# Patient Record
Sex: FEMALE | Race: BLACK OR AFRICAN AMERICAN | HISPANIC OR LATINO | Employment: PART TIME | URBAN - METROPOLITAN AREA
[De-identification: names, ages, dates, MRNs, and addresses within clinical notes are randomized per-mention and may not be internally consistent; named-entity substitution may affect disease eponyms.]

---
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
## 2021-02-19 ENCOUNTER — TELEPHONE (OUTPATIENT)
Dept: PSYCHIATRY | Facility: CLINIC | Age: 21
End: 2021-02-19

## 2021-02-19 NOTE — TELEPHONE ENCOUNTER
Behavorial Health Outpatient Intake Questions    Referred by: PCP    Please advised interviewee that they need to answer all questions truthfully to allow for best care and any misrepresentations of information may affect their ability to be seen at this clinic   => Was this discussed? Yes     [facility]     Presenting Problem (in patient's words): Anxiety, depression      Are there any developmental disabilities? ? If yes, can they speak to you on the phone? If they are too limited to speak to you on phone, refer out No    Are you taking any psychiatric medications? No    => If yes, who prescribes? If yes, are they injectable medications? Does the patient have a language barrier or hearing impairment? No    Have you been treated at [facility] by a therapist or a doctor in the past? If yes, who? No    Has the patient been hospitalized for mental health? Yes,    If yes, how long ago was last hospitalization and where was it? [facility] 2021    Do you actively use alcohol or marijuana or illegal substances? If yes, what and how much - refer out to Drug and alcohol treatment if use is excessive or daily use of illegal substances No concerns of substance abuse are reported  Do you have a community treatment team or ? No    Legal History-     Does the patient have any history of arrests, longterm/skilled nursing time, or DUIs? No  If Yes-  1) What types of charges? 2) When were they last incarcerated? 3) Are they currently on parole or probation? Minor Child-    Who has custody of the child? Is there a custody agreement? If there is a custody agreement remind parent that they must bring a copy to the first appt or they will not be seen       Intake Team, please check with provider before scheduling if flags come up such as:  - complex case  - legal history (other than DUI)  - communication barrier concerns are present  - if, in your judgment, this needs further review    ACCEPTED as a patient : Yes => Appointment Date: 01/14/2022 at 8:00 a m with Rei Maximas     Referred Elsewhere? No    Name of Insurance Co: [employer] \[facility]\""   Insurance ID# [id number]   Insurance Phone #  If ins is primary or secondary  If patient is a minor, parents information such as Name, D  O B of guarantor

## 2021-02-22 DIAGNOSIS — L20.82 FLEXURAL ECZEMA: ICD-10-CM

## 2021-02-22 RX ORDER — TRIAMCINOLONE ACETONIDE 1 MG/G
CREAM TOPICAL 2 TIMES DAILY PRN
Qty: 30 G | Refills: 0 | Status: CANCELLED | OUTPATIENT
Start: 2021-02-22 | End: 2021-03-01

## 2021-02-22 NOTE — TELEPHONE ENCOUNTER
Appt schedule 03/03/2021  Patient wants to know if the original dosage can be sent in the meantime ? ?

## 2021-02-22 NOTE — TELEPHONE ENCOUNTER
If she feels she needs a stronger dose due to increase in symptoms then she will need an appt to review with the provider   Please schedule Post-Care Instructions: I reviewed with the patient in detail post-care instructions. Patient is to keep the biopsy site dry overnight, and then apply bacitracin twice daily until healed. Patient may apply hydrogen peroxide soaks to remove any crusting. Anesthesia Type: 1% lidocaine with epinephrine Depth Of Biopsy: dermis Render In Bullet Format When Appropriate: No Size Of Lesion In Cm: 0 Hemostasis: Angelica's Billing Type: Third-Party Bill Notification Instructions: Patient will be notified of biopsy results. However, patient instructed to call the office if not contacted within 2 weeks. Curettage Text: The wound bed was treated with curettage after the biopsy was performed. Biopsy Type: H and E Silver Nitrate Text: The wound bed was treated with silver nitrate after the biopsy was performed. Anesthesia Volume In Cc: 1 Detail Level: Detailed Cryotherapy Text: The wound bed was treated with cryotherapy after the biopsy was performed. Was A Bandage Applied: Yes Consent: Written consent was obtained and risks were reviewed including but not limited to scarring, infection, bleeding, scabbing, incomplete removal, nerve damage and allergy to anesthesia. Wound Care: Vaseline Dressing: bandage Electrodesiccation And Curettage Text: The wound bed was treated with electrodesiccation and curettage after the biopsy was performed. Type Of Destruction Used: Curettage Biopsy Method: Personna blade Electrodesiccation Text: The wound bed was treated with electrodesiccation after the biopsy was performed.

## 2021-02-22 NOTE — TELEPHONE ENCOUNTER
Name of medication, dose, quantity and frequency    Requested Prescriptions     Pending Prescriptions Disp Refills    triamcinolone (KENALOG) 0 1 % cream 30 g 0     Sig: Apply topically 2 (two) times a day as needed (eczema flare) for up to 7 days     Number of refills left:  0    Amount of medication left:  0    Pharmacy verified and updated   yes    Additional information:    Patient is requesting a stronger dosage  Patient states she is breaking out everywhere    Please check into this

## 2021-02-25 DIAGNOSIS — L20.82 FLEXURAL ECZEMA: ICD-10-CM

## 2021-02-25 RX ORDER — TRIAMCINOLONE ACETONIDE 1 MG/G
CREAM TOPICAL 2 TIMES DAILY PRN
Qty: 30 G | Refills: 0 | Status: SHIPPED | OUTPATIENT
Start: 2021-02-25 | End: 2021-04-14

## 2021-03-31 ENCOUNTER — PATIENT OUTREACH (OUTPATIENT)
Dept: INTERNAL MEDICINE CLINIC | Facility: CLINIC | Age: 21
End: 2021-03-31

## 2021-03-31 NOTE — PROGRESS NOTES
No further contact from pt but she is on the waiting list for therapist at [facility]  SW to remain available to assist as indicted  Please re-consult SW as needed

## 2021-04-14 ENCOUNTER — OFFICE VISIT (OUTPATIENT)
Dept: OBGYN CLINIC | Facility: CLINIC | Age: 21
End: 2021-04-14

## 2021-04-14 VITALS
SYSTOLIC BLOOD PRESSURE: 109 MMHG | BODY MASS INDEX: 30.96 KG/M2 | DIASTOLIC BLOOD PRESSURE: 73 MMHG | HEIGHT: 61 IN | WEIGHT: 164 LBS | HEART RATE: 66 BPM

## 2021-04-14 DIAGNOSIS — R35.0 URINE FREQUENCY: Primary | ICD-10-CM

## 2021-04-14 DIAGNOSIS — B37.3 VAGINAL YEAST INFECTION: ICD-10-CM

## 2021-04-14 DIAGNOSIS — N89.8 VAGINAL DISCHARGE: ICD-10-CM

## 2021-04-14 LAB
BV WHIFF TEST VAG QL: ABNORMAL
CLUE CELLS SPEC QL WET PREP: ABNORMAL
PH SMN: 4.5 [PH]
SL AMB  POCT GLUCOSE, UA: NORMAL
SL AMB LEUKOCYTE ESTERASE,UA: NORMAL
SL AMB POCT BILIRUBIN,UA: NORMAL
SL AMB POCT BLOOD,UA: NORMAL
SL AMB POCT CLARITY,UA: NORMAL
SL AMB POCT COLOR,UA: YELLOW
SL AMB POCT KETONES,UA: NORMAL
SL AMB POCT NITRITE,UA: NORMAL
SL AMB POCT PH,UA: NORMAL
SL AMB POCT SPECIFIC GRAVITY,UA: 1.01
SL AMB POCT URINE PROTEIN: NORMAL
SL AMB POCT UROBILINOGEN: 0.2
SL AMB POCT WET MOUNT: ABNORMAL
T VAGINALIS VAG QL WET PREP: ABNORMAL
YEAST VAG QL WET PREP: ABNORMAL

## 2021-04-14 PROCEDURE — 81002 URINALYSIS NONAUTO W/O SCOPE: CPT | Performed by: NURSE PRACTITIONER

## 2021-04-14 PROCEDURE — 1036F TOBACCO NON-USER: CPT | Performed by: NURSE PRACTITIONER

## 2021-04-14 PROCEDURE — 87210 SMEAR WET MOUNT SALINE/INK: CPT | Performed by: NURSE PRACTITIONER

## 2021-04-14 PROCEDURE — 87591 N.GONORRHOEAE DNA AMP PROB: CPT | Performed by: NURSE PRACTITIONER

## 2021-04-14 PROCEDURE — 87086 URINE CULTURE/COLONY COUNT: CPT | Performed by: NURSE PRACTITIONER

## 2021-04-14 PROCEDURE — 87491 CHLMYD TRACH DNA AMP PROBE: CPT | Performed by: NURSE PRACTITIONER

## 2021-04-14 PROCEDURE — 3008F BODY MASS INDEX DOCD: CPT | Performed by: NURSE PRACTITIONER

## 2021-04-14 PROCEDURE — 99213 OFFICE O/P EST LOW 20 MIN: CPT | Performed by: NURSE PRACTITIONER

## 2021-04-14 RX ORDER — FLUCONAZOLE 150 MG/1
150 TABLET ORAL ONCE
Qty: 1 TABLET | Refills: 0 | Status: SHIPPED | OUTPATIENT
Start: 2021-04-14 | End: 2021-04-14

## 2021-04-14 NOTE — PATIENT INSTRUCTIONS
Yeast Infection   WHAT YOU NEED TO KNOW:   What is a yeast infection? A yeast infection, or vulvovaginal candidiasis, is a common vaginal infection  Vulvovaginal candidiasis is caused by a fungus, or yeast-like germ  Fungi are normally found in your vagina  Too many fungi can cause an infection  What increases my risk for a yeast infection? · Pregnancy    · Medicines, such as antibiotics, birth control pills, or steroid medicine    · Medical conditions, such as diabetes    · Contraceptive devices, such as diaphragms, sponges, and intrauterine devices    What are the signs and symptoms of a yeast infection? · Thick, white, cheese-like discharge from your vagina    · Itching, swelling, and redness in your vagina    · Burning when you urinate    How is a yeast infection diagnosed and treated? · Your healthcare provider will ask about your medical history and examine you  A sample of your vaginal discharge may show what germ is causing your infection  · Medicines help treat the fungal infection and decrease inflammation  The medicine may be a pill, cream, ointment, or vaginal tablet or suppository  With treatment, the infection is usually gone within a week  Keep your vagina healthy:   · Do not have sex until your symptoms go away  Have your partner wear a condom until you complete your course of medication  · Always wipe from front to back  after you use the toilet  This prevents spreading bacteria from your rectal area into your vagina  · Clean around your vulva with mild soap and warm water each day  Gently dry the area after washing  Do not use hot tubs  The heat and moisture from hot tubs can increase your risk for another yeast infection  · Do not wear tight-fitting clothes or undergarments  for long periods  Wear cotton underwear during the day  Cotton helps keep your genital area dry and does not hold in warmth or moisture  Do not wear underwear at night      · Change your laundry soap or fabric softener  if you think it is irritating your skin  · Do not douche  or use feminine hygiene sprays or bubble bath  Do not use pads or tampons that are scented, or colored or perfumed toilet paper  · Ask your healthcare provider about birth control options if necessary  Condoms have latex and diaphragms have gel that kills sperm  Both of these may irritate your genital area  When should I contact my healthcare provider? · You have fever and chills  · You develop abdominal or pelvic pain  · Your discharge is bloody and it is not your monthly period  · Your signs and symptoms get worse, even after treatment  · You have questions or concerns about your condition or care  CARE AGREEMENT:   You have the right to help plan your care  Learn about your health condition and how it may be treated  Discuss treatment options with your healthcare providers to decide what care you want to receive  You always have the right to refuse treatment  The above information is an  only  It is not intended as medical advice for individual conditions or treatments  Talk to your doctor, nurse or pharmacist before following any medical regimen to see if it is safe and effective for you  © Copyright [address] [place] [address] Drive Information is for End User's use only and may not be sold, redistributed or otherwise used for commercial purposes  All illustrations and images included in CareNotes® are the copyrighted property of [employer]  or [address]   Fluconazole (By mouth)   Fluconazole ([id number])  Prevents and treats fungal infections  Brand Name(s): Diflucan   There may be other brand names for this medicine  When This Medicine Should Not Be Used: This medicine is not right for everyone  Do not use it if you had an allergic reaction to fluconazole, or if you are pregnant  How to Use This Medicine:   Liquid, Tablet  · Your doctor will tell you how much medicine to use  Do not use more than directed  · Oral liquid: Shake well just before each use  Measure the oral liquid medicine with a marked measuring spoon, oral syringe, or medicine cup  · Take all of the medicine in your prescription to clear up your infection, even if you feel better after the first few doses  · Read and follow the patient instructions that come with this medicine  Talk to your doctor or pharmacist if you have any questions  · Missed dose: Take a dose as soon as you remember  If it is almost time for your next dose, wait until then and take a regular dose  Do not take extra medicine to make up for a missed dose  · Store the medicine in a closed container at room temperature, away from heat, moisture, and direct light  You may store the oral liquid in the refrigerator or at room temperature for up to 14 days  Do not freeze  Drugs and Foods to Avoid:   Ask your doctor or pharmacist before using any other medicine, including over-the-counter medicines, vitamins, and herbal products  · Do not use this medicine together with astemizole, cisapride, erythromycin, pimozide, quinidine, or terfenadine  · Some medicines can affect how fluconazole works  Tell your doctor of all medicines you are taking, including any of the following:   ? Amiodarone, amphotericin B, astemizole, cimetidine, cyclosporine, halofantrine, midazolam, prednisone, rifabutin, rifampin, sirolimus, tacrolimus, theophylline, tofacitinib, triazolam, vitamin A supplements, voriconazole  ? Birth control pills  ? Blood pressure medicine (including amlodipine, felodipine, isradipine, losartan, nifedipine)  ? Blood thinner (including warfarin)  ? Cancer medicine (including cyclophosphamide, olaparib, vinblastine, vincristine)  ? Diuretic (water pill, including hydrochlorothiazide)  ? Medicine to lower cholesterol (including atorvastatin, fluvastatin, simvastatin)  ? Medicine to treat depression (including amitriptyline, nortriptyline)  ?  Medicine to treat HIV/AIDS (including saquinavir, zidovudine)  ? Medicine to treat seizures (including carbamazepine, phenytoin)  ? Narcotic pain medicine (including alfentanil, fentanyl, methadone)  ? NSAID pain or arthritis medicine (including aspirin, celecoxib, diclofenac, ibuprofen, naproxen)  ? Oral diabetes medicine (including glipizide, glyburide, tolbutamide)  · Check with your doctor before starting any new medicines within 7 days of using this medicine  Warnings While Using This Medicine:   · It is not safe to take this medicine during pregnancy  It could harm an unborn baby  Tell your doctor right away if you become pregnant  Use an effective form of birth control during treatment with this medicine and for at least 1 week after the last dose  · Tell your doctor if you are breastfeeding, or if you have kidney disease, liver disease, heart disease, heart failure, heart rhythm problems, cancer, HIV/AIDS, or hereditary problems  · This medicine may cause the following problems:   ? Liver problems  ? Serious skin reactions  ? Changes in heart rhythm, including QT prolongation  ? Adrenal gland problems  · This medicine may make you dizzy or drowsy  Do not drive or do anything else that could be dangerous until you know how this medicine affects you  · Call your doctor if your symptoms do not improve or if they get worse  · Your doctor will do lab tests at regular visits to check on the effects of this medicine  Keep all appointments  · Keep all medicine out of the reach of children  Never share your medicine with anyone    Possible Side Effects While Using This Medicine:   Call your doctor right away if you notice any of these side effects:  · Allergic reaction: Itching or hives, swelling in your face or hands, swelling or tingling in your mouth or throat, chest tightness, trouble breathing  · Blistering, peeling, or red skin rash  · Changes in skin color, dark freckles, cold feeling, weight loss  · Dark urine or pale stools, nausea, vomiting, loss of appetite, stomach pain, yellow skin or eyes  · Fainting, dizziness, lightheadedness  · Fast, pounding, or uneven heartbeat  · Unusual bleeding, bruising, or weakness  If you notice these less serious side effects, talk with your doctor:   · Headache  · Mild nausea, vomiting, diarrhea  If you notice other side effects that you think are caused by this medicine, tell your doctor  Call your doctor for medical advice about side effects  You may report side effects to FDA at 4-235-FDA-4193  © Copyright [address] [place] [address] Drive Information is for End User's use only and may not be sold, redistributed or otherwise used for commercial purposes  The above information is an  only  It is not intended as medical advice for individual conditions or treatments  Talk to your doctor, nurse or pharmacist before following any medical regimen to see if it is safe and effective for you

## 2021-04-14 NOTE — PROGRESS NOTES
Assessment/Plan:      Diagnoses and all orders for this visit:    Urine frequency  -     POCT urine dip  -     POCT wet mount  -     Chlamydia/GC amplified DNA by PCR  -     Urine culture    Vaginal discharge  -     POCT wet mount  -     Chlamydia/GC amplified DNA by PCR    Vaginal yeast infection  -     fluconazole (DIFLUCAN) 150 mg tablet; Take 1 tablet (150 mg total) by mouth once for 1 dose      -reviewed with patient diagnosis of vaginal yeast infection and treatment fluconazole  Written information provided  -reviewed with patient urine dip is negative for infection in office today  Will send for culture and treat based on results  Patient encouraged to increase hydration  -chlamydia and gonorrhea sent  Will treat based on results  -safe sexual practices reviewed and encouraged  Reviewed patient Rubens Samina will protect from pregnancy will not protect from sexually transmitted infections  -signs and symptoms report reviewed      RTO one year for annual exam    Subjective:     Patient ID: Marianna Montes is a 22 y o  female  HPI G0 here with complaints urinary frequency for approximately 1 week  Denies urgency and pain with urination  Associated symptoms include white thick vaginal discharge intermittent for approximately 3-4 weeks  Denies alleviating or aggravating factors  Has not tried any over-the-counter remedies  Is sexually active has Nexplanon for contraception  Denies new partners, fever, chills, pain in bowel concerns    Had Gardasil vaccine series  Pap smears to begin age 25  Last gonorrhea/chlamydia 5/19/20 negative    Review of Systems   Constitutional: Negative for chills, fatigue and fever  Respiratory: Negative  Cardiovascular: Negative  Genitourinary: Positive for decreased urine volume, frequency and vaginal discharge  Negative for difficulty urinating, dysuria, flank pain, genital sores, hematuria, menstrual problem, pelvic pain, urgency, vaginal bleeding and vaginal pain  Objective:  Vitals:    04/14/21 0940   BP: 109/73   Pulse: 66          Physical Exam  Vitals signs reviewed  Exam conducted with a chaperone present  Constitutional:       Appearance: Normal appearance  Cardiovascular:      Rate and Rhythm: Normal rate and regular rhythm  Genitourinary:     General: Normal vulva  Vagina: Vaginal discharge present  Cervix: Normal       Uterus: Normal        Comments: Large amount of thick white vaginal discharge noted on exam adherent to vaginal wall  Neurological:      General: No focal deficit present  Mental Status: She is alert     Psychiatric:         Mood and Affect: Mood normal          Behavior: Behavior normal

## 2021-04-15 LAB — BACTERIA UR CULT: NORMAL

## 2021-04-16 LAB
C TRACH DNA SPEC QL NAA+PROBE: NEGATIVE
N GONORRHOEA DNA SPEC QL NAA+PROBE: NEGATIVE

## 2021-06-15 ENCOUNTER — OFFICE VISIT (OUTPATIENT)
Dept: OBGYN CLINIC | Facility: CLINIC | Age: 21
End: 2021-06-15

## 2021-06-15 VITALS
SYSTOLIC BLOOD PRESSURE: 122 MMHG | WEIGHT: 166 LBS | BODY MASS INDEX: 31.34 KG/M2 | HEIGHT: 61 IN | DIASTOLIC BLOOD PRESSURE: 74 MMHG | HEART RATE: 84 BPM

## 2021-06-15 DIAGNOSIS — B37.3 YEAST INFECTION OF THE VAGINA: Primary | ICD-10-CM

## 2021-06-15 PROCEDURE — 1036F TOBACCO NON-USER: CPT | Performed by: OBSTETRICS & GYNECOLOGY

## 2021-06-15 PROCEDURE — 3008F BODY MASS INDEX DOCD: CPT | Performed by: OBSTETRICS & GYNECOLOGY

## 2021-06-15 PROCEDURE — 99213 OFFICE O/P EST LOW 20 MIN: CPT | Performed by: OBSTETRICS & GYNECOLOGY

## 2021-06-15 RX ORDER — FLUCONAZOLE 150 MG/1
150 TABLET ORAL ONCE
Qty: 1 TABLET | Refills: 0 | Status: SHIPPED | OUTPATIENT
Start: 2021-06-15 | End: 2021-06-15

## 2021-06-16 NOTE — PROGRESS NOTES
2906  VISIT  Name: Joanie Marie  MRN: [id number]  : 2000      ASSESSMENT/PLAN:  Problem List Items Addressed This Visit     None      Visit Diagnoses     Yeast infection of the vagina    -  Primary    Relevant Medications    fluconazole (DIFLUCAN) 150 mg tablet            SUBJECTIVE:  20yo G0 presenting w/ vaginal discharge and itchiness  Has had similar symptoms in the past when she had a yeast infection  Denies urinary symptoms  Sexually active, Nexplanon for contraception -> irregular periods  Denies new partners        OBJECTIVE:  Vitals:    06/15/21 1555   BP: 122/74   Pulse: 84       Physical Exam    Thick white discharge on exam  +hyphae on KOH/wet mount      Cedrick Cifuentes MD  OB/GYN PGY-3  6/15/2021

## 2021-07-24 ENCOUNTER — HOSPITAL ENCOUNTER (EMERGENCY)
Facility: HOSPITAL | Age: 21
Discharge: HOME | End: 2021-07-24
Attending: EMERGENCY MEDICINE
Payer: COMMERCIAL

## 2021-07-24 VITALS
SYSTOLIC BLOOD PRESSURE: 127 MMHG | OXYGEN SATURATION: 98 % | HEART RATE: 62 BPM | RESPIRATION RATE: 18 BRPM | BODY MASS INDEX: 31.18 KG/M2 | TEMPERATURE: 98.1 F | DIASTOLIC BLOOD PRESSURE: 87 MMHG | WEIGHT: 165 LBS

## 2021-07-24 DIAGNOSIS — M54.50 ACUTE LOW BACK PAIN: Primary | ICD-10-CM

## 2021-07-24 DIAGNOSIS — R35.89 POLYURIA: ICD-10-CM

## 2021-07-24 DIAGNOSIS — R20.2 PARESTHESIAS: ICD-10-CM

## 2021-07-24 LAB
BILIRUB UR QL STRIP: NEGATIVE
BILIRUB UR QL STRIP: NEGATIVE
CLARITY UR: CLEAR
CLARITY UR: CLEAR
COLOR UR: YELLOW
COLOR UR: YELLOW
EXT PREG TEST URINE: NORMAL
EXT. CONTROL ED NAV: NORMAL
GLUCOSE UR STRIP-MCNC: NEGATIVE MG/DL
GLUCOSE UR STRIP-MCNC: NEGATIVE MG/DL
HGB UR QL STRIP.AUTO: NEGATIVE
HGB UR QL STRIP.AUTO: NEGATIVE
KETONES UR STRIP-MCNC: NEGATIVE MG/DL
KETONES UR STRIP-MCNC: NEGATIVE MG/DL
LEUKOCYTE ESTERASE UR QL STRIP: NEGATIVE
LEUKOCYTE ESTERASE UR QL STRIP: NEGATIVE
NITRITE UR QL STRIP: NEGATIVE
NITRITE UR QL STRIP: NEGATIVE
PH UR STRIP.AUTO: 7 [PH]
PH UR STRIP.AUTO: 7 [PH] (ref 4.5–8)
PROT UR STRIP-MCNC: NEGATIVE MG/DL
PROT UR STRIP-MCNC: NEGATIVE MG/DL
SP GR UR STRIP.AUTO: 1.02 (ref 1–1.03)
SP GR UR STRIP.AUTO: 1.02 (ref 1–1.03)
UROBILINOGEN UR QL STRIP.AUTO: 0.2 E.U./DL
UROBILINOGEN UR QL STRIP.AUTO: 1 E.U./DL

## 2021-07-24 PROCEDURE — 99284 EMERGENCY DEPT VISIT MOD MDM: CPT | Performed by: EMERGENCY MEDICINE

## 2021-07-24 PROCEDURE — 99283 EMERGENCY DEPT VISIT LOW MDM: CPT

## 2021-07-24 PROCEDURE — 81003 URINALYSIS AUTO W/O SCOPE: CPT

## 2021-07-24 PROCEDURE — 81025 URINE PREGNANCY TEST: CPT | Performed by: EMERGENCY MEDICINE

## 2021-07-24 PROCEDURE — 81003 URINALYSIS AUTO W/O SCOPE: CPT | Performed by: EMERGENCY MEDICINE

## 2021-07-24 RX ORDER — IBUPROFEN 400 MG/1
400 TABLET ORAL ONCE
Status: COMPLETED | OUTPATIENT
Start: 2021-07-24 | End: 2021-07-24

## 2021-07-24 RX ORDER — ACETAMINOPHEN 325 MG/1
650 TABLET ORAL ONCE
Status: COMPLETED | OUTPATIENT
Start: 2021-07-24 | End: 2021-07-24

## 2021-07-24 RX ADMIN — IBUPROFEN 400 MG: 400 TABLET ORAL at 12:49

## 2021-07-24 RX ADMIN — ACETAMINOPHEN 650 MG: 325 TABLET, FILM COATED ORAL at 12:50

## 2021-07-24 NOTE — ED ATTENDING ATTESTATION
7/24/2021  I, Saintclair Bride, MD, saw and evaluated the patient  I have discussed the patient with the resident/non-physician practitioner and agree with the resident's/non-physician practitioner's findings, Plan of Care, and MDM as documented in the resident's/non-physician practitioner's note, except where noted  All available labs and Radiology studies were reviewed  I was present for key portions of any procedure(s) performed by the resident/non-physician practitioner and I was immediately available to provide assistance  At this point I agree with the current assessment done in the Emergency Department  I have conducted an independent evaluation of this patient a history and physical is as follows: This is a 22 y o  old female who presents to the ED for evaluation of back pain  Bilateral lower back pain, radiates down the left to the thigh, started after working multiple shifts at work  No urinary symptoms  No trauma, no red flag symptoms  VS and nursing notes reviewed  General: Appears in NAD, awake, alert, speaking normally in full sentences  Well-nourished, well-developed  Appears stated age  Head: Normocephalic, atraumatic  Eyes: EOMI  Vision grossly normal  No subconjunctival hemorrhages or occular discharge noted  Symmetrical lids  ENT: Atraumatic external nose and ears  No stridor  Normal phonation  No drooling  Normal swallowing  Neck: No JVD  FROM  No goiter  CV: No pallor  Normal rate  Lungs: No tachypnea  No respiratory distress  MSK: Moving all extremities equally, no peripheral edema  Skin: Dry, intact  No cyanosis  Neuro: Awake, alert, GCS15  CN II-XII grossly intact  Grossly normal gait  Psychiatric/Behavioral: Appropriate mood and affect  A/P: This is a 22 y o  female who presents to the ED for evaluation of back pain  NSAIDs, can follow up with comp spine        ED Course         Critical Care Time  Procedures

## 2021-07-24 NOTE — ED PROVIDER NOTES
History  Chief Complaint   Patient presents with    Back Pain     pt presents ambulatory with c/o L side lower back/flank pain with shooting pain down L leg  possible injury  denies hematuria/dysuria      Patient is a 73-year-old female, with history significant for 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and severe menstrual cramps, who presents the ED today due to atraumatic acute lower back pain since last weekend  The onset of her symptoms was after working three 12 hour shifts at [employer] where she was on her feet all day  In the following days, patient developed radiation of this discomfort to midway down the posterior aspect of her left thigh  There was one episode of paresthesias in this distribution  The pain is mild in intensity and pressure-like in character  It is felt in the lower back bilaterally but it is more prominent on the left side  Position change exacerbates her discomfort  Patient has not taken any analgesia; however, she has attempted stretching and this partially remitted her symptoms  The pain has been constant since onset  Patient denies associated fever, trauma, IVDU, steroids, incontinence, urinary retention, saddle anesthesia  Patient denies dysuria but does report some polyuria/increased frequency  - No language barrier    - History obtained from patient  - There are no limitations to the history obtained  - Previous charting was reviewed          Prior to Admission Medications   Prescriptions Last Dose Informant Patient Reported? Taking?    LORazepam (ATIVAN) 0 5 mg tablet   No No   Sig: Take 1 tablet (0 5 mg total) by mouth every 8 (eight) hours as needed for anxiety   diazepam (VALIUM) 5 mg tablet   No No   Sig: Take 1 tablet (5 mg total) by mouth 3 (three) times a day for 2 days   Patient not taking: Reported on 8/14/2020   etonogestrel (NEXPLANON) subdermal implant   Yes No   Sig: Inject 68 mg under the skin   loratadine (CLARITIN) 10 mg tablet   No No   Sig: Take 1 tablet (10 mg Hematological: Negative for adenopathy  Psychiatric/Behavioral: Negative for confusion  Physical Exam  ED Triage Vitals [07/24/21 1134]   Temperature Pulse Respirations Blood Pressure SpO2   98 1 °F (36 7 °C) 62 18 127/87 98 %      Temp Source Heart Rate Source Patient Position - Orthostatic VS BP Location FiO2 (%)   Tympanic Monitor -- -- --      Pain Score       5             Orthostatic Vital Signs  Vitals:    07/24/21 1134   BP: 127/87   Pulse: 62       Physical Exam  Vitals and nursing note reviewed  Constitutional:       General: She is not in acute distress  Appearance: She is obese  She is not ill-appearing or toxic-appearing  HENT:      Head: Normocephalic  Right Ear: External ear normal       Left Ear: External ear normal       Nose: Nose normal  No rhinorrhea  Mouth/Throat:      Mouth: Mucous membranes are moist       Pharynx: Oropharynx is clear  No oropharyngeal exudate or posterior oropharyngeal erythema  Eyes:      General: No scleral icterus  Right eye: No discharge  Left eye: No discharge  Conjunctiva/sclera: Conjunctivae normal       Pupils: Pupils are equal, round, and reactive to light  Cardiovascular:      Rate and Rhythm: Normal rate and regular rhythm  Pulses: Normal pulses  Heart sounds: Normal heart sounds  No murmur heard  No friction rub  No gallop  Comments: 2+ Radial  Pulmonary:      Effort: Pulmonary effort is normal  No respiratory distress  Breath sounds: Normal breath sounds  No stridor  No wheezing, rhonchi or rales  Abdominal:      General: Abdomen is flat  Bowel sounds are normal  There is no distension  Palpations: Abdomen is soft  Tenderness: There is no abdominal tenderness  There is no right CVA tenderness, left CVA tenderness, guarding or rebound  Musculoskeletal:         General: Tenderness present  Cervical back: Neck supple  No tenderness  No muscular tenderness        Right lower leg: No edema  Left lower leg: No edema  Comments: No midline C, T, L-spine tenderness to palpation  Sacral paraspinal tenderness more pronounced on the left but present bilaterally  No SI joint tenderness bilaterally  Negative straight leg raise bilateral     Lymphadenopathy:      Cervical: No cervical adenopathy  Skin:     General: Skin is warm and dry  Capillary Refill: Capillary refill takes less than 2 seconds  Neurological:      Mental Status: She is alert  Comments: Patient is speaking clearly in complete sentences  Patient is answering appropriately and able follow commands  Patient is moving all four extremities spontaneously  No facial droop  Tongue midline  Intact L5 and S1 motor and sensory function  No saddle anesthesia  Patient is able to ambulate without difficulty  5/5 lower extremity strength bilaterally      Psychiatric:         Mood and Affect: Mood normal          Behavior: Behavior normal          ED Medications  Medications   ibuprofen (MOTRIN) tablet 400 mg (400 mg Oral Given 7/24/21 1249)   acetaminophen (TYLENOL) tablet 650 mg (650 mg Oral Given 7/24/21 1250)       Diagnostic Studies  Results Reviewed     Procedure Component Value Units Date/Time    UA w Reflex to Microscopic w Reflex to Culture [[id number]] Collected: 07/24/21 1253    Lab Status: Final result Specimen: Urine, Clean Catch Updated: 07/24/21 1309     Color, UA Yellow     Clarity, UA Clear     Specific Gravity, UA 1 023     pH, UA 7 0     Leukocytes, UA Negative     Nitrite, UA Negative     Protein, UA Negative mg/dl      Glucose, UA Negative mg/dl      Ketones, UA Negative mg/dl      Urobilinogen, UA 1 0 E U /dl      Bilirubin, UA Negative     Blood, UA Negative    POCT pregnancy, urine [[id number]]  (Normal) Resulted: 07/24/21 1250    Lab Status: Final result Updated: 07/24/21 1250     EXT PREG TEST UR (Ref: Negative) neg     Control valid    Urine Macroscopic, POC [[id number]] Collected: 07/24/21 1243    Lab Status: Final result Specimen: Urine Updated: 07/24/21 1245     Color, UA Yellow     Clarity, UA Clear     pH, UA 7 0     Leukocytes, UA Negative     Nitrite, UA Negative     Protein, UA Negative mg/dl      Glucose, UA Negative mg/dl      Ketones, UA Negative mg/dl      Urobilinogen, UA 0 2 E U /dl      Bilirubin, UA Negative     Blood, UA Negative     Specific Gravity, UA 1 025    Narrative:      CLINITEK RESULT                 No orders to display         Procedures  Procedures      ED Course  ED Course as of Jul 24 1324   Sat Jul 24, 2021   1324 Patient reports improvement in her symptoms  She has neither questions nor concerns after receiving discharge instructions and return precautions  MDM  Number of Diagnoses or Management Options  Acute low back pain  Paresthesias  Polyuria  Diagnosis management comments: Patient is a 20-year-old female, with history significant for 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and severe menstrual cramps, who presents the ED today due to atraumatic acute lower back pain since last weekend  The onset of her symptoms was after working three 12 hour shifts at [employer] where she was on her feet all day  In the following days, patient developed radiation of this discomfort to midway down the posterior aspect of her left thigh  Position change exacerbates her discomfort and stretching relieves  Patient denies associated fever, trauma, IVDU, steroids, incontinence, urinary retention, saddle anesthesia  Patient denies dysuria but does report some polyuria/increased frequency  Patient is currently afebrile and hemodynamically stable  Her physical exam is notable for bilateral sacral paraspinal tenderness  Negative straight leg raise bilaterally  No concerning neuro signs in the lower extremities  This presentation is concerning for:  Musculoskeletal back pain  I also considered UTI/pregnancy   Low clinical suspicion for cauda equina, conus medullaris, epidural abscess at this time based upon history and physical exam   Will investigate with UA/urine pregnancy  Will manage with Motrin/Tylenol, referral to comprehensive spine, and further based on workup      Disposition  Final diagnoses:   Acute low back pain   Polyuria   Paresthesias     Time reflects when diagnosis was documented in both MDM as applicable and the Disposition within this note     Time User Action Codes Description Comment    7/24/2021 12:29 PM Birdoksana Ovalleaw A Add [M54 5] Acute low back pain     7/24/2021 12:29 PM Birdena Outlaw A Add [R35 8] Polyuria     7/24/2021 12:30 PM Birdena Outlaw Add [R20 2] Paresthesias       ED Disposition     ED Disposition Condition Date/Time Comment    Discharge Stable Sat Jul 24, 2021  1:21 PM Vision Kayla Kurtz discharge to home/self care              Follow-up Information     Follow up With Specialties Details Why Contact Info Additional [address] [facility] Schedule an appointment as soon as possible for a visit   [address], [address] [facility] [id number]  [address] [place] [address], [address], [address] [facility], [facility], [address] [place] [address], [address] [place] [address] Avenue          Discharge Medication List as of 7/24/2021  1:21 PM      CONTINUE these medications which have NOT CHANGED    Details   diazepam (VALIUM) 5 mg tablet Take 1 tablet (5 mg total) by mouth 3 (three) times a day for 2 days, Starting Thu 6/25/2020, Until Sat 6/27/2020, Print      etonogestrel (NEXPLANON) subdermal implant Inject 68 mg under the skin, Historical Med      loratadine (CLARITIN) 10 mg tablet Take 1 tablet (10 mg total) by mouth daily, Starting Wed 11/4/2020, Normal      LORazepam (ATIVAN) 0 5 mg tablet Take 1 tablet (0 5 mg total) by mouth every 8 (eight) hours as needed for anxiety, Starting Mon 8/3/2020, Normal               PDMP Review     None ED Provider  Attending physically available and evaluated Savannah Ambika BENTON managed the patient along with the ED Attending      Electronically Signed by         Marielle Hayes MD  07/24/21 8014

## 2021-07-24 NOTE — DISCHARGE INSTRUCTIONS
You were evaluated in the emergency department for: low back pain and increased urination  You can access your current and pending results through [facility]  You should follow-up with your primary care provider, as soon as possible, for re-evaluation  If you do not have a primary care provider, I have referred you to [facility]  You will be contacted about scheduling an appointment  Their phone number is also included on this paperwork  You have also been referred to the comprehensive spine program      You may take 650mg of tylenol every four to six hours, not exceeding 3,000mg daily, for the management of your discomfort  You may also take ibuprofen, 400mg every six to eight hours  Your workup revealed no emergent features at this time; however, many disease processes are dynamic:    Please, return to the emergency department if you experience new or worsening symptoms, fever, chest pain, shortness of breath, difficulty breathing, dizziness, abdominal pain, persistent nausea/vomiting, syncope or passing out, blood in your urine or stool, coughing up blood, leg swelling/pain, urinary retention, bowel or bladder incontinence, numbness between your legs

## 2021-07-29 ENCOUNTER — TELEPHONE (OUTPATIENT)
Dept: PHYSICAL THERAPY | Facility: OTHER | Age: 21
End: 2021-07-29

## 2021-07-29 ENCOUNTER — NURSE TRIAGE (OUTPATIENT)
Dept: PHYSICAL THERAPY | Facility: OTHER | Age: 21
End: 2021-07-29

## 2021-07-29 DIAGNOSIS — M54.50 ACUTE LEFT-SIDED LOW BACK PAIN, UNSPECIFIED WHETHER SCIATICA PRESENT: Primary | ICD-10-CM

## 2021-07-29 NOTE — TELEPHONE ENCOUNTER
Call placed to the patient per Comprehensive Spine Program referral     Voice message left for patient to call back  Phone number and hours of business provided  Referral closed per protocol

## 2021-07-29 NOTE — TELEPHONE ENCOUNTER
Additional Information   Negative: Is this related to a work injury?  Negative: Is this related to an MVA?  Negative: Are you currently recieving homecare services?  Negative: Has the patient had unexplained weight loss?  Negative: Does the patient have a fever?  Negative: Is the patient experiencing blood in sputum?  Negative: Has the patient experienced major trauma? (fall from height, high speed collision, direct blow to spine) and is also experiencing nausea, light-headedness, or loss of consciousness?  Negative: Is the patient experiencing urine retention?  Negative: Is the patient experiencing acute drop foot or paralysis?  Negative: Is this a chronic condition? Background - Initial Assessment  Clinical complaint: left sided low back pain (LLE resolved)  Date of onset: Back pain started about 1 week ago- [facility]I-Warranted ED visit  Frequency of pain: intermittent  Quality of pain: dull ache, sharp    Protocols used: [facility] COMPREHENSIVE SPINE PROGRAM PROTOCOL    Patient seen in ED 7/24/21- PLEASE SEE NOTES   Patient also LM for [facility]SP today as well  Nurse returned call and reviewed program   Triaged for above complaints and NO RF s/s present  Referral entered for [facility] site and contact info given to her as well  Nurse offered to have [address] call d/t scoring  Patient declined  Patient was pleasant and appropriate during call  Patient appreciative of CB  Nurse wished her well and referral already closed

## 2021-08-03 ENCOUNTER — EVALUATION (OUTPATIENT)
Dept: PHYSICAL THERAPY | Facility: REHABILITATION | Age: 21
End: 2021-08-03
Payer: COMMERCIAL

## 2021-08-03 DIAGNOSIS — M54.16 LUMBAR RADICULOPATHY: Primary | ICD-10-CM

## 2021-08-03 DIAGNOSIS — M54.50 ACUTE LEFT-SIDED LOW BACK PAIN, UNSPECIFIED WHETHER SCIATICA PRESENT: ICD-10-CM

## 2021-08-03 PROCEDURE — 97162 PT EVAL MOD COMPLEX 30 MIN: CPT | Performed by: PHYSICAL THERAPIST

## 2021-08-03 NOTE — PROGRESS NOTES
PT Evaluation     Today's date: 8/3/2021  Patient name: Christopher Taveras  : 2000  MRN: [id number]  Referring provider: Tyrell Toure PT  Dx:   Encounter Diagnosis     ICD-10-CM    1  Lumbar radiculopathy  M54 16    2  Acute left-sided low back pain, unspecified whether sciatica present  M54 5 Ambulatory referral to PT spine       Start Time: 1745  Stop Time: 1830  Total time in clinic (min): 45 minutes    Assessment  Assessment details: Christopher Taveras is a 22 y o  female presenting with L-sided low back pain with LLE radicular symptoms to distal posterior knee  Primary impairments include BLE weakness, impaired lumbar AROM, and motor control dysfunction  Will benefit from skilled PT interventions for community reintegration, improved ability to perform work duties, and return to hobbies/leisure activities  Impairments: abnormal coordination, abnormal gait, abnormal muscle firing, abnormal muscle tone, abnormal or restricted ROM, activity intolerance, impaired balance, impaired physical strength, lacks appropriate home exercise program, pain with function, poor posture  and poor body mechanics  Functional limitations: bending, lifting, prolonged standing/walking/sitting, supine positioning  Symptom irritability: moderateBarriers to therapy: Work schedule  Understanding of Dx/Px/POC: good   Prognosis: good    Goals    Short Term Goals: In 2 weeks, the patient will:  1  Improve lumbar global AROM to 100% pain-free  2  Improve BLE global hip strength to 5/5 MMT  3  Supervision with HEP for self care    Long Term Goals: In 4 weeks, the patient will:  1  Tolerate full work shift pain-free  2  FOTO to greater than predicted value  3   Independent with HEP for selfcare      Plan  Patient would benefit from: skilled physical therapy  Planned modality interventions: manual electrical stimulation  Planned therapy interventions: abdominal trunk stabilization, activity modification, balance, balance/weight bearing training, behavior modification, body mechanics training, community reintegration, coordination, fine motor coordination training, flexibility, functional ROM exercises, gait training, graded activity, graded exercise, graded motor, home exercise program, work reintegration, therapeutic training, therapeutic exercise, therapeutic activities, stretching, strengthening, self care, postural training, patient education, neuromuscular re-education, motor coordination training, massage, manual therapy, joint mobilization and ADL training  Frequency: 2x week  Duration in weeks: 4  Plan of Care beginning date: 8/3/2021  Plan of Care expiration date: 8/31/2021  Treatment plan discussed with: patient        Subjective    Pain Location: L-sided LBP with LLE radicular to distal posterior knee, recently to only distal glutes  Pain Intensity: worst 6/10, current = "uncomfortable"  JP: 3 12-hour shifts in a row  DOI: 2 weeks ago   Provoked by: prolonged sitting/standing/walking, bending, lifting  Eased Positions: stretching, lean to R side  Living Situation:difficult ADLs following long work shift  Constitutional S/S: denies n/t  Goals: "to feel better"  PLOF: 4 hours standing until pain onset, has not been to the gym in several months      Objective    Flowsheet Rows      Most Recent Value   PT/OT G-Codes   Current Score  59   Projected Score  77       OBJECTIVE:     Postural Findings:     Head Position  Protracted x  Neutral   Retracted   Scapular Position  Protracted  x Neutral   Retracted   Thoracic Spine   Inc Kyphosis x Neutral       Lumbar Spine   Inc Lordosis  x Neutral  Dec Lordosis   Pelvis   Anterior Tilt x Neutral   Posterior Tilt   Iliac Crest   L elevated x Neutral   R elevated   Feet   Pronated x Neutral   Supinated   Lateral Shift   Right   Left x None      Strength and ROM evaluated B from a regional biomechanical perspective and values relevant to this episode recorded in tables below       ROM:   Joint / Motion  Right  Left    Lumbar Flexion  100%     Lumbar Extension  90%     Lumbar Sidebending  90% 90%   Lumbar rotation 90% 90%*         Strength: MMT revealed the following findings  Joint Motion Right Left   Hip Flexion 4+/5 4+/5   Hip Abduction 4+/5 4+/5   Hip Adduction 5/5 5/5   Hip Extension 4+/5 4+/5   Knee Extension 5/5 5/5   Knee Flexion 5/5 5/5   Ankle Plantarflexion 5/5 5/5   Ankle Dorsiflexion 5/5 5/5         Repeated Movements:     Standing Baseline: mild L-sided back/glute pain                                                  DURING MVMT  RESPONSE AFTER  Standing Flexion No better No better   Standing Extension No worse No worse      Lying Baseline: mild L-sided back/glute pain                                                  DURING MVMT                       RESPONSE AFTER  Lying Flexion nt nt   Lying Extension No pain No pain         LE Myotomes:  Nerve root Test Action RIGHT  LEFT   L1-L2 Hip Flexion intact intact   L3 Knee Extension intact intact   L4  Ankle DF intact intact   L5 Great toe Extension intact intact   S1 Ankle PF, ankle eversion, hip extension, knee flexion intact intact      S2 Knee Flexion intact intact      Additional Assessments:  Pain with palpation noted: no TTP  Passive intervertebral motion: WFL        Special Tests:  Lumbar Specific and Neural Tension                                                                            Test / Measure  Left 7/19/2021   Slump test P         Treatment Based Classification: Primary extension responder; Secondary stability    Start Back Assessment   Score = 6   Psych = 3   Risk = 2       Precautions: none    Pertinent Findings:                                    Test / Measure  8/3/2021   FOTO 59   Lumbar extension AROM 90%   BLE hip flexion/abduction/extension MMT 4+/5   Start Back Assessment Score 6 / Psych 3 / Risk 2       Manuals 8/3                                                                Neuro Re-Ed Bridges 2x10            Trunk rotation gtb x10 ea            Bird dogs 2x10 ea            Pallof press             GINI 3'            PPU x10            EIS             Ther Ex             HEP review 5'            Goblet box squats             Deadlift             Nustep/RB             Reverse hypers                                                    Ther Activity             Suitcase carry                          Gait Training                                       Modalities

## 2021-08-04 ENCOUNTER — OFFICE VISIT (OUTPATIENT)
Dept: PHYSICAL THERAPY | Facility: REHABILITATION | Age: 21
End: 2021-08-04
Payer: COMMERCIAL

## 2021-08-04 DIAGNOSIS — M54.16 LUMBAR RADICULOPATHY: Primary | ICD-10-CM

## 2021-08-04 DIAGNOSIS — M54.50 ACUTE LEFT-SIDED LOW BACK PAIN, UNSPECIFIED WHETHER SCIATICA PRESENT: ICD-10-CM

## 2021-08-04 PROCEDURE — 97110 THERAPEUTIC EXERCISES: CPT | Performed by: PHYSICAL THERAPIST

## 2021-08-04 PROCEDURE — 97530 THERAPEUTIC ACTIVITIES: CPT | Performed by: PHYSICAL THERAPIST

## 2021-08-04 PROCEDURE — 97112 NEUROMUSCULAR REEDUCATION: CPT | Performed by: PHYSICAL THERAPIST

## 2021-08-04 NOTE — PROGRESS NOTES
Daily Note     Today's date: 2021  Patient name: Neetu Sidhu  : 2000  MRN: [id number]  Referring provider: Lana De León, PT  Dx:   Encounter Diagnosis     ICD-10-CM    1  Lumbar radiculopathy  M54 16    2  Acute left-sided low back pain, unspecified whether sciatica present  M54 5        Start Time: 0900  Stop Time: 0945  Total time in clinic (min): 45 minutes    Subjective: Pt reports back is feeling "pretty good today"  No back pain at start of treatment session  Objective: See treatment diary below      Assessment: Tolerated treatment well with introduction of new exercises and progression of current therapeutic interventions  Pt with slight discomfort following bridges, but prone on elbows positioning alleviated pain to continue strengthening plan of care  Patient would benefit from continued PT in order to strengthen lumbar paraspinals and improve endurance  1:1 with PABLITO Welch under direct supervision of Dolly Dickinson DPT for entirety of tx  Plan: Progress treatment as tolerated         Precautions: none    Pertinent Findings:                                    Test / Measure  8/3/2021   FOTO 59   Lumbar extension AROM 90%   BLE hip flexion/abduction/extension MMT 4+/5   Start Back Assessment Score 6 / Psych 3 / Risk 2       Manuals 8/3 8/4                                                               Neuro Re-Ed             Bridges 2x10 5# 3x15           Trunk rotation gtb x10 ea btb x15 ea           Bird dogs 2x10 ea 2x15 ea           Pallof press  13# x30 ea           GINI 3' 2'           PPU x10            EIS             Ther Ex             HEP review 5'            Goblet box squats  10# 3x15           Deadlift  15# x10  25# x10  35# x10           Upright bike  10' L2           Reverse hypers  3x10                                                  Ther Activity             Suitcase carry  20# 3x1'                        Gait Training Modalities

## 2021-08-09 ENCOUNTER — OFFICE VISIT (OUTPATIENT)
Dept: PHYSICAL THERAPY | Facility: REHABILITATION | Age: 21
End: 2021-08-09
Payer: COMMERCIAL

## 2021-08-09 DIAGNOSIS — M54.16 LUMBAR RADICULOPATHY: Primary | ICD-10-CM

## 2021-08-09 DIAGNOSIS — M54.50 ACUTE LEFT-SIDED LOW BACK PAIN, UNSPECIFIED WHETHER SCIATICA PRESENT: ICD-10-CM

## 2021-08-09 PROCEDURE — 97112 NEUROMUSCULAR REEDUCATION: CPT | Performed by: PHYSICAL THERAPIST

## 2021-08-09 PROCEDURE — 97530 THERAPEUTIC ACTIVITIES: CPT | Performed by: PHYSICAL THERAPIST

## 2021-08-09 PROCEDURE — 97110 THERAPEUTIC EXERCISES: CPT | Performed by: PHYSICAL THERAPIST

## 2021-08-09 NOTE — PROGRESS NOTES
Daily Note     Today's date: 2021  Patient name: Talya Mcclain  : 2000  MRN: [id number]  Referring provider: Janette Watts PT  Dx:   Encounter Diagnosis     ICD-10-CM    1  Lumbar radiculopathy  M54 16    2  Acute left-sided low back pain, unspecified whether sciatica present  M54 5      Start Time: 1540  Stop Time: 1620  Total time in clinic (min): 40 minutes  Subjective: Pt reports that she was sore after treatment last visit but her back has been doing better overall  Patient arrive 20 minutes late and was accommodated  Objective: See treatment diary below    Assessment: Tolerated treatment well   Patient reported general fatigue with treatment today  No reporting of back irritation during session  Plan: Progress treatment as tolerated  Precautions: none    Pertinent Findings:                                    Test / Measure  8/3/2021   FOTO 59   Lumbar extension AROM 90%   BLE hip flexion/abduction/extension MMT 4+/5   Start Back Assessment Score 6 / Psych 3 / Risk 2       Manuals 8/3 8/4 8/9                                         Neuro Re-Ed           Bridges 2x10 5# 3x15 resume        Trunk rotation gtb x10 ea btb x15 ea resume        Bird dogs 2x10 ea 2x15 ea 2*15 ea  Pallof press  13# x30 ea 15# x30 ea          GINI 3' 2' np        PPU x10          EIS           Ther Ex           HEP review 5'          Goblet box squats  10# 3x15 3*10, 15#        Deadlift  15# x10  25# x10  35# x10 15#10  25#x10 35#x10        Upright bike  10' L2 8' L2        Reverse hypers  3x10 3x10                                         Ther Activity           Suitcase carry  20# 3x1' 20# 3x1'                    Gait Training                                 Modalities

## 2021-08-10 ENCOUNTER — OFFICE VISIT (OUTPATIENT)
Dept: PHYSICAL THERAPY | Facility: REHABILITATION | Age: 21
End: 2021-08-10
Payer: COMMERCIAL

## 2021-08-10 DIAGNOSIS — M54.16 LUMBAR RADICULOPATHY: Primary | ICD-10-CM

## 2021-08-10 DIAGNOSIS — M54.50 ACUTE LEFT-SIDED LOW BACK PAIN, UNSPECIFIED WHETHER SCIATICA PRESENT: ICD-10-CM

## 2021-08-10 PROCEDURE — 97112 NEUROMUSCULAR REEDUCATION: CPT | Performed by: PHYSICAL THERAPIST

## 2021-08-10 PROCEDURE — 97110 THERAPEUTIC EXERCISES: CPT | Performed by: PHYSICAL THERAPIST

## 2021-08-10 NOTE — PROGRESS NOTES
Daily Note     Today's date: 8/10/2021  Patient name: Sofi Feldman  : 2000  MRN: [id number]  Referring provider: Kaycee Morales, PT  Dx:   Encounter Diagnosis     ICD-10-CM    1  Lumbar radiculopathy  M54 16    2  Acute left-sided low back pain, unspecified whether sciatica present  M54 5      Start Time: 1445  Stop Time: 1535  Total time in clinic (min): 50 minutes  Subjective: Pt reports that she felt good following treatment yesterday  Reports feeling much improved with feeling better when working long shifts  Objective: See treatment diary below    Assessment: Tolerated treatment well   No longer reports pain with bending forward  Patient reported fatigue with exercise progression in weights today  Plan: Progress treatment as tolerated  Precautions: none    Pertinent Findings:                                    Test / Measure  8/3/2021   FOTO 59   Lumbar extension AROM 90%   BLE hip flexion/abduction/extension MMT 4+/5   Start Back Assessment Score 6 / Psych 3 / Risk 2       Manuals 8/3 8/4 8/9 8/10                                        Neuro Re-Ed           Bridges 2x10 5# 3x15 resume 3*15, 5#       Trunk rotation gtb x10 ea btb x15 ea resume btb 2x15 ea  Bird dogs 2x10 ea 2x15 ea [id number] ea   2*15 ea  Pallof press  13# x30 ea 15# x30 ea  btb 2x15 ea         GINI 3' 2' np        PPU x10          EIS           Ther Ex           HEP review 5'          Goblet box squats  10# 3x15 3*10, 15# 3*12 15#       Deadlift  15# x10  25# x10  35# x10 15#10  25#x10 35#x10 2x10, 35#  1x10, 44#       Upright bike  10' L2 8' L2 10' L2       Reverse hypers  3x10 3x10 3*10                                        Ther Activity           Suitcase carry  20# 3x1' 20# 3x1'  resume                  Gait Training                                 Modalities

## 2021-10-26 ENCOUNTER — OFFICE VISIT (OUTPATIENT)
Dept: OBGYN CLINIC | Facility: CLINIC | Age: 21
End: 2021-10-26

## 2021-10-26 VITALS
SYSTOLIC BLOOD PRESSURE: 118 MMHG | WEIGHT: 180 LBS | HEART RATE: 73 BPM | DIASTOLIC BLOOD PRESSURE: 70 MMHG | BODY MASS INDEX: 33.99 KG/M2 | HEIGHT: 61 IN

## 2021-10-26 DIAGNOSIS — B96.89 BACTERIAL VAGINOSIS: Primary | ICD-10-CM

## 2021-10-26 DIAGNOSIS — N76.0 BACTERIAL VAGINOSIS: Primary | ICD-10-CM

## 2021-10-26 DIAGNOSIS — N89.8 VAGINAL DISCHARGE: ICD-10-CM

## 2021-10-26 DIAGNOSIS — B37.3 VAGINAL YEAST INFECTION: ICD-10-CM

## 2021-10-26 PROBLEM — B37.31 VAGINAL YEAST INFECTION: Status: ACTIVE | Noted: 2021-10-26

## 2021-10-26 LAB
BV WHIFF TEST VAG QL: NEGATIVE
CLUE CELLS SPEC QL WET PREP: POSITIVE
PH SMN: 5 [PH]
SL AMB  POCT GLUCOSE, UA: NORMAL
SL AMB LEUKOCYTE ESTERASE,UA: NORMAL
SL AMB POCT BILIRUBIN,UA: NORMAL
SL AMB POCT BLOOD,UA: NORMAL
SL AMB POCT CLARITY,UA: CLEAR
SL AMB POCT COLOR,UA: YELLOW
SL AMB POCT KETONES,UA: NORMAL
SL AMB POCT NITRITE,UA: NORMAL
SL AMB POCT PH,UA: 7
SL AMB POCT SPECIFIC GRAVITY,UA: 1
SL AMB POCT URINE PROTEIN: NORMAL
SL AMB POCT UROBILINOGEN: 1
SL AMB POCT WET MOUNT: POSITIVE
T VAGINALIS VAG QL WET PREP: NEGATIVE
YEAST VAG QL WET PREP: POSITIVE

## 2021-10-26 PROCEDURE — 87210 SMEAR WET MOUNT SALINE/INK: CPT | Performed by: NURSE PRACTITIONER

## 2021-10-26 PROCEDURE — 81002 URINALYSIS NONAUTO W/O SCOPE: CPT | Performed by: NURSE PRACTITIONER

## 2021-10-26 PROCEDURE — 99213 OFFICE O/P EST LOW 20 MIN: CPT | Performed by: NURSE PRACTITIONER

## 2021-10-26 RX ORDER — METRONIDAZOLE 500 MG/1
500 TABLET ORAL EVERY 12 HOURS SCHEDULED
Qty: 14 TABLET | Refills: 0 | Status: SHIPPED | OUTPATIENT
Start: 2021-10-26 | End: 2021-11-02

## 2021-10-26 RX ORDER — FLUCONAZOLE 150 MG/1
150 TABLET ORAL ONCE
Qty: 1 TABLET | Refills: 0 | Status: SHIPPED | OUTPATIENT
Start: 2021-10-26 | End: 2021-10-26

## 2021-11-15 ENCOUNTER — TELEPHONE (OUTPATIENT)
Dept: INTERNAL MEDICINE CLINIC | Facility: CLINIC | Age: 21
End: 2021-11-15

## 2022-01-04 ENCOUNTER — IMMUNIZATIONS (OUTPATIENT)
Dept: FAMILY MEDICINE CLINIC | Facility: HOSPITAL | Age: 22
End: 2022-01-04

## 2022-01-04 DIAGNOSIS — Z23 ENCOUNTER FOR IMMUNIZATION: Primary | ICD-10-CM

## 2022-01-04 PROCEDURE — 91306 COVID-19 MODERNA VACC 0.25 ML BOOSTER: CPT

## 2022-01-04 PROCEDURE — 0064A COVID-19 MODERNA VACC 0.25 ML BOOSTER: CPT

## 2022-01-17 ENCOUNTER — TELEMEDICINE (OUTPATIENT)
Dept: BEHAVIORAL/MENTAL HEALTH CLINIC | Facility: CLINIC | Age: 22
End: 2022-01-17
Payer: COMMERCIAL

## 2022-01-17 DIAGNOSIS — F41.1 GENERALIZED ANXIETY DISORDER: Primary | ICD-10-CM

## 2022-01-17 PROCEDURE — 90791 PSYCH DIAGNOSTIC EVALUATION: CPT | Performed by: COUNSELOR
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
## 2022-01-25 ENCOUNTER — TELEMEDICINE (OUTPATIENT)
Dept: BEHAVIORAL/MENTAL HEALTH CLINIC | Facility: CLINIC | Age: 22
End: 2022-01-25
Payer: COMMERCIAL

## 2022-01-25 DIAGNOSIS — F32.A DEPRESSION, UNSPECIFIED DEPRESSION TYPE: Primary | ICD-10-CM

## 2022-01-25 DIAGNOSIS — F41.1 GENERALIZED ANXIETY DISORDER: ICD-10-CM

## 2022-01-25 PROCEDURE — 90834 PSYTX W PT 45 MINUTES: CPT | Performed by: COUNSELOR

## 2022-01-25 NOTE — PSYCH
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  She report working regular to build up money  Clinician processed symptoms of anxiety and discussed treatment plan goals  Clinician and and Vision worked together to formulate goals and complete treatment plan  Vision reports wanting to work on greater management of anxiety symptoms through coping skills and emotional expression  A: Vision was open and engaged in the session and participated meaningfully in the creation of her treatment plan  P: Continue to meet with Vision every other week for individual therapy  Behavioral Health Treatment Plan [facility]: Diagnosis and Treatment Plan explained to Vision, Vision relates understanding diagnosis and is agreeable to Treatment Plan   Yes

## 2022-01-25 NOTE — BH TREATMENT PLAN
Jules Camilo  2000       Date of Initial Treatment Plan: 1/25/22  Date of Current Treatment Plan: 01/25/22    Treatment Plan Number 1    Strengths/Personal Resources for Self Care: Good work ethic, motivation, supportive friends and good communication  Diagnosis:   1  Depression, unspecified depression type     2  Generalized anxiety disorder         Area of Needs: Increase use of coping skills and emotional expression      Long Term Goal 1: Decrease overall frequency, duration and intensity of anxiety so that daily functioning is not impaired    Target Date: 6/24/22  Completion Date: 7/24/22         Short Term Objectives for Goal 1: 1  Develop positive self talk   2  Identify and express feelings in therapy  3  Communicate thoughts and feelings to supportive others in life   4  Learn and implement coping skills       GOAL 1: Modality: Individual 2x per month   Completion Date 7/24/22 and The person(s) responsible for carrying out the plan is  Vision and [facility]: Diagnosis and Treatment Plan explained to Fior Juarez relates understanding diagnosis and is agreeable to Treatment Plan  Treatment plan was completed via telehealth due to [address] distancing  Vision gave verbal consent to the completion of the treatment plan

## 2022-02-01 ENCOUNTER — TELEMEDICINE (OUTPATIENT)
Dept: BEHAVIORAL/MENTAL HEALTH CLINIC | Facility: CLINIC | Age: 22
End: 2022-02-01
Payer: COMMERCIAL

## 2022-02-01 DIAGNOSIS — F32.A DEPRESSION, UNSPECIFIED DEPRESSION TYPE: Primary | ICD-10-CM

## 2022-02-01 PROCEDURE — 90834 PSYTX W PT 45 MINUTES: CPT | Performed by: COUNSELOR

## 2022-02-01 NOTE — PSYCH
Virtual Regular Visit    Verification of patient location:    Patient is located in the following state in which I hold an active license [place]      Assessment/Plan:    Problem List Items Addressed This Visit        Other    Depression - Primary          Goals addressed in session: Goal 1 and Goal 2          Reason for visit is No chief complaint on file  Encounter provider Gena Ly    Provider located at [address] [place] [id number] [phone]      Recent Visits  Date Type Provider Dept   01/25/22 310 [facility]   Showing recent visits within past 7 days and meeting all other requirements  Future Appointments  No visits were found meeting these conditions  Showing future appointments within next 150 days and meeting all other requirements       The patient was identified by name and date of birth  Liane Plascencia was informed that this is a telemedicine visit and that the visit is being conducted throughEpic Embedded and patient was informed this is a secure, HIPAA-complaint platform  She agrees to proceed  My office door was closed  No one else was in the room  She acknowledged consent and understanding of privacy and security of the video platform  The patient has agreed to participate and understands they can discontinue the visit at any time  Patient is aware this is a billable service  Subjective  Liane Plascencia is a 22 y o  female     D: Clinician met with [facility] via telehealth for individual therapy  Vision discussed follow through with handing in all necessary documents to apply for [address] [employer]  She reports some relief from stress but still feeling anxious about upcoming interview and decision  Clinician explored Vision's use of coping skills and management of stress overall   Vision discussed upcoming vacations and time spend recharging  Clinician validated and reinforced use of self care  A: Vision was open and engaged int he session and presented with stable mood and affect  P: Continue to meet with [facility] every other week for individual therapy  HPI     Past Medical History:   Diagnosis Date    Anxiety     Depression 2/1/2022    FTND (full term normal delivery) 2001    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 age 4 or 4 - has EKG done every 2 years    Severe menstrual cramps     Visual impairment     Yeast infection        Past Surgical History:   Procedure Laterality Date    NO PAST SURGERIES         Current Outpatient Medications   Medication Sig Dispense Refill    diazepam (VALIUM) 5 mg tablet Take 1 tablet (5 mg total) by mouth 3 (three) times a day for 2 days (Patient not taking: Reported on 8/14/2020) 6 tablet 0    etonogestrel (NEXPLANON) subdermal implant Inject 68 mg under the skin      loratadine (CLARITIN) 10 mg tablet Take 1 tablet (10 mg total) by mouth daily 30 tablet 0    LORazepam (ATIVAN) 0 5 mg tablet Take 1 tablet (0 5 mg total) by mouth every 8 (eight) hours as needed for anxiety (Patient not taking: Reported on 10/26/2021) 20 tablet 0     No current facility-administered medications for this visit  No Known Allergies    Review of Systems    Video Exam    There were no vitals filed for this visit  Physical Exam     I spent 45 minutes directly with the patient during this visit    VIRTUAL VISIT Keerthi Froylan Onel verbally agrees to participate in [employer]  Pt is aware that Virtual Care Services could be limited without vital signs or the ability to perform a full hands-on physical Ricke Lennox understands she or the provider may request at any time to terminate the video visit and request the patient to seek care or treatment in person

## 2022-02-08 ENCOUNTER — TELEMEDICINE (OUTPATIENT)
Dept: BEHAVIORAL/MENTAL HEALTH CLINIC | Facility: CLINIC | Age: 22
End: 2022-02-08
Payer: COMMERCIAL

## 2022-02-08 DIAGNOSIS — F32.A DEPRESSION, UNSPECIFIED DEPRESSION TYPE: ICD-10-CM

## 2022-02-08 DIAGNOSIS — F41.1 GENERALIZED ANXIETY DISORDER: Primary | ICD-10-CM

## 2022-02-08 PROCEDURE — 90834 PSYTX W PT 45 MINUTES: CPT | Performed by: COUNSELOR

## 2022-02-08 NOTE — PSYCH
Virtual Regular Visit    Verification of patient location:    Patient is located in the following state in which I [place] an active license [place]      Assessment/Plan:    Problem List Items Addressed This Visit        Other    Generalized anxiety disorder - Primary    Depression          Goals addressed in session: Goal 1 and Goal 2          Reason for visit is No chief complaint on file  Encounter provider Patricia Marquez    Provider located at [address] [place] [id number] [phone]      Recent Visits  Date Type Provider Dept   02/01/22 310 [facility]   Showing recent visits within past 7 days and meeting all other requirements  Future Appointments  No visits were found meeting these conditions  Showing future appointments within next 150 days and meeting all other requirements       The patient was identified by name and date of birth  Angeli Wong was informed that this is a telemedicine visit and that the visit is being conducted throughEpic Embedded and patient was informed this is a secure, HIPAA-complaint platform  She agrees to proceed  My office door was closed  No one else was in the room  She acknowledged consent and understanding of privacy and security of the video platform  The patient has agreed to participate and understands they can discontinue the visit at any time  Patient is aware this is a billable service  Subjective  Angeli Wong is a 22 y o  female      D: Clinician met with [facility] via telehealth for individual therapy  Vision discussed symptoms of anxiety and not being present at times in her daily life  Clinician validated and normalized her experience and discussed ways she manages these feelings along with techniques to work on mindfulness   Vision reports that she has used the [employer] in the past but not consistently  Clinician encouraged more consistent use and starting slowly to build a better scheduled of mindfulness practice  Vision reports anxiety related to waiting to hear back on her schooling and her options for housing in the future  A: Vision was open and engaged in the session and reports stable mood  P: Continue to meet with [facility] weekly for individual therapy  HPI     Past Medical History:   Diagnosis Date    Anxiety     Depression 2/1/2022    FTND (full term normal delivery) 2001    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[facility])     around age 4 or 4 - has EKG done every 2 years    Severe menstrual cramps     Visual impairment     Yeast infection        Past Surgical History:   Procedure Laterality Date    NO PAST SURGERIES         Current Outpatient Medications   Medication Sig Dispense Refill    diazepam (VALIUM) 5 mg tablet Take 1 tablet (5 mg total) by mouth 3 (three) times a day for 2 days (Patient not taking: Reported on 8/14/2020) 6 tablet 0    etonogestrel (NEXPLANON) subdermal implant Inject 68 mg under the skin      loratadine (CLARITIN) 10 mg tablet Take 1 tablet (10 mg total) by mouth daily 30 tablet 0    LORazepam (ATIVAN) 0 5 mg tablet Take 1 tablet (0 5 mg total) by mouth every 8 (eight) hours as needed for anxiety (Patient not taking: Reported on 10/26/2021) 20 tablet 0     No current facility-administered medications for this visit  No Known Allergies    Review of Systems    Video Exam    There were no vitals filed for this visit  Physical Exam     I spent 40 minutes directly with the patient during this visit    VIRTUAL VISIT Keerthi Sykes verbally agrees to participate in [employer]   Pt is aware that Virtual Care Services could be limited without vital signs or the ability to perform a full hands-on physical Mayuri Matters understands she or the provider may request at any time to terminate the video visit and request the patient to seek care or treatment in person

## 2022-02-22 ENCOUNTER — TELEMEDICINE (OUTPATIENT)
Dept: BEHAVIORAL/MENTAL HEALTH CLINIC | Facility: CLINIC | Age: 22
End: 2022-02-22
Payer: COMMERCIAL

## 2022-02-22 DIAGNOSIS — F41.1 GENERALIZED ANXIETY DISORDER: Primary | ICD-10-CM

## 2022-02-22 DIAGNOSIS — F32.A DEPRESSION, UNSPECIFIED DEPRESSION TYPE: ICD-10-CM

## 2022-02-22 PROCEDURE — 90834 PSYTX W PT 45 MINUTES: CPT | Performed by: COUNSELOR

## 2022-02-22 NOTE — PSYCH
Virtual Regular Visit    Verification of patient location:    Patient is located in the following state in which I [place] an active license [place]      Assessment/Plan:    Problem List Items Addressed This Visit        Other    Generalized anxiety disorder - Primary    Depression          Goals addressed in session: Goal 1 and Goal 2          Reason for visit is No chief complaint on file  Encounter provider Patricia Marquez    Provider located at [address] [place] [id number]  [phone]      Recent Visits  No visits were found meeting these conditions  Showing recent visits within past 7 days and meeting all other requirements  Future Appointments  No visits were found meeting these conditions  Showing future appointments within next 150 days and meeting all other requirements       The patient was identified by name and date of birth  Angeli Wong was informed that this is a telemedicine visit and that the visit is being conducted throughEpic Embedded and patient was informed this is a secure, HIPAA-complaint platform  She agrees to proceed     My office door was closed  No one else was in the room  She acknowledged consent and understanding of privacy and security of the video platform  The patient has agreed to participate and understands they can discontinue the visit at any time  Patient is aware this is a billable service  Subjective  Angeli Wong is a 22 y o  female     D: Clinician met with [facility] via telehealth for individual therapy  Vision discussed recent trip taken with her paramour and the benefits of taking some time away from work and her interests in traveling in the future  Clinician explored and reinforced benefits  Vision reports when she returned her anxiety has increased and she work up [address] her return to work   Clinician explored her feelings and processed way she was able to manage those symptoms  Vision reports choosing to not take on any additional shifts for a few days, go to bed early and go to the gym to reset  Clinician praised follow through with healthy coping skills  A: Vision was open and engaged in the session and presented with stable mood and affect  P: Continue to meet with [facility] every other week for individual therapy  HPI     Past Medical History:   Diagnosis Date    Anxiety     Depression 2/1/2022    FTND (full term normal delivery) 2001    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[facility])     around age 4 or 4 - has EKG done every 2 years    Severe menstrual cramps     Visual impairment     Yeast infection        Past Surgical History:   Procedure Laterality Date    NO PAST SURGERIES         Current Outpatient Medications   Medication Sig Dispense Refill    diazepam (VALIUM) 5 mg tablet Take 1 tablet (5 mg total) by mouth 3 (three) times a day for 2 days (Patient not taking: Reported on 8/14/2020) 6 tablet 0    etonogestrel (NEXPLANON) subdermal implant Inject 68 mg under the skin      loratadine (CLARITIN) 10 mg tablet Take 1 tablet (10 mg total) by mouth daily 30 tablet 0    LORazepam (ATIVAN) 0 5 mg tablet Take 1 tablet (0 5 mg total) by mouth every 8 (eight) hours as needed for anxiety (Patient not taking: Reported on 10/26/2021) 20 tablet 0     No current facility-administered medications for this visit  No Known Allergies    Review of Systems    Video Exam    There were no vitals filed for this visit  Physical Exam     I spent 45 minutes directly with the patient during this visit    VIRTUAL VISIT Sebastianloan Sykes verbally agrees to participate in [employer]   Pt is aware that Virtual Care Services could be limited without vital signs or the ability to perform a full hands-on physical Tonda Essex understands she or the provider may request at any time to terminate the video visit and request the patient to seek care or treatment in person

## 2022-02-24 ENCOUNTER — OFFICE VISIT (OUTPATIENT)
Dept: URGENT CARE | Age: 22
End: 2022-02-24
Payer: MEDICARE

## 2022-02-24 VITALS
SYSTOLIC BLOOD PRESSURE: 143 MMHG | RESPIRATION RATE: 16 BRPM | HEIGHT: 61 IN | OXYGEN SATURATION: 98 % | WEIGHT: 185 LBS | HEART RATE: 72 BPM | TEMPERATURE: 97.8 F | BODY MASS INDEX: 34.93 KG/M2 | DIASTOLIC BLOOD PRESSURE: 75 MMHG

## 2022-02-24 DIAGNOSIS — H10.31 ACUTE BACTERIAL CONJUNCTIVITIS OF RIGHT EYE: Primary | ICD-10-CM

## 2022-02-24 PROCEDURE — 99204 OFFICE O/P NEW MOD 45 MIN: CPT | Performed by: FAMILY MEDICINE

## 2022-02-24 RX ORDER — OFLOXACIN 3 MG/ML
2 SOLUTION/ DROPS OPHTHALMIC 4 TIMES DAILY
Qty: 5 ML | Refills: 0 | Status: SHIPPED | OUTPATIENT
Start: 2022-02-24 | End: 2022-03-25 | Stop reason: ALTCHOICE

## 2022-02-24 NOTE — PROGRESS NOTES
[facility]        NAME: Radha Christopher is a 22 y o  female  : 2000    MRN: [id number]  DATE: 2022  TIME: 11:24 AM    Assessment and Plan   Acute bacterial conjunctivitis of right eye [H10 31]  1  Acute bacterial conjunctivitis of right eye  ofloxacin (OCUFLOX) 0 3 % ophthalmic solution         Patient Instructions     Ophthalmic eye abx given today to cover for pseudomonas  Explained contagious nature of pink eye  Avoid rubbing eye and wash hands frequently  Follow-up with PCP in the next 3-5 days if no improvement  Go to the ED if symptoms severely worsen  Chief Complaint     Chief Complaint   Patient presents with    Eye Pain     R eye itch/burn w/ yellow-green drainage, +erythema since false lash application Tues (removed)         History of Present Illness     Vision Cindi Joshua is a 22 y o  female presenting to the office today for eye itching  Symptoms have been present for 2 days, and include irritation with fake eyelashes during application  Pain and redness have been increasing  She has tried nothing for her symptoms  Sick contacts include: none  Recent travel: none    Review of Systems     Review of Systems   Constitutional: Negative for chills and fever  HENT: Negative for congestion, facial swelling, postnasal drip, sinus pressure, sinus pain and sore throat  Eyes: Positive for pain, discharge and redness  Respiratory: Negative for cough and shortness of breath  Skin: Negative for rash and wound  Allergic/Immunologic: Negative for environmental allergies  Neurological: Negative for dizziness, facial asymmetry, light-headedness and headaches  Psychiatric/Behavioral: Negative for agitation  The patient is not nervous/anxious          Current Medications       Current Outpatient Medications:     etonogestrel (NEXPLANON) subdermal implant, Inject 68 mg under the skin, Disp: , Rfl:     diazepam (VALIUM) 5 mg tablet, Take 1 tablet (5 mg total) by mouth 3 (three) times a day for 2 days (Patient not taking: Reported on 8/14/2020), Disp: 6 tablet, Rfl: 0    loratadine (CLARITIN) 10 mg tablet, Take 1 tablet (10 mg total) by mouth daily (Patient not taking: Reported on 2/24/2022 ), Disp: 30 tablet, Rfl: 0    LORazepam (ATIVAN) 0 5 mg tablet, Take 1 tablet (0 5 mg total) by mouth every 8 (eight) hours as needed for anxiety (Patient not taking: Reported on 10/26/2021), Disp: 20 tablet, Rfl: 0    ofloxacin (OCUFLOX) 0 3 % ophthalmic solution, Administer 2 drops to the right eye 4 (four) times a day, Disp: 5 mL, Rfl: 0    Current Allergies     Allergies as of 02/24/2022    (No Known Allergies)            The following portions of the patient's history were reviewed and updated as appropriate: allergies, current medications, past family history, past medical history, past social history, past surgical history and problem list      Past Medical History:   Diagnosis Date    Anxiety     Depression 2/1/2022    FTND (full term normal delivery) 2001    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[facility] [address] )     around age 4 or 4 - has EKG done every 2 years    Severe menstrual cramps     Visual impairment     Yeast infection        Past Surgical History:   Procedure Laterality Date    NO PAST SURGERIES         Family History   Problem Relation Age of Onset    No Known Problems Mother     No Known Problems Father     No Known Problems Sister     No Known Problems Brother     No Known Problems Sister     No Known Problems Sister     No Known Problems Brother     No Known Problems Brother     Cancer Family         colon    Breast cancer Paternal Grandmother     Diabetes Paternal Grandmother     Hypertension Maternal Grandmother        Medications have been verified  Objective     /75   Pulse 72   Temp 97 8 °F (36 6 °C)   Resp 16   Ht 5' 1" (1 549 m)   Wt 83 9 kg (185 lb)   LMP 02/03/2022   SpO2 98%   BMI 34 96 kg/m²   Patient's last menstrual period was 02/03/2022  Physical Exam     Physical Exam  Vitals reviewed  Constitutional:       General: She is not in acute distress  Appearance: Normal appearance  She is not ill-appearing  HENT:      Head: Normocephalic and atraumatic  Eyes:      General: Lids are normal  Vision grossly intact  Right eye: Discharge present  No hordeolum  Left eye: No discharge or hordeolum  Extraocular Movements: Extraocular movements intact  Conjunctiva/sclera:      Right eye: Right conjunctiva is injected  Left eye: No exudate  Pulmonary:      Effort: Pulmonary effort is normal  No respiratory distress  Musculoskeletal:      Cervical back: Normal range of motion and neck supple  No tenderness  Skin:     General: Skin is warm  Neurological:      General: No focal deficit present  Mental Status: She is alert     Psychiatric:         Mood and Affect: Mood normal          Behavior: Behavior normal

## 2022-02-24 NOTE — PATIENT INSTRUCTIONS
Conjunctivitis   AMBULATORY CARE:   Conjunctivitis,  or pink eye, is inflammation of your conjunctiva  The conjunctiva is a thin tissue that covers the front of your eye and the back of your eyelids  The conjunctiva helps protect your eye and keep it moist  Conjunctivitis may be caused by bacteria, allergies, or a virus  If your conjunctivitis is caused by bacteria, it may get better on its own in about 7 days  Viral conjunctivitis can last up to 3 weeks  Common symptoms may include any of the following: You will usually have symptoms in both eyes if your conjunctivitis is caused by allergies  You may also have other allergic symptoms, such as a rash or runny nose  Symptoms will usually start in 1 eye if your conjunctivitis is caused by a virus or bacteria  · Redness in the whites of your eye    · Itching in your eye or around your eye    · Feeling like there is something in your eye    · Watery or thick, sticky discharge    · Crusty eyelids when you wake up in the morning    · Burning, stinging, or swelling in your eye    · Pain when you see bright light    Seek care immediately if:   · You have worsening eye pain  · The swelling in your eye gets worse, even after treatment  · Your vision suddenly becomes worse or you cannot see at all  Contact your healthcare provider if:   · You develop a fever and ear pain  · You have tiny bumps or spots of blood on your eye  · You have questions or concerns about your condition or care  Treatment  will depend on the cause of your conjunctivitis  You may need antibiotics or allergy medicine as a pill, eye drop, or eye ointment  Manage your symptoms:   · Apply a cool compress  Wet a washcloth with cold water and place it on your eye  This will help decrease itching and irritation  · Do not wear contact lenses  They can irritate your eye  Throw away the pair you are using and ask when you can wear them again   Use a new pair of lenses when your healthcare provider says it is okay  · Avoid irritants  Stay away from smoke filled areas  Shield your eyes from wind and sun  · Flush your eye  You may need to flush your eye with saline to help decrease your symptoms  Ask for more information on how to flush your eye  Medicines:  Treatment depends on what is causing your conjunctivitis  You may be given any of the following:  · Allergy medicine  helps decrease itchy, red, swollen eyes caused by allergies  It may be given as a pill, eye drops, or nasal spray  · Antibiotics  may be needed if your conjunctivitis is caused by bacteria  This medicine may be given as a pill, eye drops, or eye ointment  · Take your medicine as directed  Contact your healthcare provider if you think your medicine is not helping or if you have side effects  Tell him or her if you are allergic to any medicine  Keep a list of the medicines, vitamins, and herbs you take  Include the amounts, and when and why you take them  Bring the list or the pill bottles to follow-up visits  Carry your medicine list with you in case of an emergency  Prevent the spread of conjunctivitis:   · Wash your hands with soap and water often  Wash your hands before and after you touch your eyes  Also wash your hands before you prepare or eat food and after you use the bathroom or change a diaper  · Avoid allergens  Try to avoid the things that cause your allergies, such as pets, dust, or grass  · Avoid contact with others  Do not share towels or washcloths  Try to stay away from others as much as possible  Ask when you can return to work or school  · Throw away eye makeup  The bacteria that caused your conjunctivitis can stay in eye makeup  Throw away mascara and other eye makeup  © Copyright [employer] 2021 Information is for End User's use only and may not be sold, redistributed or otherwise used for commercial purposes   All illustrations and images included in CareNotes® are the copyrighted property of [employer]  or Jaime Del [employer]   The above information is an  only  It is not intended as medical advice for individual conditions or treatments  Talk to your doctor, nurse or pharmacist before following any medical regimen to see if it is safe and effective for you

## 2022-03-07 ENCOUNTER — TELEMEDICINE (OUTPATIENT)
Dept: BEHAVIORAL/MENTAL HEALTH CLINIC | Facility: CLINIC | Age: 22
End: 2022-03-07
Payer: COMMERCIAL

## 2022-03-07 DIAGNOSIS — F32.A DEPRESSION, UNSPECIFIED DEPRESSION TYPE: ICD-10-CM

## 2022-03-07 DIAGNOSIS — F41.1 GENERALIZED ANXIETY DISORDER: Primary | ICD-10-CM

## 2022-03-07 PROCEDURE — 90834 PSYTX W PT 45 MINUTES: CPT | Performed by: COUNSELOR

## 2022-03-07 NOTE — PSYCH
Virtual Regular Visit    Verification of patient location:    Patient is located in the following state in which I hold an active license [place]      Assessment/Plan:    Problem List Items Addressed This Visit        Other    Generalized anxiety disorder - Primary    Depression          Goals addressed in session: Goal 1 and Goal 2          Reason for visit is No chief complaint on file  Encounter provider Sue Rosa    Provider located at [address] [place] [address] [id number] [phone]      Recent Visits  No visits were found meeting these conditions  Showing recent visits within past 7 days and meeting all other requirements  Future Appointments  No visits were found meeting these conditions  Showing future appointments within next 150 days and meeting all other requirements       The patient was identified by name and date of birth  Temo Zamarripa was informed that this is a telemedicine visit and that the visit is being conducted throughSynerscopeic Embedded and patient was informed this is a secure, HIPAA-complaint platform  She agrees to proceed     My office door was closed  No one else was in the room  She acknowledged consent and understanding of privacy and security of the video platform  The patient has agreed to participate and understands they can discontinue the visit at any time  Patient is aware this is a billable service  Subjective  Temo Zamarripa is a 22 y o  female     D: Clinician met with [facility] via telehealth for individual therapy  Vision discussed upcoming trip to [facility] for her birthday and excitement  Clinician explored feelings and benefits of regular time away from work  Vision reports increased fear when leaving work from second shift and strategies she utilizes to calm herself  Clinician validated use of strategies and explored fears   Clinician discussed ways to make herself feel more safe and protect herself along with trusting gut instincts  A: Vision was open and engaged in the session  She report desire to work on decrease anxiety and fear when leaving work and walking from her car to her apartment  P: Continue to meet with [facility] every other week for individual therapy  HPI     Past Medical History:   Diagnosis Date    Anxiety     Depression 2/1/2022    FTND (full term normal delivery) 2001    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[facility])     around age 4 or 4 - has EKG done every 2 years    Severe menstrual cramps     Visual impairment     Yeast infection        Past Surgical History:   Procedure Laterality Date    NO PAST SURGERIES         Current Outpatient Medications   Medication Sig Dispense Refill    diazepam (VALIUM) 5 mg tablet Take 1 tablet (5 mg total) by mouth 3 (three) times a day for 2 days (Patient not taking: Reported on 8/14/2020) 6 tablet 0    etonogestrel (NEXPLANON) subdermal implant Inject 68 mg under the skin      loratadine (CLARITIN) 10 mg tablet Take 1 tablet (10 mg total) by mouth daily (Patient not taking: Reported on 2/24/2022 ) 30 tablet 0    LORazepam (ATIVAN) 0 5 mg tablet Take 1 tablet (0 5 mg total) by mouth every 8 (eight) hours as needed for anxiety (Patient not taking: Reported on 10/26/2021) 20 tablet 0    ofloxacin (OCUFLOX) 0 3 % ophthalmic solution Administer 2 drops to the right eye 4 (four) times a day 5 mL 0     No current facility-administered medications for this visit  No Known Allergies    Review of Systems    Video Exam    There were no vitals filed for this visit  Physical Exam     I spent 40 minutes directly with the patient during this visit    VIRTUAL VISIT Keerthi Sykes verbally agrees to participate in [employer]   Pt is aware that Virtual Care Services could be limited without vital signs or the ability to perform a full hands-on physical Rachel Marshall understands she or the provider may request at any time to terminate the video visit and request the patient to seek care or treatment in person

## 2022-03-21 ENCOUNTER — TELEMEDICINE (OUTPATIENT)
Dept: BEHAVIORAL/MENTAL HEALTH CLINIC | Facility: CLINIC | Age: 22
End: 2022-03-21
Payer: COMMERCIAL

## 2022-03-21 DIAGNOSIS — F41.1 GENERALIZED ANXIETY DISORDER: Primary | ICD-10-CM

## 2022-03-21 DIAGNOSIS — F32.A DEPRESSION, UNSPECIFIED DEPRESSION TYPE: ICD-10-CM

## 2022-03-21 PROCEDURE — 90834 PSYTX W PT 45 MINUTES: CPT | Performed by: COUNSELOR

## 2022-03-21 NOTE — PSYCH
Virtual Regular Visit    Verification of patient location:    Patient is located in the following state in which I hold an active license [place]      Assessment/Plan:    Problem List Items Addressed This Visit        Other    Generalized anxiety disorder - Primary    Depression          Goals addressed in session: Goal 1 and Goal 2          Reason for visit is No chief complaint on file  Encounter provider Rah Covarrubias    Provider located at [address] [place] [address] Ant Goodman  [facility] [id number]  [phone]      Recent Visits  No visits were found meeting these conditions  Showing recent visits within past 7 days and meeting all other requirements  Future Appointments  No visits were found meeting these conditions  Showing future appointments within next 150 days and meeting all other requirements       The patient was identified by name and date of birth  Jacinto Rodriguez was informed that this is a telemedicine visit and that the visit is being conducted throughEpic Embedded and patient was informed this is a secure, HIPAA-complaint platform  She agrees to proceed  My office door was closed  No one else was in the room  She acknowledged consent and understanding of privacy and security of the video platform  The patient has agreed to participate and understands they can discontinue the visit at any time  Patient is aware this is a billable service  Subjective  Jacinto Rodriguez is a 25 y o  female     D: Clinician met with [facility] via telehealth for individual therapy  Vision reports excitement in finding out that 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  She reports following through with plans to move into campus housing in the next few weeks  Clinician explored use of time management skills and goals setting   She reports taking things one step at a time and working towards setting up self care through regular exercise  Clinician validated and reinforced follow through  Vision discussed stress related to her sister's current mental state and feeling guilty about not being able to help her out more  Clinician reframed thinking and discussed ways she can support sister while still preserving herself  A: [facility] was open and engaged in the session  She was tearful when discussing sisters current situation but was stable when session wrapped  P: Continue to meet with [facility] weekly for individual therapy  HPI     Past Medical History:   Diagnosis Date    Anxiety     Depression 2/1/2022    FTND (full term normal delivery) 2001    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[facility])     around age 4 or 4 - has EKG done every 2 years    Severe menstrual cramps     Visual impairment     Yeast infection        Past Surgical History:   Procedure Laterality Date    NO PAST SURGERIES         Current Outpatient Medications   Medication Sig Dispense Refill    diazepam (VALIUM) 5 mg tablet Take 1 tablet (5 mg total) by mouth 3 (three) times a day for 2 days (Patient not taking: Reported on 8/14/2020) 6 tablet 0    etonogestrel (NEXPLANON) subdermal implant Inject 68 mg under the skin      loratadine (CLARITIN) 10 mg tablet Take 1 tablet (10 mg total) by mouth daily (Patient not taking: Reported on 2/24/2022 ) 30 tablet 0    LORazepam (ATIVAN) 0 5 mg tablet Take 1 tablet (0 5 mg total) by mouth every 8 (eight) hours as needed for anxiety (Patient not taking: Reported on 10/26/2021) 20 tablet 0    ofloxacin (OCUFLOX) 0 3 % ophthalmic solution Administer 2 drops to the right eye 4 (four) times a day 5 mL 0     No current facility-administered medications for this visit  No Known Allergies    Review of Systems    Video Exam    There were no vitals filed for this visit      Physical Exam     I spent 45 minutes directly with the patient during this visit    VIRTUAL VISIT Keerthi Sykes verbally agrees to participate in [facility]  Pt is aware that Virtual Care Services could be limited without vital signs or the ability to perform a full hands-on physical Eronivelisse Astudillo understands she or the provider may request at any time to terminate the video visit and request the patient to seek care or treatment in person

## 2022-03-25 ENCOUNTER — HOSPITAL ENCOUNTER (OUTPATIENT)
Dept: RADIOLOGY | Facility: HOSPITAL | Age: 22
Discharge: HOME | End: 2022-03-25
Payer: MEDICARE

## 2022-03-25 ENCOUNTER — OFFICE VISIT (OUTPATIENT)
Dept: INTERNAL MEDICINE CLINIC | Facility: CLINIC | Age: 22
End: 2022-03-25

## 2022-03-25 VITALS
HEART RATE: 61 BPM | TEMPERATURE: 98.3 F | WEIGHT: 186.6 LBS | SYSTOLIC BLOOD PRESSURE: 106 MMHG | BODY MASS INDEX: 35.26 KG/M2 | OXYGEN SATURATION: 99 % | DIASTOLIC BLOOD PRESSURE: 67 MMHG

## 2022-03-25 DIAGNOSIS — M54.41 CHRONIC RIGHT-SIDED LOW BACK PAIN WITH RIGHT-SIDED SCIATICA: ICD-10-CM

## 2022-03-25 DIAGNOSIS — M54.41 CHRONIC RIGHT-SIDED LOW BACK PAIN WITH RIGHT-SIDED SCIATICA: Primary | ICD-10-CM

## 2022-03-25 DIAGNOSIS — G89.29 CHRONIC RIGHT-SIDED LOW BACK PAIN WITH RIGHT-SIDED SCIATICA: Primary | ICD-10-CM

## 2022-03-25 DIAGNOSIS — G89.29 CHRONIC RIGHT-SIDED LOW BACK PAIN WITH RIGHT-SIDED SCIATICA: ICD-10-CM

## 2022-03-25 PROCEDURE — 72100 X-RAY EXAM L-S SPINE 2/3 VWS: CPT

## 2022-03-25 PROCEDURE — 99213 OFFICE O/P EST LOW 20 MIN: CPT | Performed by: INTERNAL MEDICINE

## 2022-03-25 PROCEDURE — 73502 X-RAY EXAM HIP UNI 2-3 VIEWS: CPT

## 2022-03-25 NOTE — PROGRESS NOTES
[facility]  INTERNAL MEDICINE OFFICE VISIT     PATIENT INFORMATION     [facility] Larry Winn   25 y o  female   MRN: [id number]    ASSESSMENT/PLAN     Diagnoses and all orders for this visit:    Chronic right-sided low back pain with right-sided sciatica  Pain intermittent now for 2 years but increasingly more frequent as of late  Symptoms of lower back pressure with shooting pain down her right leg  Pain better with bending over, squatting, and lifting weights  Pain worse with prolonged walking  Has not been treating with any oral analgesics  Has not gotten imaging for this in the past  Saw PT last year, which she says was helpful  PE notable for spinal tenderness at level of L2-L3, otherwise unremarkable  Negative SLR bilaterally  · Suspect lumbar radiculopathy with sciatica  · Will get XR lumbar spine and R hip/pelvis  · NSAIDs/tylenol PRN  · Heating pad PRN  · Referral to PT  · Instructions on lower back exercises provided  -     Ambulatory Referral to Physical Therapy; Future  -     XR spine lumbar 2 or 3 views injury; Future  -     XR hip/pelv 2-3 vws right if performed; Future      Schedule a follow-up appointment in 4 weeks if pain worsens/does not improve      HEALTH MAINTENANCE     Immunization History   Administered Date(s) Administered    COVID-19 MODERNA VACC 0 25 ML IM BOOSTER 01/04/2022    DTP 2001, 2001, 05/22/2003, 05/14/2004, 01/06/2006    HPV Quadrivalent 04/26/2012, 10/23/2012, 02/20/2013    Hep A, adult 05/05/2008, 11/13/2008    Hep B, adult 2001, 05/22/2003, 08/04/2004    Hib (PRP-OMP) 2001, 2001, 05/25/2011, 05/22/2013    IPV 01/06/2006    Influenza, injectable, quadrivalent, preservative free 0 5 mL 09/17/2020    Influenza, seasonal, injectable 01/22/2013    MMR 05/22/2003, 01/06/2006    Meningococcal B, Recombinant (Vanessa Ericka) 08/12/2019    Meningococcal MCV4P 08/10/2018    Meningococcal, Unknown Serogroups 04/26/2012  OPV 2001, 2001, 05/14/2004, 01/06/2006    Pneumococcal Conjugate PCV 7 2001    Tdap 04/26/2012    Tuberculin Skin Test-PPD Intradermal 05/14/2004    Varicella 07/10/2003, 11/20/2010    influenza, injectable, quadrivalent 11/08/2018     CHIEF COMPLAINT     Chief Complaint   Patient presents with    Follow-up     lower back pain- no improvement       HISTORY OF PRESENT ILLNESS     Vision Jovana Thomas is a 24 yo female with PMH of depression and anxiety who presents today for lower back pain  LBP has been intermittent for 2 years but recently started becoming more frequent to the point where she is having symptoms daily  Pain is pressure type pain across her lower back and shooting pain down her right leg  No symptoms in left leg  No numbness or tingling  No bowel and urinary incontinence  Pain is worse with prolonged periods of walking  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 and says that her pain, once it starts for the day, can persist for the rest of the day until she goes home at night and lays down  She is an avid weightlifter and says that pain is actually better with heavy lifting, squatting, and bending over  She went to PT for this same pain last year, which she says helped and she would like to go again  She has not been treating with any oral analgesics  She has never gotten imaging done for this  She notes recent weight gain and upon chart review, she gained 10 lbs between July and October of last year  She does not know why  She suspects it may be due to her putting on more muscle mass, as she exercises frequently and maintains a very healthy diet  She also has a Nexplanon inserted, which was put in in May 2020  REVIEW OF SYSTEMS     Review of Systems   Constitutional: Negative for activity change, chills, fatigue and fever  Respiratory: Negative for shortness of breath  Cardiovascular: Negative for chest pain, palpitations and leg swelling  Gastrointestinal: Negative for abdominal pain  Genitourinary: Negative for difficulty urinating, flank pain and urgency  Musculoskeletal: Positive for back pain  Negative for gait problem and neck pain  Neurological: Negative for dizziness, weakness, light-headedness, numbness and headaches  Psychiatric/Behavioral: Negative for dysphoric mood  OBJECTIVE     Vitals:    03/25/22 1113   BP: 106/67   BP Location: Left arm   Patient Position: Sitting   Cuff Size: Large   Pulse: 61   Temp: 98 3 °F (36 8 °C)   TempSrc: Temporal   SpO2: 99%   Weight: 84 6 kg (186 lb 9 6 oz)     Physical Exam  Constitutional:       General: She is not in acute distress  Eyes:      Extraocular Movements: Extraocular movements intact  Conjunctiva/sclera: Conjunctivae normal    Cardiovascular:      Rate and Rhythm: Normal rate and regular rhythm  Heart sounds: Normal heart sounds  Pulmonary:      Effort: Pulmonary effort is normal       Breath sounds: Normal breath sounds  Abdominal:      General: There is no distension  Tenderness: There is no abdominal tenderness  Musculoskeletal:      Cervical back: Neck supple  No tenderness or bony tenderness  No spinous process tenderness or muscular tenderness  Thoracic back: No tenderness or bony tenderness  Lumbar back: Bony tenderness present  Negative right straight leg raise test and negative left straight leg raise test       Comments: Central spinal tenderness at level of L2-L3  ROM of back and hip full without pain  5/5 strength in bilateral lower extremities   Skin:     General: Skin is warm and dry  Neurological:      General: No focal deficit present  Mental Status: She is alert  Motor: Motor function is intact  Psychiatric:         Behavior: Behavior normal  Behavior is cooperative         CURRENT MEDICATIONS     Current Outpatient Medications:     etonogestrel (NEXPLANON) subdermal implant, Inject 68 mg under the skin, Disp: , Rfl: PAST MEDICAL & SURGICAL HISTORY     Past Medical History:   Diagnosis Date    Anxiety     Depression 2/1/2022    FTND (full term normal delivery) 2001    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 age 4 or 4 - has EKG done every 2 years    Severe menstrual cramps     Visual impairment     Yeast infection      Past Surgical History:   Procedure Laterality Date    NO PAST SURGERIES       SOCIAL & FAMILY HISTORY     Social History     Socioeconomic History    Marital status: Single     Spouse name: Not on file    Number of children: Not on file    Years of education: Not on file    Highest education level: Not on file   Occupational History    Not on file   Tobacco Use    Smoking status: Never Smoker    Smokeless tobacco: Never Used   Vaping Use    Vaping Use: Former    Substances: Nicotine   Substance and Sexual Activity    Alcohol use: Not Currently     Comment: rarely    Drug use: Not Currently     Types: Marijuana     Comment: Occ    Sexual activity: Yes     Partners: Male     Birth control/protection: Condom Male, None, Implant   Other Topics Concern    Not on file   Social History Narrative    Not on file     Social Determinants of Health     Financial Resource Strain: Low Risk     Difficulty of Paying Living Expenses: Not hard at all   Food Insecurity: No Food Insecurity    Worried About Running Out of Food in the Last Year: Never true    Nancy of Food in the Last Year: Never true   Transportation Needs: No Transportation Needs    Lack of Transportation (Medical): No    Lack of Transportation (Non-Medical):  No   Physical Activity: Not on file   Stress: Not on file   Social Connections: Not on file   Intimate Partner Violence: Not on file   Housing Stability: Low Risk     Unable to Pay for Housing in the Last Year: No    Number of Places Lived in the Last Year: 1    Unstable Housing in the Last Year: No     Social History     Substance and Sexual Activity   Alcohol Use Not Currently    Comment: rarely     Social History     Substance and Sexual Activity   Drug Use Not Currently    Types: Marijuana    Comment: Occ     Social History     Tobacco Use   Smoking Status Never Smoker   Smokeless Tobacco Never Used     Family History   Problem Relation Age of Onset    No Known Problems Mother     No Known Problems Father     No Known Problems Sister     No Known Problems Brother     No Known Problems Sister     No Known Problems Sister     No Known Problems Brother     No Known Problems Brother     Cancer Family         colon    Breast cancer Paternal Grandmother     Diabetes Paternal Grandmother     Hypertension Maternal Grandmother              ==  Adina Jama MD PGY1  [facility] [address] [place] [address] , [address] [address] [facility] [facility]  Office: [phone]  Fax: [phone]

## 2022-03-25 NOTE — PATIENT INSTRUCTIONS
Please go for x-rays to check your back and hip  We will call you with results  I have referred you to physical therapy  They should call you to schedule  You can use tylenol or advil as needed for pain  You can also use a heating pad on your lower back  Please call us if your symptoms worsen or do not improve in 4 weeks  Sciatica   WHAT YOU NEED TO KNOW:   Sciatica is a condition that causes pain along your sciatic nerve  The sciatic nerve runs from your spine through both sides of your buttocks  It then runs down the back of your thigh, into your lower leg and foot  Your sciatic nerve may be compressed, inflamed, irritated, or stretched  DISCHARGE INSTRUCTIONS:   Medicines:   · NSAIDs:  These medicines decrease swelling and pain  NSAIDs are available without a doctor's order  Ask your healthcare provider which medicine is right for you  Ask how much to take and when to take it  Take as directed  NSAIDs can cause stomach bleeding or kidney problems if not taken correctly  · Acetaminophen: This medicine decreases pain  Acetaminophen is available without a doctor's order  Ask how much to take and when to take it  Follow directions  Acetaminophen can cause liver damage if not taken correctly  · Muscle relaxers  help decrease pain and muscle spasms  · Take your medicine as directed  Contact your healthcare provider if you think your medicine is not helping or if you have side effects  Tell him of her if you are allergic to any medicine  Keep a list of the medicines, vitamins, and herbs you take  Include the amounts, and when and why you take them  Bring the list or the pill bottles to follow-up visits  Carry your medicine list with you in case of an emergency  Follow up with your doctor as directed:  Write down your questions so you remember to ask them during your visits  Manage your symptoms:   · Activity:  Decrease your activity   Do not lift heavy objects or twist your back for at least 6 weeks  Slowly return to your usual activity  · Ice:  Ice helps decrease swelling and pain  Ice may also help prevent tissue damage  Use an ice pack, or put crushed ice in a plastic bag  Cover it with a towel and place it on your low back or leg for 15 to 20 minutes every hour or as directed  · Heat:  Heat helps decrease pain and muscle spasms  Apply heat on the area for 20 to 30 minutes every 2 hours for as many days as directed  · Physical therapy:  You may need to see physical therapist to teach you exercises to help improve movement and strength, and to decrease pain  An occupational therapist teaches you skills to help with your daily activities  · Use assistive devices if directed: You may need to wear back support, such as a back brace  You may need crutches, a cane, or a walker to decrease stress on your lower back and leg muscles  Ask your healthcare provider for more information about assistive devices and how to use them correctly  Self-care:   · Avoid pressure on your back and legs:  Do not  lift heavy objects, or stand or sit for long periods of time  · Lift objects safely:  Keep your back straight and bend your knees when you  an object  Do not bend or twist your back when you lift  · Maintain a healthy weight:  Ask your healthcare provider how much you should weigh  Ask him to help you create a weight loss plan if you are overweight  · Exercise:  Ask your healthcare provider about the best stretching, warmup, and exercise plan for you  Contact your healthcare provider if:   · You have pain in your lower back at night or when resting  · You have pain in your lower back with numbness below the knee  · You have weakness in one leg only  · You have questions or concerns about your condition or care  Return to the emergency department if:   · You have trouble holding back your urine or bowel movements  · You have weakness in both legs      · You have numbness in your groin or buttocks  © Copyright [employer] 2022 Information is for End User's use only and may not be sold, redistributed or otherwise used for commercial purposes  All illustrations and images included in CareNotes® are the copyrighted property of A D A M [employer] Inc  or Jaime Anders  The above information is an  only  It is not intended as medical advice for individual conditions or treatments  Talk to your doctor, nurse or pharmacist before following any medical regimen to see if it is safe and effective for you  Acute Low Back Pain   WHAT YOU NEED TO KNOW:   Acute low back pain is sudden discomfort that lasts up to 6 weeks and makes activity difficult  DISCHARGE INSTRUCTIONS:   Return to the emergency department if:   · You have severe pain  · You have sudden stiffness and heaviness on both buttocks down to both legs  · You have numbness or weakness in one leg, or pain in both legs  · You have numbness in your genital area or across your lower back  · You cannot control your urine or bowel movements  Call your doctor if:   · You have a fever  · You have pain at night or when you rest     · Your pain does not get better with treatment  · You have pain that worsens when you cough or sneeze  · You suddenly feel something pop or snap in your back  · You have questions or concerns about your condition or care  Medicines: You may need any of the following:  · NSAIDs , such as ibuprofen, help decrease swelling, pain, and fever  This medicine is available with or without a doctor's order  NSAIDs can cause stomach bleeding or kidney problems in certain people  If you take blood thinner medicine, always ask your healthcare provider if NSAIDs are safe for you  Always read the medicine label and follow directions  · Acetaminophen  decreases pain and fever  It is available without a doctor's order  Ask how much to take and how often to take it  Follow directions  Read the labels of all other medicines you are using to see if they also contain acetaminophen, or ask your doctor or pharmacist  Acetaminophen can cause liver damage if not taken correctly  Do not use more than 4 grams (4,000 milligrams) total of acetaminophen in one day  · Muscle relaxers  decrease pain by relaxing the muscles in your lower spine  · Prescription pain medicine  may be given  Ask your healthcare provider how to take this medicine safely  Some prescription pain medicines contain acetaminophen  Do not take other medicines that contain acetaminophen without talking to your healthcare provider  Too much acetaminophen may cause liver damage  Prescription pain medicine may cause constipation  Ask your healthcare provider how to prevent or treat constipation  Self-care:   · Stay active  as much as you can without causing more pain  Bed rest could make your back pain worse  Start with some light exercises, such as walking  Avoid heavy lifting until your pain is gone  Ask for more information about the activities or exercises that are right for you  · Apply heat  on your back for 20 to 30 minutes every 2 hours for as many days as directed  Heat helps decrease pain and muscle spasms  Alternate heat and ice  · Apply ice  on your back for 15 to 20 minutes every hour or as directed  Use an ice pack, or put crushed ice in a plastic bag  Cover it with a towel before you apply it to your skin  Ice helps prevent tissue damage and decreases swelling and pain  Prevent acute low back pain:   · Use proper body mechanics  ? Bend at the hips and knees when you  objects  Do not bend from the waist  Use your leg muscles as you lift the load  Do not use your back  Keep the object close to your chest as you lift it  Try not to twist or lift anything above your waist          ? Change your position often when you stand for long periods of time   Rest one foot on a small box or footrest, and then switch to the other foot often  ? Try not to sit for long periods of time  When you do, sit in a straight-backed chair with your feet flat on the floor  Never reach, pull, or push while you are sitting  · Do exercises that strengthen your back muscles  Warm up before you exercise  Ask your healthcare provider the best exercises for you  · Maintain a healthy weight  Ask your healthcare provider what a healthy weight is for you  Ask him or her to help you create a weight loss plan if you are overweight  Follow up with your doctor as directed:  Return for a follow-up visit if you still have pain after 1 to 3 weeks of treatment  You may need to visit an orthopedist if your back pain lasts longer than 12 weeks  Write down your questions so you remember to ask them during your visits  © Copyright [employer] 2022 Information is for End User's use only and may not be sold, redistributed or otherwise used for commercial purposes  All illustrations and images included in CareNotes® are the copyrighted property of [employer]  or Jaime Del Real   The above information is an  only  It is not intended as medical advice for individual conditions or treatments  Talk to your doctor, nurse or pharmacist before following any medical regimen to see if it is safe and effective for you

## 2022-03-28 ENCOUNTER — TELEMEDICINE (OUTPATIENT)
Dept: BEHAVIORAL/MENTAL HEALTH CLINIC | Facility: CLINIC | Age: 22
End: 2022-03-28
Payer: COMMERCIAL

## 2022-03-28 ENCOUNTER — TELEPHONE (OUTPATIENT)
Dept: INTERNAL MEDICINE CLINIC | Facility: CLINIC | Age: 22
End: 2022-03-28

## 2022-03-28 DIAGNOSIS — F32.A DEPRESSION, UNSPECIFIED DEPRESSION TYPE: ICD-10-CM

## 2022-03-28 DIAGNOSIS — F41.1 GENERALIZED ANXIETY DISORDER: Primary | ICD-10-CM

## 2022-03-28 PROCEDURE — 90834 PSYTX W PT 45 MINUTES: CPT | Performed by: COUNSELOR

## 2022-03-28 NOTE — TELEPHONE ENCOUNTER
Patient is requesting the two xray results done 3/25/22    Please have PCP read the xrays and call the patient with the results      Patient was seen by Dr Aron Orozco 3/25/22    thanks

## 2022-03-28 NOTE — PSYCH
Virtual Regular Visit    Verification of patient location:    Patient is located in the following state in which I hold an active license [place]      Assessment/Plan:    Problem List Items Addressed This Visit        Other    Generalized anxiety disorder - Primary    Depression          Goals addressed in session: Goal 1 and Goal 2          Reason for visit is No chief complaint on file  Encounter provider Guillermina Cronin    Provider located at [address] [place] [id number] [phone]      Recent Visits  Date Type Provider Dept   03/21/22 [facility]   Showing recent visits within past 7 days and meeting all other requirements  Future Appointments  No visits were found meeting these conditions  Showing future appointments within next 150 days and meeting all other requirements       The patient was identified by name and date of birth  Kristopher Gregorio was informed that this is a telemedicine visit and that the visit is being conducted throughEpic Embedded and patient was informed this is a secure, HIPAA-complaint platform  She agrees to proceed  My office door was closed  No one else was in the room  She acknowledged consent and understanding of privacy and security of the video platform  The patient has agreed to participate and understands they can discontinue the visit at any time  Patient is aware this is a billable service  Subjective  Kristopher Gregorio is a 25 y o  female     D: Clinician met with [facility] via telehealth for individual therapy  [facility] discussed recent conversatoin with her sister and her feelings about her reaching out for help  [facility] reports feeling relief that her sister is willing to get help and is looking to get into therapy to work on anger issues    Clinician explored ways [facility] to help sister while maintaining boundaries  She reports that she has experienced anger and resentment towards her family due to her upbringing and can relate to her sisters experience and feelings  Clinician explored family dynamic and past trauma  A: [facility] was open and engaged in the session  She reports working on self care and following up with doctors appointments along with healthy coping skills  HPI     Past Medical History:   Diagnosis Date    Anxiety     Depression 2/1/2022    FTND (full term normal delivery) 2001    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 - has EKG done every 2 years    Severe menstrual cramps     Visual impairment     Yeast infection        Past Surgical History:   Procedure Laterality Date    NO PAST SURGERIES         Current Outpatient Medications   Medication Sig Dispense Refill    etonogestrel (NEXPLANON) subdermal implant Inject 68 mg under the skin       No current facility-administered medications for this visit  No Known Allergies    Review of Systems    Video Exam    There were no vitals filed for this visit  Physical Exam     I spent 45 minutes directly with the patient during this visit    VIRTUAL VISIT Keerthi Froylna [facility]  Pt is aware that Virtual Care Services could be limited without vital signs or the ability to perform a full hands-on physical Irena Austin understands she or the provider may request at any time to terminate the video visit and request the patient to seek care or treatment in person

## 2022-04-07 ENCOUNTER — OFFICE VISIT (OUTPATIENT)
Dept: URGENT CARE | Age: 22
End: 2022-04-07
Payer: MEDICARE

## 2022-04-07 VITALS — HEART RATE: 76 BPM | RESPIRATION RATE: 18 BRPM | OXYGEN SATURATION: 98 % | TEMPERATURE: 97.3 F

## 2022-04-07 DIAGNOSIS — J06.9 UPPER RESPIRATORY TRACT INFECTION, UNSPECIFIED TYPE: Primary | ICD-10-CM

## 2022-04-07 PROCEDURE — 87636 SARSCOV2 & INF A&B AMP PRB: CPT | Performed by: NURSE PRACTITIONER

## 2022-04-07 PROCEDURE — 99213 OFFICE O/P EST LOW 20 MIN: CPT | Performed by: NURSE PRACTITIONER

## 2022-04-07 RX ORDER — PREDNISONE 20 MG/1
20 TABLET ORAL 2 TIMES DAILY WITH MEALS
Qty: 10 TABLET | Refills: 0 | Status: SHIPPED | OUTPATIENT
Start: 2022-04-07 | End: 2022-04-12

## 2022-04-07 RX ORDER — BROMPHENIRAMINE MALEATE, PSEUDOEPHEDRINE HYDROCHLORIDE, AND DEXTROMETHORPHAN HYDROBROMIDE 2; 30; 10 MG/5ML; MG/5ML; MG/5ML
5 SYRUP ORAL 4 TIMES DAILY PRN
Qty: 180 ML | Refills: 0 | Status: SHIPPED | OUTPATIENT
Start: 2022-04-07

## 2022-04-07 NOTE — PATIENT INSTRUCTIONS
Upper Respiratory Infection   WHAT YOU NEED TO KNOW:   An upper respiratory infection is also called a cold  It can affect your nose, throat, ears, and sinuses  Cold symptoms are usually worst for the first 3 to 5 days  Most people get better in 7 to 14 days  You may continue to cough for 2 to 3 weeks  Colds are caused by viruses and do not get better with antibiotics  DISCHARGE INSTRUCTIONS:   Call your local emergency number (911 in the [address] [facility]) if:   · You have chest pain or trouble breathing  Return to the emergency department if:   · You have a fever over 102ºF (39ºC)  Call your doctor if:   · You have a low fever  · Your sore throat gets worse or you see white or yellow spots in your throat  · Your symptoms get worse after 3 to 5 days or are not better in 14 days  · You have a rash anywhere on your skin  · You have large, tender lumps in your neck  · You have thick, green, or yellow drainage from your nose  · You cough up thick yellow, green, or bloody mucus  · You have a bad earache  · You have questions or concerns about your condition or care  Medicines: You may need any of the following:  · Decongestants  help reduce nasal congestion and help you breathe more easily  If you take decongestant pills, they may make you feel restless or cause problems with your sleep  Do not use decongestant sprays for more than a few days  · Cough suppressants  help reduce coughing  Ask your healthcare provider which type of cough medicine is best for you  · NSAIDs , such as ibuprofen, help decrease swelling, pain, and fever  NSAIDs can cause stomach bleeding or kidney problems in certain people  If you take blood thinner medicine, always ask your healthcare provider if NSAIDs are safe for you  Always read the medicine label and follow directions  · Acetaminophen  decreases pain and fever  It is available without a doctor's order  Ask how much to take and how often to take it  Follow directions  Read the labels of all other medicines you are using to see if they also contain acetaminophen, or ask your doctor or pharmacist  Acetaminophen can cause liver damage if not taken correctly  Do not use more than 4 grams (4,000 milligrams) total of acetaminophen in one day  · Take your medicine as directed  Contact your healthcare provider if you think your medicine is not helping or if you have side effects  Tell him or her if you are allergic to any medicine  Keep a list of the medicines, vitamins, and herbs you take  Include the amounts, and when and why you take them  Bring the list or the pill bottles to follow-up visits  Carry your medicine list with you in case of an emergency  Self-care:   · Rest as much as possible  Slowly start to do more each day  · Drink more liquids as directed  Liquids will help thin and loosen mucus so you can cough it up  Liquids will also help prevent dehydration  Liquids that help prevent dehydration include water, fruit juice, and broth  Do not drink liquids that contain caffeine  Caffeine can increase your risk for dehydration  Ask your healthcare provider how much liquid to drink each day  · Soothe a sore throat  Gargle with warm salt water  Make salt water by dissolving ¼ teaspoon salt in 1 cup warm water  You may also suck on hard candy or throat lozenges  You may use a sore throat spray  · Use a humidifier or vaporizer  Use a cool mist humidifier or a vaporizer to increase air moisture in your home  This may make it easier for you to breathe and help decrease your cough  · Use saline nasal drops as directed  These help relieve congestion  · Apply petroleum-based jelly around the outside of your nostrils  This can decrease irritation from blowing your nose  · Do not smoke  Nicotine and other chemicals in cigarettes and cigars can make your symptoms worse  They can also cause infections such as bronchitis or pneumonia   Ask your healthcare provider for information if you currently smoke and need help to quit  E-cigarettes or smokeless tobacco still contain nicotine  Talk to your healthcare provider before you use these products  Prevent a cold:   · Wash your hands often  Use soap and water every time you wash your hands  Rub your soapy hands together, lacing your fingers  Use the fingers of one hand to scrub under the nails of the other hand  Wash for at least 20 seconds  Rinse with warm, running water for several seconds  Then dry your hands  Use germ-killing gel if soap and water are not available  Do not touch your eyes or mouth without washing your hands first          · Cover a sneeze or cough  Use a tissue that covers your mouth and nose  Put the used tissue in the trash right away  Use the bend of your arm if a tissue is not available  Wash your hands well with soap and water or use a hand   Do not stand close to anyone who is sneezing or coughing  · Try to stay away from others while you are sick  This is especially important during the first 2 to 3 days when the virus is more easily spread  Wait until a fever, cough, or other symptoms are gone before you return to work or other regular activities  · Do not share items while you are sick  This includes food, drinks, eating utensils, and dishes  Follow up with your doctor as directed:  Write down your questions so you remember to ask them during your visits  © Copyright [employer] 2022 Information is for End User's use only and may not be sold, redistributed or otherwise used for commercial purposes  All illustrations and images included in CareNotes® are the copyrighted property of A D A M [employer] Inc  or Jaime Anders  The above information is an  only  It is not intended as medical advice for individual conditions or treatments   Talk to your doctor, nurse or pharmacist before following any medical regimen to see if it is safe and effective for you

## 2022-04-07 NOTE — PROGRESS NOTES
[facility]        NAME: Rui Valladares is a 25 y o  female  : 2000    MRN: [id number]  DATE: 2022  TIME: 12:53 PM    Assessment and Plan   Upper respiratory tract infection, unspecified type [J06 9]  1  Upper respiratory tract infection, unspecified type  brompheniramine-pseudoephedrine-DM 30-2-10 MG/5ML syrup    Covid19 and INFLUENZA A/B PCR    predniSONE 20 mg tablet         Patient Instructions     Take meds as directed  Covid and flu tested  Follow up with PCP in 3-5 days  Proceed to  ER if symptoms worsen  Chief Complaint     Chief Complaint   Patient presents with    Cough    Headache    Nasal Congestion    Earache         History of Present Illness       HPI   Reports cough, HA, nasal congestion and earache  Duration x 2 days  Sweating mostly at night  Did not checdk temp  Also fatigue  Healthcare worker  Review of Systems   Review of Systems   Constitutional: Positive for fatigue  Negative for chills and fever  HENT: Positive for congestion, rhinorrhea and sneezing  Negative for facial swelling, sore throat and trouble swallowing  Respiratory: Positive for cough and chest tightness (sometimes)  Negative for shortness of breath and wheezing  Cardiovascular: Negative for chest pain  Gastrointestinal: Negative for nausea and vomiting  Neurological: Positive for headaches           Current Medications       Current Outpatient Medications:     brompheniramine-pseudoephedrine-DM 30-2-10 MG/5ML syrup, Take 5 mL by mouth 4 (four) times a day as needed for allergies, Disp: 180 mL, Rfl: 0    etonogestrel (NEXPLANON) subdermal implant, Inject 68 mg under the skin, Disp: , Rfl:     predniSONE 20 mg tablet, Take 1 tablet (20 mg total) by mouth 2 (two) times a day with meals for 5 days, Disp: 10 tablet, Rfl: 0    Current Allergies     Allergies as of 2022    (No Known Allergies)            The following portions of the patient's history were reviewed and updated as appropriate: allergies, current medications, past family history, past medical history, past social history, past surgical history and problem list      Past Medical History:   Diagnosis Date    Anxiety     Depression 2/1/2022    FTND (full term normal delivery) 2001    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 - has EKG done every 2 years    Severe menstrual cramps     Visual impairment     Yeast infection        Past Surgical History:   Procedure Laterality Date    NO PAST SURGERIES         Family History   Problem Relation Age of Onset    No Known Problems Mother     No Known Problems Father     No Known Problems Sister     No Known Problems Brother     No Known Problems Sister     No Known Problems Sister     No Known Problems Brother     No Known Problems Brother     Cancer Family         colon    Breast cancer Paternal Grandmother     Diabetes Paternal Grandmother     Hypertension Maternal Grandmother          Medications have been verified  Objective   Pulse 76   Temp (!) 97 3 °F (36 3 °C) (Temporal)   Resp 18   SpO2 98%   No LMP recorded  Patient has had an implant  Physical Exam     Physical Exam  Constitutional:       Appearance: She is not ill-appearing or diaphoretic  HENT:      Head:      Comments: No TTP of the sinuses     Right Ear: Tympanic membrane and ear canal normal       Left Ear: Tympanic membrane and ear canal normal       Nose: Rhinorrhea present  Comments: turbinates 2+     Mouth/Throat:      Mouth: Mucous membranes are moist       Pharynx: No posterior oropharyngeal erythema  Comments: Post nasal drip  Cardiovascular:      Rate and Rhythm: Regular rhythm  Heart sounds: Normal heart sounds  Pulmonary:      Effort: Pulmonary effort is normal       Breath sounds: Wheezing (left upper chest) present  Lymphadenopathy:      Cervical: No cervical adenopathy

## 2022-04-08 LAB
FLUAV RNA RESP QL NAA+PROBE: NEGATIVE
FLUBV RNA RESP QL NAA+PROBE: NEGATIVE
SARS-COV-2 RNA RESP QL NAA+PROBE: NEGATIVE

## 2022-04-11 ENCOUNTER — TELEMEDICINE (OUTPATIENT)
Dept: BEHAVIORAL/MENTAL HEALTH CLINIC | Facility: CLINIC | Age: 22
End: 2022-04-11
Payer: COMMERCIAL

## 2022-04-11 DIAGNOSIS — F41.1 GENERALIZED ANXIETY DISORDER: Primary | ICD-10-CM

## 2022-04-11 DIAGNOSIS — F32.A DEPRESSION, UNSPECIFIED DEPRESSION TYPE: ICD-10-CM

## 2022-04-11 PROCEDURE — 90834 PSYTX W PT 45 MINUTES: CPT | Performed by: COUNSELOR

## 2022-04-11 NOTE — PSYCH
Virtual Regular Visit    Verification of patient location:    Patient is located in the following state in which I hold an active license [place]      Assessment/Plan:    Problem List Items Addressed This Visit        Other    Generalized anxiety disorder - Primary    Depression          Goals addressed in session: Goal 1 and Goal 2          Reason for visit is No chief complaint on file  Encounter provider Joe Hansen    Provider located at [address] [place] [id number] [phone]      Recent Visits  No visits were found meeting these conditions  Showing recent visits within past 7 days and meeting all other requirements  Future Appointments  No visits were found meeting these conditions  Showing future appointments within next 150 days and meeting all other requirements       The patient was identified by name and date of birth  David Granado was informed that this is a telemedicine visit and that the visit is being conducted throughEpic Embedded and patient was informed this is a secure, HIPAA-complaint platform  She agrees to proceed  My office door was closed  No one else was in the room  She acknowledged consent and understanding of privacy and security of the video platform  The patient has agreed to participate and understands they can discontinue the visit at any time  Patient is aware this is a billable service  Subjective  David Granado is a 25 y o  female     D: Clinician met with [facility] via telehealth for individual therapy  [facility] 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  She reports anxiety related to budget and paying her own bills for her apartment and the upcoming decrease in bills while living on campus  She reports concerns related to time management and anxiety with future schooling, work and social life   Clinician explored and encouraged use of coping skills  [facility] discussed FRAN letter for her pet cat and benefits of having her cat with her and overall management of emotions  A: [facility] was open and engaged in the session and presented with stable mood and affect  P: Continue to meet with [facility] weekly for individual therapy  HPI     Past Medical History:   Diagnosis Date    Anxiety     Depression 2/1/2022    FTND (full term normal delivery) 2001    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 - has EKG done every 2 years    Severe menstrual cramps     Visual impairment     Yeast infection        Past Surgical History:   Procedure Laterality Date    NO PAST SURGERIES         Current Outpatient Medications   Medication Sig Dispense Refill    brompheniramine-pseudoephedrine-DM 30-2-10 MG/5ML syrup Take 5 mL by mouth 4 (four) times a day as needed for allergies 180 mL 0    etonogestrel (NEXPLANON) subdermal implant Inject 68 mg under the skin      predniSONE 20 mg tablet Take 1 tablet (20 mg total) by mouth 2 (two) times a day with meals for 5 days 10 tablet 0     No current facility-administered medications for this visit  No Known Allergies    Review of Systems    Video Exam    There were no vitals filed for this visit  Physical Exam     I spent 45 minutes directly with the patient during this visit    VIRTUAL VISIT Keerthi Froylan Onel verbally agrees to participate in [employer]  Pt is aware that Virtual Care Services could be limited without vital signs or the ability to perform a full hands-on physical Beth Rob understands she or the provider may request at any time to terminate the video visit and request the patient to seek care or treatment in person

## 2022-04-12 ENCOUNTER — APPOINTMENT (OUTPATIENT)
Dept: URGENT CARE | Facility: MEDICAL CENTER | Age: 22
End: 2022-04-12

## 2022-04-12 ENCOUNTER — TRANSCRIBE ORDERS (OUTPATIENT)
Dept: URGENT CARE | Facility: MEDICAL CENTER | Age: 22
End: 2022-04-12

## 2022-04-12 DIAGNOSIS — Z02.1 PHYSICAL EXAM, PRE-EMPLOYMENT: Primary | ICD-10-CM

## 2022-04-12 PROCEDURE — 86480 TB TEST CELL IMMUN MEASURE: CPT | Performed by: PHYSICIAN ASSISTANT

## 2022-04-27 ENCOUNTER — TELEMEDICINE (OUTPATIENT)
Dept: BEHAVIORAL/MENTAL HEALTH CLINIC | Facility: CLINIC | Age: 22
End: 2022-04-27
Payer: COMMERCIAL

## 2022-04-27 DIAGNOSIS — F41.1 GENERALIZED ANXIETY DISORDER: Primary | ICD-10-CM

## 2022-04-27 DIAGNOSIS — F32.A DEPRESSION, UNSPECIFIED DEPRESSION TYPE: ICD-10-CM

## 2022-04-27 PROCEDURE — 90834 PSYTX W PT 45 MINUTES: CPT | Performed by: COUNSELOR

## 2022-04-27 NOTE — PSYCH
Virtual Regular Visit    Verification of patient location:    Patient is located in the following state in which I hold an active license [place]      Assessment/Plan:    Problem List Items Addressed This Visit        Other    Generalized anxiety disorder - Primary    Depression          Goals addressed in session: Goal 1 and Goal 2          Reason for visit is No chief complaint on file  Encounter provider Aleksandr Rivera    Provider located at [address] [place] [id number] [phone]      Recent Visits  Date Type Provider Dept   04/25/22 [facility]   Showing recent visits within past 7 days and meeting all other requirements  Future Appointments  No visits were found meeting these conditions  Showing future appointments within next 150 days and meeting all other requirements       The patient was identified by name and date of birth  Nirmala Duque was informed that this is a telemedicine visit and that the visit is being conducted throughEpic Embedded and patient was informed this is a secure, HIPAA-complaint platform  She agrees to proceed     My office door was closed  No one else was in the room  She acknowledged consent and understanding of privacy and security of the video platform  The patient has agreed to participate and understands they can discontinue the visit at any time  Patient is aware this is a billable service  Subjective  Nirmala Duque is a 25 y o  female     D: Clinician met with [facility] via teleheath for individual therapy  Vision reports increase stress related to moving into her new place on campus over the weekend, working to get all paperwork finalized and beginning classes on Monday  She reports some physical symptoms of stress such as upset stomach  Clinician normalized increased anxiety related to acute stress  She reports attempts to get her place packed in the next few days and that she continues to work full time hours  She did state that she chose not to  a shift for that day due to choosing self care instead  She reports plans to get outside and walk, take a nap and get her hair cut  Clinician praised and reinforced plan for self care and the importance of coping skills  A: [facility] was open and engaged in the session and reports good time management and balance in her schedule with self care, work and other responsibilities  P: Continue to meet with [facility] every other week for individual therapy  HPI     Past Medical History:   Diagnosis Date    Anxiety     Depression 2/1/2022    FTND (full term normal delivery) 2001    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 - has EKG done every 2 years    Severe menstrual cramps     Visual impairment     Yeast infection        Past Surgical History:   Procedure Laterality Date    NO PAST SURGERIES         Current Outpatient Medications   Medication Sig Dispense Refill    brompheniramine-pseudoephedrine-DM 30-2-10 MG/5ML syrup Take 5 mL by mouth 4 (four) times a day as needed for allergies 180 mL 0    etonogestrel (NEXPLANON) subdermal implant Inject 68 mg under the skin       No current facility-administered medications for this visit  No Known Allergies    Review of Systems    Video Exam    There were no vitals filed for this visit  Physical Exam     I spent 45 minutes directly with the patient during this visit    VIRTUAL VISIT Keerthi Sykes verbally agrees to participate in [employer]  Pt is aware that Virtual Care Services could be limited without vital signs or the ability to perform a full hands-on physical Escudero Radha understands she or the provider may request at any time to terminate the video visit and request the patient to seek care or treatment in person

## 2022-05-02 ENCOUNTER — DOCUMENTATION (OUTPATIENT)
Dept: BEHAVIORAL/MENTAL HEALTH CLINIC | Facility: CLINIC | Age: 22
End: 2022-05-02

## 2022-05-04 ENCOUNTER — TELEPHONE (OUTPATIENT)
Dept: INTERNAL MEDICINE CLINIC | Facility: CLINIC | Age: 22
End: 2022-05-04

## 2022-05-04 NOTE — TELEPHONE ENCOUNTER
Patient called to state that she does not want us to contact or speak to [facility] for [employer]    Patient states she left messages 5/3/22

## 2022-05-05 ENCOUNTER — EVALUATION (OUTPATIENT)
Dept: PHYSICAL THERAPY | Facility: REHABILITATION | Age: 22
End: 2022-05-05
Payer: MEDICARE

## 2022-05-05 DIAGNOSIS — M54.41 CHRONIC RIGHT-SIDED LOW BACK PAIN WITH RIGHT-SIDED SCIATICA: ICD-10-CM

## 2022-05-05 DIAGNOSIS — G89.29 CHRONIC RIGHT-SIDED LOW BACK PAIN WITH RIGHT-SIDED SCIATICA: ICD-10-CM

## 2022-05-05 PROCEDURE — 97162 PT EVAL MOD COMPLEX 30 MIN: CPT | Performed by: PHYSICAL THERAPIST

## 2022-05-05 NOTE — PROGRESS NOTES
PT Evaluation     Today's date: 2022  Patient name: Stephanie Murillo  : 2000  MRN: [id number]  Referring provider: Joseph Rivera MD  Dx:   Encounter Diagnosis     ICD-10-CM    1  Chronic right-sided low back pain with right-sided sciatica  M54 41 Ambulatory Referral to Physical Therapy    G89 29                   Assessment  Assessment details: Stephanie Murillo is a pleasant 25 y o  female who presents with chronic low back pain  The patient's greatest concerns are preventing her back from getting worse, getting back into the gym, and figuring out what is wrong  No further referral appears necessary at this time based upon examination results  Primary movement impairment diagnosis of trunk neuromuscular control deficit, LE weakness  Physical exam is consistent with stabilization treatment group for low back pain  Patient demonstrates significant hinging at lower lumbar spine with both flexion and extension, with relative hypomobility of her upper lumbar and thoracic spine  This is suggestive of neuromuscular control deficits that are leading to overload of the lower lumbar spine and contributing to symptoms  Neuro screen is unremarkable  Pt  will benefit from skilled PT services that includes manual therapy techniques to enhance tissue extensibility, neuromuscular re-education to facilitate motor control, therapeutic exercise to increase functional mobility, and modalities prn to reduce pain and inflammation        Primary Impairments:  1) Trunk neuromuscular control deficit   2) LE weakness      Impairments: abnormal muscle firing, abnormal or restricted ROM, abnormal movement, activity intolerance, impaired physical strength, lacks appropriate home exercise program, pain with function, poor posture  and poor body mechanics    Symptom irritability: moderateUnderstanding of Dx/Px/POC: good   Prognosis: good  Prognosis details: Positive prognostic indicators include positive attitude toward recovery  Goals  STG  Patient will be independent with home exercise program in 1-2 visits  Patient will return to independent gym routine with modification in 2 weeks  Patient will report 50% improvement in symptoms in 4 weeks  LTG  Patient will be independent in comprehensive HEP upon discharge  Patient will achieve goal FOTO score upon discharge  Patient will return to independent gym routine without modification upon discharge  Patient will demonstrate >4/5 hip extension MMT upon discharge  Plan  Patient would benefit from: skilled physical therapy  Planned therapy interventions: activity modification, joint mobilization, manual therapy, motor coordination training, neuromuscular re-education, patient education, self care, therapeutic activities, therapeutic exercise, graded activity, home exercise program, behavior modification, graded exercise, functional ROM exercises and strengthening  Frequency: 2x week  Duration in weeks: 6  Treatment plan discussed with: patient        Subjective Evaluation    History of Present Illness  Mechanism of injury: Patient reports a chronic hx of low back pain with radicular symptoms  She has not been experiencing much pain in the past 2-3 weeks as she has been very busy and relatively inactive  She has a hx of R sided radicular symptoms that are aggravated by heavy activity and exercise  Recently she had a flare up of symptoms on her LLE  Her initial flare up was after approx 2 weeks of hiking and activities while on vacation at the beach and in [place]  She woke up in the middle of the night and was unable to walk due to the severity of the pain  Since that point she reports "lingering" episodes of pain  She is a student in the school and is still able to do daily activities, work full shifts, and go to the gym but is occasionally limited by the pain     Pain  Current pain ratin  At best pain ratin  At worst pain rating: 10  Quality: radiating and sharp  Aggravating factors: walking (being up on her feet)      Diagnostic Tests  X-ray: normal  Patient Goals  Patient goals for therapy: increased strength, independence with ADLs/IADLs, increased motion and decreased pain          Objective     General Comments:      Lumbar Comments  Standing Posture: WFL    Red Flags: unremarkable     Lower Quarter Screen: unremarkable     Manual Muscle Testing:   Hip Extension:  R  3+/5  L  3+/5  B/L lumbar spine rotation     Palpation Assessment: severe TTP L4/5 SP    Range of Motion:   Lumbar Spine Flexion: WFL  Lumbar Spine Extension: WFL; limited thoracic spine extension/lower lumbar hinging  Lumbar Spine Sidebending: WFL B/L    Special Tests   Hip Scour: neg  FRANCOIS: neg  Prone Instability Test: neg  Passive SLR: neg    Repeated Movements:     RFIS:  Increase; worse    SANTIAGO: increase; worse                 Precautions: none      Manuals 5/5                                                                Neuro Re-Ed             Bridge w/ march 10x ea HEP            Bird dog 10x ea HEP            Side plank             Unilateral row             Palloff press                          Education HEP and POC            Ther Ex             TM walk             Open books 20x ea HEP                                                                                          Ther Activity             Squat             Deadlift             Suitcase carry             Lunge             Out front carry                          Gait Training                                       Modalities

## 2022-05-05 NOTE — TELEPHONE ENCOUNTER
On 5/3/22 patient came into the office stating she is going into the [facility]  She said she has a support cat that will be living in the dorm with her and she was told she needed a letter from her therapist/psychistrist saying why she needs it  She got the letter and provided it to the supervisor Adams Esteban and was then told she needed to have it signed off on by her PCP but she doesn't really know why  I advised her we cannot sign off on that letter but I can speak with her PCP to see if another one can be written in addition to the on she has  She asked that I call Adams Esteban and see exactly what it is that she needs as she was told to get a letter and she did so she doesn't understand why she has to get another one  I called and left a message for Phyllis at the number patient provided ([phone]) and had to leave a message  Phyllis called back later but I was not available to speak to her  Patient then called yesterday 5/4/22 and left the below message that she does not want us to speak to Adams Esteban  I called the patient to confirm and she said that she went above Phyllis and had the issue resolved so she no longer needs the letter from us   I scanned in a copy of the letter she received from her therapist/psychiatrist

## 2022-05-09 ENCOUNTER — OFFICE VISIT (OUTPATIENT)
Dept: PHYSICAL THERAPY | Facility: REHABILITATION | Age: 22
End: 2022-05-09
Payer: MEDICARE

## 2022-05-09 DIAGNOSIS — G89.29 CHRONIC RIGHT-SIDED LOW BACK PAIN WITH RIGHT-SIDED SCIATICA: Primary | ICD-10-CM

## 2022-05-09 DIAGNOSIS — M54.41 CHRONIC RIGHT-SIDED LOW BACK PAIN WITH RIGHT-SIDED SCIATICA: Primary | ICD-10-CM

## 2022-05-09 PROCEDURE — 97112 NEUROMUSCULAR REEDUCATION: CPT

## 2022-05-09 PROCEDURE — 97530 THERAPEUTIC ACTIVITIES: CPT

## 2022-05-09 PROCEDURE — 97110 THERAPEUTIC EXERCISES: CPT

## 2022-05-09 NOTE — PROGRESS NOTES
Daily Note     Today's date: 2022  Patient name: Carey De Souza  : 2000  MRN: [id number]  Referring provider: Robb Anne MD  Dx:   Encounter Diagnosis     ICD-10-CM    1  Chronic right-sided low back pain with right-sided sciatica  M54 41     G89 29                   Subjective: Pt reports no changes overall, states that pain presents throughout the day, no specific motions bring it on      Objective: See treatment diary below      Assessment: Tolerated treatment well  Patient would benefit from continued PT   Pt  able to complete all exercises with no increase in pain during or after session  Pt did well with first treat, demonstrated good form with exercises and required minimal cuing  Pt  1:1 with PTA for entirety  Plan: Continue per plan of care        Precautions: none      Manuals                                                                Neuro Re-Ed             Bridge / march 10x ea HEP 2x10 ea            Bird dog 10x ea HEP            Side plank             Unilateral row  2x10 ea btb           Palloff press  15x ea b/l btb                        Education HEP and POC            Ther Ex             TM walk  10'           Open books 20x ea HEP 20x ea                                                                                         Ther Activity             Squat  2x10           Deadlift  2x10 15#           Suitcase carry  3x30" 15# b/l           Lunge  2x10 ea b/l           Out front carry                          Gait Training                                       Modalities

## 2022-05-12 ENCOUNTER — TELEMEDICINE (OUTPATIENT)
Dept: BEHAVIORAL/MENTAL HEALTH CLINIC | Facility: CLINIC | Age: 22
End: 2022-05-12
Payer: COMMERCIAL

## 2022-05-12 ENCOUNTER — OFFICE VISIT (OUTPATIENT)
Dept: PHYSICAL THERAPY | Facility: REHABILITATION | Age: 22
End: 2022-05-12
Payer: MEDICARE

## 2022-05-12 DIAGNOSIS — G89.29 CHRONIC RIGHT-SIDED LOW BACK PAIN WITH RIGHT-SIDED SCIATICA: Primary | ICD-10-CM

## 2022-05-12 DIAGNOSIS — F41.1 GENERALIZED ANXIETY DISORDER: Primary | ICD-10-CM

## 2022-05-12 DIAGNOSIS — F32.A DEPRESSION, UNSPECIFIED DEPRESSION TYPE: ICD-10-CM

## 2022-05-12 DIAGNOSIS — M54.41 CHRONIC RIGHT-SIDED LOW BACK PAIN WITH RIGHT-SIDED SCIATICA: Primary | ICD-10-CM

## 2022-05-12 PROCEDURE — 97530 THERAPEUTIC ACTIVITIES: CPT | Performed by: PHYSICAL THERAPIST

## 2022-05-12 PROCEDURE — 90834 PSYTX W PT 45 MINUTES: CPT | Performed by: COUNSELOR

## 2022-05-12 PROCEDURE — 97110 THERAPEUTIC EXERCISES: CPT | Performed by: PHYSICAL THERAPIST

## 2022-05-12 PROCEDURE — 97112 NEUROMUSCULAR REEDUCATION: CPT | Performed by: PHYSICAL THERAPIST

## 2022-05-12 NOTE — PROGRESS NOTES
Daily Note     Today's date: 2022  Patient name: Mindy Ohara  : 2000  MRN: [id number]  Referring provider: Rodrigo Cai MD  Dx:   Encounter Diagnosis     ICD-10-CM    1  Chronic right-sided low back pain with right-sided sciatica  M54 41     G89 29                   Subjective: Patient reports muscular soreness and fatigue after last tx  She was able to go to the gym at the end of last week and says that it went well  She feels like her back has been doing "pretty good" the past few days  Objective: See treatment diary below      Assessment: Patient tolerates progression in tx well  She reports muscular fatigue without reproduction of back pain throughout tx  Continue to progress as tolerated  Plan: Continue per plan of care        Precautions: none      Manuals                                                               Neuro Re-Ed             Bridge / march 10x ea HEP 2x10 ea  10x ea          Bird dog 10x ea HEP            Side plank   2x5 ea; 5s          Unilateral row  2x10 ea btb 2x15 ea; 14#           Palloff press  15x ea b/l btb 2x15 ea; 12#                       Education HEP and POC            Ther Ex             TM walk  10' 10 min          Open books 20x ea HEP 20x ea                                                                                         Ther Activity             Squat  2x10           Deadlift  2x10 15# 4x8; 44#          Suitcase carry  3x30" 15# b/l  25# nv         Lunge  2x10 ea b/l 4x5 ea; 15#          Out front carry                          Gait Training                                       Modalities

## 2022-05-12 NOTE — PSYCH
Virtual Regular Visit    Verification of patient location:    Patient is located in the following state in which I hold an active license [place]      Assessment/Plan:    Problem List Items Addressed This Visit        Other    Generalized anxiety disorder - Primary    Depression          Goals addressed in session: Goal 1 and Goal 2          Reason for visit is No chief complaint on file  Encounter provider Shilpi Rodriguez    Provider located at [address] [place], [place] [address]  [facility] [id number]  [phone]      Recent Visits  No visits were found meeting these conditions  Showing recent visits within past 7 days and meeting all other requirements  Future Appointments  No visits were found meeting these conditions  Showing future appointments within next 150 days and meeting all other requirements       The patient was identified by name and date of birth  Yoly Escudero was informed that this is a telemedicine visit and that the visit is being conducted throughEpic Embedded and patient was informed this is a secure, HIPAA-complaint platform  She agrees to proceed     My office door was closed  No one else was in the room  She acknowledged consent and understanding of privacy and security of the video platform  The patient has agreed to participate and understands they can discontinue the visit at any time  Patient is aware this is a billable service  Subjective  Yoly Escudero is a 25 y o  female     D:Clinician met with [facility] via telehealth for individual therapy  [facility] processed 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  She reports that she had a stressful two weeks as she moved in and began school  She reports she has begun to meet other students and is feeling like she is beginning to get adjusted  Clinician explored use of coping skills and self care and time management skills    She reports engaging in tutoring services to catch up with class work which clinician praised and reinforced  She discussed working to get into schedule and finding time to work out and work  A: [facility] was open and engaged in the session  She presented with stable mood and affect  P: Continue to meet with [facility] every other week for individual therapy  HPI     Past Medical History:   Diagnosis Date    Anxiety     Depression 2/1/2022    FTND (full term normal delivery) 2001    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 4 or 4 - has EKG done every 2 years    Severe menstrual cramps     Visual impairment     Yeast infection        Past Surgical History:   Procedure Laterality Date    NO PAST SURGERIES         Current Outpatient Medications   Medication Sig Dispense Refill    brompheniramine-pseudoephedrine-DM 30-2-10 MG/5ML syrup Take 5 mL by mouth 4 (four) times a day as needed for allergies 180 mL 0    etonogestrel (NEXPLANON) subdermal implant Inject 68 mg under the skin       No current facility-administered medications for this visit  No Known Allergies    Review of Systems    Video Exam    There were no vitals filed for this visit  Physical Exam     I spent 45 minutes directly with the patient during this visit    VIRTUAL VISIT Keerthi Sykes verbally agrees to participate in [employer]  Pt is aware that Virtual Care Services could be limited without vital signs or the ability to perform a full hands-on physical Lalitha Brumfield understands she or the provider may request at any time to terminate the video visit and request the patient to seek care or treatment in person

## 2022-05-18 ENCOUNTER — OFFICE VISIT (OUTPATIENT)
Dept: PHYSICAL THERAPY | Facility: REHABILITATION | Age: 22
End: 2022-05-18
Payer: MEDICARE

## 2022-05-18 DIAGNOSIS — G89.29 CHRONIC RIGHT-SIDED LOW BACK PAIN WITH RIGHT-SIDED SCIATICA: Primary | ICD-10-CM

## 2022-05-18 DIAGNOSIS — M54.41 CHRONIC RIGHT-SIDED LOW BACK PAIN WITH RIGHT-SIDED SCIATICA: Primary | ICD-10-CM

## 2022-05-18 PROCEDURE — 97110 THERAPEUTIC EXERCISES: CPT | Performed by: PHYSICAL THERAPIST

## 2022-05-18 PROCEDURE — 97112 NEUROMUSCULAR REEDUCATION: CPT | Performed by: PHYSICAL THERAPIST

## 2022-05-18 NOTE — PROGRESS NOTES
Daily Note     Today's date: 2022  Patient name: Gaston Herring  : 2000  MRN: [id number]  Referring provider: Louie Tamayo MD  Dx:   Encounter Diagnosis     ICD-10-CM    1  Chronic right-sided low back pain with right-sided sciatica  M54 41     G89 29                   Subjective: Patient reports a flare up of her symptoms after sitting for hours while studying  She tried to go to the gym this morning but was unable to workout because of sharp localized low back pain  Objective: See treatment diary below      Assessment: Patient's presentation is similar to that on initial evaluation, but her symptom irritability level is much higher today  She does not have any consistent change with lumbar repeated movements  Her symptoms were slightly improved with lumbopelvic stability interventions  She demonstrates very poor glute activation in both prone and while attempting to perform a bridge  Patient notes an overall improvement in symptoms at the end of tx  Discussed with patient recommendation to perform light lumbopelvic stability exercises to improve muscle function and address glute inhibition  Attempt to reintroduce interventions at prior level if appropriate next visit  Plan: Continue per plan of care        Precautions: none      Manuals                                          Neuro Re-Ed         Bridge / march 10x ea HEP 2x10 ea  10x ea      Bird dog 10x ea HEP        Side plank   2x5 ea; 5s      Unilateral row  2x10 ea btb 2x15 ea; 14#  2x10 ea; 8#     Palloff press  15x ea b/l btb 2x15 ea; 12# 2x10 ea; 8#     Sidelying clamshell    15x ea     Standing multifidus activation    2x20 ea     Standing hip ext    2x10 ea                                Education HEP and POC        Ther Ex         TM walk  10' 10 min 5 min     Open books 20x ea HEP 20x ea       LTR    15x ea                                                  Ther Activity         Squat  2x10 Deadlift  2x10 15# 4x8; 44# held     Suitcase carry  3x30" 15# b/l       Lunge  2x10 ea b/l 4x5 ea; 15# held     Out front carry                  Gait Training                           Modalities

## 2022-05-20 ENCOUNTER — TELEMEDICINE (OUTPATIENT)
Dept: BEHAVIORAL/MENTAL HEALTH CLINIC | Facility: CLINIC | Age: 22
End: 2022-05-20
Payer: COMMERCIAL

## 2022-05-20 DIAGNOSIS — F32.A DEPRESSION, UNSPECIFIED DEPRESSION TYPE: ICD-10-CM

## 2022-05-20 DIAGNOSIS — F41.1 GENERALIZED ANXIETY DISORDER: Primary | ICD-10-CM

## 2022-05-20 PROCEDURE — 90834 PSYTX W PT 45 MINUTES: CPT | Performed by: COUNSELOR

## 2022-05-20 NOTE — PSYCH
Virtual Regular Visit    Verification of patient location:    Patient is located in the following state in which I hold an active license [place]      Assessment/Plan:    Problem List Items Addressed This Visit        Other    Generalized anxiety disorder - Primary    Depression          Goals addressed in session: Goal 1 and Goal 2          Reason for visit is No chief complaint on file  Encounter provider Selma Lisa    Provider located at [address] [place] [id number]  [phone]      Recent Visits  No visits were found meeting these conditions  Showing recent visits within past 7 days and meeting all other requirements  Future Appointments  No visits were found meeting these conditions  Showing future appointments within next 150 days and meeting all other requirements       The patient was identified by name and date of birth  Pinky Almaraz was informed that this is a telemedicine visit and that the visit is being conducted through[employer] Embedded and patient was informed this is a secure, HIPAA-complaint platform  She agrees to proceed     My office door was closed  No one else was in the room  She acknowledged consent and understanding of privacy and security of the video platform  The patient has agreed to participate and understands they can discontinue the visit at any time  Patient is aware this is a billable service  Subjective  Pinky Almaraz is a 25 y o  female     D: Clinician met with [facility] via telehealth for individual therapy  Vision discussed her schooling and frustration with one particular teacher that she is struggling to learn the presented information  Clinician discussed possible study strategies and reaching out for help from other students  Vision processed her strategies for time management skills and balancing her schedule with school work and personal life  Clinician praised and reinforced working towards balance and incorporating self care  Vision discussed injured her back recently and is struggling with pain management  A: Vision was open and engaged in the session  She reports stable mood and affect overall and working on managing her mood and 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  P: Continue to meet with [facility] weekly for individual therapy  HPI     Past Medical History:   Diagnosis Date    Anxiety     Depression 2/1/2022    FTND (full term normal delivery) 2001    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 4 or 4 - has EKG done every 2 years    Severe menstrual cramps     Visual impairment     Yeast infection        Past Surgical History:   Procedure Laterality Date    NO PAST SURGERIES         Current Outpatient Medications   Medication Sig Dispense Refill    brompheniramine-pseudoephedrine-DM 30-2-10 MG/5ML syrup Take 5 mL by mouth 4 (four) times a day as needed for allergies 180 mL 0    etonogestrel (NEXPLANON) subdermal implant Inject 68 mg under the skin       No current facility-administered medications for this visit  No Known Allergies    Review of Systems    Video Exam    There were no vitals filed for this visit  Physical Exam     I spent 40 minutes directly with the patient during this visit    VIRTUAL VISIT Keerthi Sykes verbally agrees to participate in [employer]  Pt is aware that Virtual Care Services could be limited without vital signs or the ability to perform a full hands-on physical Dewayne Sierra understands she or the provider may request at any time to terminate the video visit and request the patient to seek care or treatment in person

## 2022-05-26 ENCOUNTER — TELEMEDICINE (OUTPATIENT)
Dept: BEHAVIORAL/MENTAL HEALTH CLINIC | Facility: CLINIC | Age: 22
End: 2022-05-26
Payer: COMMERCIAL

## 2022-05-26 ENCOUNTER — OFFICE VISIT (OUTPATIENT)
Dept: PHYSICAL THERAPY | Facility: REHABILITATION | Age: 22
End: 2022-05-26
Payer: MEDICARE

## 2022-05-26 DIAGNOSIS — M54.41 CHRONIC RIGHT-SIDED LOW BACK PAIN WITH RIGHT-SIDED SCIATICA: Primary | ICD-10-CM

## 2022-05-26 DIAGNOSIS — F32.A DEPRESSION, UNSPECIFIED DEPRESSION TYPE: ICD-10-CM

## 2022-05-26 DIAGNOSIS — F41.1 GENERALIZED ANXIETY DISORDER: Primary | ICD-10-CM

## 2022-05-26 DIAGNOSIS — G89.29 CHRONIC RIGHT-SIDED LOW BACK PAIN WITH RIGHT-SIDED SCIATICA: Primary | ICD-10-CM

## 2022-05-26 PROCEDURE — 90834 PSYTX W PT 45 MINUTES: CPT | Performed by: COUNSELOR

## 2022-05-26 PROCEDURE — 97112 NEUROMUSCULAR REEDUCATION: CPT | Performed by: PHYSICAL THERAPIST

## 2022-05-26 PROCEDURE — 97530 THERAPEUTIC ACTIVITIES: CPT | Performed by: PHYSICAL THERAPIST

## 2022-05-26 NOTE — PROGRESS NOTES
Daily Note     Today's date: 2022  Patient name: Alexa Brito  : 2000  MRN: [id number]  Referring provider: Sayra Lopez MD  Dx:   Encounter Diagnosis     ICD-10-CM    1  Chronic right-sided low back pain with right-sided sciatica  M54 41     G89 29                   Subjective: Patient reports an improvement in symptoms since last visit  She is continuing to have heightened pain above her typical baseline  She reports no radicular symptoms, but does have constant low back pain  Objective: See treatment diary below      Assessment: Patient reports resolution of back pain after performing prone superman and hollow hold exercises  Patient is able to tolerate lumbopelvic stability interventions without increase in symptoms  Discussion about implementing core "activation" exercises before a work shift or sitting for a long time to prevent onset f symptoms  Continue to progress as tolerated  Plan: Continue per plan of care        Precautions: none      Manuals                                         Neuro Re-Ed         Bridge  10x ea HEP 2x10 ea  10x ea      Bird dog 10x ea HEP        Side plank   2x5 ea; 5s      Unilateral row  2x10 ea btb 2x15 ea; 14#  2x10 ea; 8# 2x15 ea; 14#    Palloff press  15x ea b/l btb 2x15 ea; 12# 2x10 ea; 8# 2x15 ea; 12#    Sidelying clamshell    15x ea     Standing multifidus activation    2x20 ea     Standing hip ext    2x10 ea     Prone alt superman     10x 3s ea; HEP    Hollow hold rocks     4x5; HEP                      Education HEP and POC        Ther Ex         TM walk  10' 10 min 5 min 5 min    Open books 20x ea HEP 20x ea       LTR    15x ea                                                  Ther Activity         Squat  2x10       Deadlift  2x10 15# 4x8; 44# held     Suitcase carry  3x30" 15# b/l   3 laps ea; 25#    Lunge  2x10 ea b/l 4x5 ea; 15# held     Out front carry     3 laps; 15#             Gait Training Modalities

## 2022-05-26 NOTE — PSYCH
Virtual Regular Visit    Verification of patient location:    Patient is located in the following state in which I hold an active license [place]      Assessment/Plan:    Problem List Items Addressed This Visit        Other    Generalized anxiety disorder - Primary    Depression          Goals addressed in session: Goal 1 and Goal 2          Reason for visit is No chief complaint on file  Encounter provider Ike Villarreal    Provider located at [address] [place] [id number] [phone]      Recent Visits  Date Type Provider Dept   05/20/22 [facility]   Showing recent visits within past 7 days and meeting all other requirements  Future Appointments  No visits were found meeting these conditions  Showing future appointments within next 150 days and meeting all other requirements       The patient was identified by name and date of birth  Darwin Kearns was informed that this is a telemedicine visit and that the visit is being conducted through[employer]pic Embedded and patient was informed this is a secure, HIPAA-complaint platform  She agrees to proceed     My office door was closed  No one else was in the room  She acknowledged consent and understanding of privacy and security of the video platform  The patient has agreed to participate and understands they can discontinue the visit at any time  Patient is aware this is a billable service  Subjective  Darwin Kearns is a 25 y o  female     D: Clinician met with [facility] via telehealth for individual therapy  Vision discussed getting more adjusted to schooling and schedule  Clinician explored strategies that have helped to make her feel more comfortable and increasing social support within her classes and programs  Clinician validated and praised coping skills and good time management   Vision reports anxiety related to upcoming weekend of work and requirement of maintaining certain number of hours per week of employment to maintain program requirements  Clinician explored other possible options and working to figure out what works best for her  A: Vision was open and engaged in the session and reports decrease in anxiety about schooling  She presented with stable mood and affect  P: Continue to meet with [facility] every other week for individual therapy  HPI     Past Medical History:   Diagnosis Date    Anxiety     Depression 2/1/2022    FTND (full term normal delivery) 2001    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[facility])     around age 4 or 4 - has EKG done every 2 years    Severe menstrual cramps     Visual impairment     Yeast infection        Past Surgical History:   Procedure Laterality Date    NO PAST SURGERIES         Current Outpatient Medications   Medication Sig Dispense Refill    brompheniramine-pseudoephedrine-DM 30-2-10 MG/5ML syrup Take 5 mL by mouth 4 (four) times a day as needed for allergies 180 mL 0    etonogestrel (NEXPLANON) subdermal implant Inject 68 mg under the skin       No current facility-administered medications for this visit  No Known Allergies    Review of Systems    Video Exam    There were no vitals filed for this visit  Physical Exam     I spent 45 minutes directly with the patient during this visit    VIRTUAL VISIT Sebastianloan Sykes verbally agrees to participate in [employer]  Pt is aware that Virtual Care Services could be limited without vital signs or the ability to perform a full hands-on physical Natalia Tobyis understands she or the provider may request at any time to terminate the video visit and request the patient to seek care or treatment in person

## 2022-05-27 ENCOUNTER — TELEPHONE (OUTPATIENT)
Dept: INTERNAL MEDICINE CLINIC | Facility: CLINIC | Age: 22
End: 2022-05-27

## 2022-06-02 ENCOUNTER — OFFICE VISIT (OUTPATIENT)
Dept: PHYSICAL THERAPY | Facility: REHABILITATION | Age: 22
End: 2022-06-02
Payer: MEDICARE

## 2022-06-02 DIAGNOSIS — G89.29 CHRONIC RIGHT-SIDED LOW BACK PAIN WITH RIGHT-SIDED SCIATICA: Primary | ICD-10-CM

## 2022-06-02 DIAGNOSIS — M54.41 CHRONIC RIGHT-SIDED LOW BACK PAIN WITH RIGHT-SIDED SCIATICA: Primary | ICD-10-CM

## 2022-06-02 PROCEDURE — 97110 THERAPEUTIC EXERCISES: CPT | Performed by: PHYSICAL THERAPIST

## 2022-06-02 PROCEDURE — 97112 NEUROMUSCULAR REEDUCATION: CPT | Performed by: PHYSICAL THERAPIST

## 2022-06-02 NOTE — PROGRESS NOTES
Daily Note     Today's date: 2022  Patient name: Baudilio Serrato  : 2000  MRN: [id number]  Referring provider: Lucyann Cogan, MD  Dx:   Encounter Diagnosis     ICD-10-CM    1  Chronic right-sided low back pain with right-sided sciatica  M54 41     G89 29                   Subjective: Patient reports good tolerance to last tx  She has had minimal back pain since last visit  She reports good tolerance to PT overall  Objective: See treatment diary below      Assessment: Patient responded well to treatment and was able to increase the load for unilateral rows  Pt showed some weakness with paloff press and serratus pulll throughs but was able to work through them with some cueing  Cueing was also needed for unilateral rows and the deadlift and press in order to prevent upper trap compensation  Patient reports no increase in symptoms at the end of tx  Patient will follow up in 1-2 weeks to determine ability to self manage  Plan: Continue per plan of care        Precautions: none      Manuals                                        Neuro Re-Ed         Lissett Anders  10x ea HEP 2x10 ea  10x ea      Bird dog 10x ea HEP        Side plank   2x5 ea; 5s      Unilateral row  2x10 ea btb 2x15 ea; 14#  2x10 ea; 8# 2x15 ea; 14# 3x10 ea; 16#   Palloff press  15x ea b/l btb 2x15 ea; 12# 2x10 ea; 8# 2x15 ea; 12# 2x12 ea; 12#   Unilateral press       3x10 ea; 16#         3x5 ea; 20#   Serratus pull through      3x5 ea   Sidelying clamshell    15x ea     Standing multifidus activation    2x20 ea     Standing hip ext    2x10 ea     Prone alt superman     10x 3s ea; HEP    Hollow hold rocks     4x5; HEP                      Education HEP and POC        Ther Ex         TM walk  10' 10 min 5 min 5 min 5 min   Open books 20x ea HEP 20x ea       LTR    15x ea     Unilateral KB clean and press      3x5 ea; 20#                                       Ther Activity         Squat  2x10 Deadlift  2x10 15# 4x8; 44# held     Suitcase carry  3x30" 15# b/l   3 laps ea; 25#    Lunge  2x10 ea b/l 4x5 ea; 15# held     Out front carry     3 laps; 15#             Gait Training                           Modalities

## 2022-06-09 ENCOUNTER — OFFICE VISIT (OUTPATIENT)
Dept: PHYSICAL THERAPY | Facility: REHABILITATION | Age: 22
End: 2022-06-09
Payer: MEDICARE

## 2022-06-09 DIAGNOSIS — M54.41 CHRONIC RIGHT-SIDED LOW BACK PAIN WITH RIGHT-SIDED SCIATICA: Primary | ICD-10-CM

## 2022-06-09 DIAGNOSIS — G89.29 CHRONIC RIGHT-SIDED LOW BACK PAIN WITH RIGHT-SIDED SCIATICA: Primary | ICD-10-CM

## 2022-06-09 PROCEDURE — 97112 NEUROMUSCULAR REEDUCATION: CPT | Performed by: PHYSICAL THERAPIST

## 2022-06-09 PROCEDURE — 97530 THERAPEUTIC ACTIVITIES: CPT | Performed by: PHYSICAL THERAPIST

## 2022-06-09 PROCEDURE — 97110 THERAPEUTIC EXERCISES: CPT | Performed by: PHYSICAL THERAPIST

## 2022-06-09 NOTE — PROGRESS NOTES
PT Discharge    Today's date: 2022  Patient name: Juanita Silva  : 2000  MRN: [id number]  Referring provider: Vicky Kinney MD  Dx:   Encounter Diagnosis     ICD-10-CM    1  Chronic right-sided low back pain with right-sided sciatica  M54 41     G89 29                   Subjective: Patient reports good tolerance to last tx and that she is "feeling good"  She has had minimal back pain since last visit  She reports good tolerance to PT overall  She feels that she is able to continue to progress with return to gym routine independently  Objective: See treatment diary below     Lumbar Range of Motion: WFL    Assessment: Patient responded well to treatment and was able to increase the load for rows, press, paloff press, clean and press and serratus pull throughs  Patient is fatigued but properly challenged  Juanita Silva has been compliant with attending PT and home exercise program since initial eval   Vision  has made improvements in objective data since initial evalulation and has achieved all goals  Patient reports having returned to their prior level or function  Patient provided with updated Home Exercise Program, all questions answered, verbalized understanding and agreement to plan of care  Thus it was mutually decided to discontinue this episode of care and transition to Home Exercise Program        Goals  STG  Patient will be independent with home exercise program in 1-2 visits  Complete  Patient will return to independent gym routine with modification in 2 weeks  Complete  Patient will report 50% improvement in symptoms in 4 weeks  Complete    LTG  Patient will be independent in comprehensive HEP upon discharge  Complete  Patient will achieve goal FOTO score upon discharge  Complete  Patient will return to independent gym routine without modification upon discharge  Complete  Patient will demonstrate >4/5 hip extension MMT upon discharge  Not tested    Plan: D/C to HEP  Precautions: none      Manuals 5/5 5/9 5/12 5/18 5/26 6/2 6/9                                           Neuro Re-Ed          Faustino King w/ march 10x ea HEP 2x10 ea  10x ea       Bird dog 10x ea HEP         Side plank   2x5 ea; 5s       Unilateral row  2x10 ea btb 2x15 ea; 14#  2x10 ea; 8# 2x15 ea; 14# 3x10 ea; 16# 3x12 ea; 16#   Palloff press  15x ea b/l btb 2x15 ea; 12# 2x10 ea; 8# 2x15 ea; 12# 2x12 ea; 12# 3x10 ea; 12#   Unilateral press       3x10 ea; 16# 3x12 ea; 16#             Serratus pull through      3x5 ea;20# 3x8 ea 20#   Sidelying clamshell    15x ea      Standing multifidus activation    2x20 ea      Standing hip ext    2x10 ea      Prone alt superman     10x 3s ea; HEP     Hollow hold rocks     4x5; HEP                         Education HEP and POC         Ther Ex          TM walk  10' 10 min 5 min 5 min 5 min 5 min   Open books 20x ea HEP 20x ea        LTR    15x ea      Unilateral KB clean and press      3x5 ea; 20# 3x8 ea; 20#                                           Ther Activity          Squat  2x10        Deadlift  2x10 15# 4x8; 44# held      Suitcase carry  3x30" 15# b/l   3 laps ea; 25#     Lunge  2x10 ea b/l 4x5 ea; 15# held      Out front carry     3 laps; 15#               Gait Training                              Modalities

## 2022-06-10 ENCOUNTER — HOSPITAL ENCOUNTER (EMERGENCY)
Facility: HOSPITAL | Age: 22
Discharge: HOME | End: 2022-06-10
Attending: EMERGENCY MEDICINE
Payer: MEDICARE

## 2022-06-10 VITALS
BODY MASS INDEX: 35.87 KG/M2 | WEIGHT: 190 LBS | SYSTOLIC BLOOD PRESSURE: 153 MMHG | DIASTOLIC BLOOD PRESSURE: 71 MMHG | HEIGHT: 61 IN | RESPIRATION RATE: 18 BRPM | HEART RATE: 94 BPM | OXYGEN SATURATION: 98 % | TEMPERATURE: 98.3 F

## 2022-06-10 DIAGNOSIS — R42 LIGHTHEADEDNESS: Primary | ICD-10-CM

## 2022-06-10 DIAGNOSIS — R00.2 PALPITATIONS: ICD-10-CM

## 2022-06-10 DIAGNOSIS — R53.83 FATIGUE: ICD-10-CM

## 2022-06-10 DIAGNOSIS — R51.9 HEADACHE: ICD-10-CM

## 2022-06-10 LAB
ALBUMIN SERPL BCP-MCNC: 4 G/DL (ref 3.5–5)
ALP SERPL-CCNC: 128 U/L (ref 46–116)
ALT SERPL W P-5'-P-CCNC: 63 U/L (ref 12–78)
ANION GAP SERPL CALCULATED.3IONS-SCNC: 8 MMOL/L (ref 4–13)
AST SERPL W P-5'-P-CCNC: 24 U/L (ref 5–45)
BASOPHILS # BLD AUTO: 0.04 THOUSANDS/ΜL (ref 0–0.1)
BASOPHILS NFR BLD AUTO: 0 % (ref 0–1)
BILIRUB SERPL-MCNC: 0.38 MG/DL (ref 0.2–1)
BUN SERPL-MCNC: 19 MG/DL (ref 5–25)
CALCIUM SERPL-MCNC: 9.6 MG/DL (ref 8.3–10.1)
CHLORIDE SERPL-SCNC: 111 MMOL/L (ref 100–108)
CO2 SERPL-SCNC: 21 MMOL/L (ref 21–32)
CREAT SERPL-MCNC: 0.75 MG/DL (ref 0.6–1.3)
EOSINOPHIL # BLD AUTO: 0.06 THOUSAND/ΜL (ref 0–0.61)
EOSINOPHIL NFR BLD AUTO: 0 % (ref 0–6)
ERYTHROCYTE [DISTWIDTH] IN BLOOD BY AUTOMATED COUNT: 13.2 % (ref 11.6–15.1)
GFR SERPL CREATININE-BSD FRML MDRD: 114 ML/MIN/1.73SQ M
GLUCOSE SERPL-MCNC: 96 MG/DL (ref 65–140)
HCG SERPL QL: NEGATIVE
HCT VFR BLD AUTO: 42.8 % (ref 34.8–46.1)
HGB BLD-MCNC: 13.3 G/DL (ref 11.5–15.4)
IMM GRANULOCYTES # BLD AUTO: 0.08 THOUSAND/UL (ref 0–0.2)
IMM GRANULOCYTES NFR BLD AUTO: 0 % (ref 0–2)
LYMPHOCYTES # BLD AUTO: 1.95 THOUSANDS/ΜL (ref 0.6–4.47)
LYMPHOCYTES NFR BLD AUTO: 10 % (ref 14–44)
MCH RBC QN AUTO: 27.4 PG (ref 26.8–34.3)
MCHC RBC AUTO-ENTMCNC: 31.1 G/DL (ref 31.4–37.4)
MCV RBC AUTO: 88 FL (ref 82–98)
MONOCYTES # BLD AUTO: 1.27 THOUSAND/ΜL (ref 0.17–1.22)
MONOCYTES NFR BLD AUTO: 7 % (ref 4–12)
NEUTROPHILS # BLD AUTO: 16.26 THOUSANDS/ΜL (ref 1.85–7.62)
NEUTS SEG NFR BLD AUTO: 83 % (ref 43–75)
NRBC BLD AUTO-RTO: 0 /100 WBCS
PLATELET # BLD AUTO: 266 THOUSANDS/UL (ref 149–390)
PMV BLD AUTO: 10.9 FL (ref 8.9–12.7)
POTASSIUM SERPL-SCNC: 3.8 MMOL/L (ref 3.5–5.3)
PROT SERPL-MCNC: 8.3 G/DL (ref 6.4–8.2)
RBC # BLD AUTO: 4.86 MILLION/UL (ref 3.81–5.12)
SODIUM SERPL-SCNC: 140 MMOL/L (ref 136–145)
TSH SERPL DL<=0.05 MIU/L-ACNC: 1.41 UIU/ML (ref 0.45–4.5)
WBC # BLD AUTO: 19.66 THOUSAND/UL (ref 4.31–10.16)

## 2022-06-10 PROCEDURE — 84703 CHORIONIC GONADOTROPIN ASSAY: CPT | Performed by: EMERGENCY MEDICINE

## 2022-06-10 PROCEDURE — 36415 COLL VENOUS BLD VENIPUNCTURE: CPT | Performed by: EMERGENCY MEDICINE

## 2022-06-10 PROCEDURE — 96361 HYDRATE IV INFUSION ADD-ON: CPT

## 2022-06-10 PROCEDURE — 99284 EMERGENCY DEPT VISIT MOD MDM: CPT

## 2022-06-10 PROCEDURE — 80053 COMPREHEN METABOLIC PANEL: CPT | Performed by: EMERGENCY MEDICINE

## 2022-06-10 PROCEDURE — 96374 THER/PROPH/DIAG INJ IV PUSH: CPT

## 2022-06-10 PROCEDURE — 99284 EMERGENCY DEPT VISIT MOD MDM: CPT | Performed by: EMERGENCY MEDICINE

## 2022-06-10 PROCEDURE — 93005 ELECTROCARDIOGRAM TRACING: CPT

## 2022-06-10 PROCEDURE — 85025 COMPLETE CBC W/AUTO DIFF WBC: CPT | Performed by: EMERGENCY MEDICINE

## 2022-06-10 PROCEDURE — 84443 ASSAY THYROID STIM HORMONE: CPT | Performed by: EMERGENCY MEDICINE

## 2022-06-10 RX ORDER — ACETAMINOPHEN 325 MG/1
975 TABLET ORAL ONCE
Status: COMPLETED | OUTPATIENT
Start: 2022-06-10 | End: 2022-06-10

## 2022-06-10 RX ORDER — KETOROLAC TROMETHAMINE 30 MG/ML
15 INJECTION, SOLUTION INTRAMUSCULAR; INTRAVENOUS ONCE
Status: COMPLETED | OUTPATIENT
Start: 2022-06-10 | End: 2022-06-10

## 2022-06-10 RX ADMIN — ACETAMINOPHEN 975 MG: 325 TABLET, FILM COATED ORAL at 22:00

## 2022-06-10 RX ADMIN — KETOROLAC TROMETHAMINE 15 MG: 30 INJECTION, SOLUTION INTRAMUSCULAR; INTRAVENOUS at 22:00

## 2022-06-10 RX ADMIN — SODIUM CHLORIDE 1000 ML: 0.9 INJECTION, SOLUTION INTRAVENOUS at 21:46

## 2022-06-10 NOTE — Clinical Note
Vision Hill Meter was seen and treated in our emergency department on 6/10/2022  Diagnosis: palpitations and lightheadedness    Vision  may return to work on return date  She may return on this date: 06/12/2022         If you have any questions or concerns, please don't hesitate to call        Tenisha Pinzon MD    ______________________________           _______________          _______________  Hospital Representative                              Date                                Time

## 2022-06-11 NOTE — ED ATTENDING ATTESTATION
6/10/2022  I, Elvis Wong MD, saw and evaluated the patient  I have discussed the patient with the resident/non-physician practitioner and agree with the resident's/non-physician practitioner's findings, Plan of Care, and MDM as documented in the resident's/non-physician practitioner's note, except where noted  All available labs and Radiology studies were reviewed  I was present for key portions of any procedure(s) performed by the resident/non-physician practitioner and I was immediately available to provide assistance  At this point I agree with the current assessment done in the Emergency Department  I have conducted an independent evaluation of this patient a history and physical is as follows:    ED Course     78-year-old female, [employer] employ a, presenting from a medical floor for evaluation  Patient states that she has been having some general malaise and feeling kind of weak all day  Patient states that she was at work when she experienced onset of palpitations, increased weakness, lightheadedness, shortness of breath  She states that while she was on the medical floor she was placed on a monitor and her heart rate was in the 200s  Patient sat down in a chair and had improvement in her heart rate gradually  Patient denies any chest pain, cough, sore throat, fever, abdominal pain, nausea, vomiting, diarrhea  Patient is resting comfortably on the stretcher  Head is normocephalic and atraumatic  Mucous membranes are moist   Neck is supple  No JVD  Heart is regular rate rhythm with no murmurs rubs or gallops  Lungs are clear to auscultation bilaterally  Abdomen is nondistended, soft and nontender  Extremities are unremarkable in appearance  Pulses intact x4 extremities  GCS 15  Motor is 5/5 bilateral upper and lower extremities      Labs Reviewed   CBC AND DIFFERENTIAL - Abnormal       Result Value Ref Range Status    WBC 19 66 (*) 4 31 - 10 16 Thousand/uL Final    RBC 4  86  3 81 - 5 12 Million/uL Final    Hemoglobin 13 3  11 5 - 15 4 g/dL Final    Hematocrit 42 8  34 8 - 46 1 % Final    MCV 88  82 - 98 fL Final    MCH 27 4  26 8 - 34 3 pg Final    MCHC 31 1 (*) 31 4 - 37 4 g/dL Final    RDW 13 2  11 6 - 15 1 % Final    MPV 10 9  8 9 - 12 7 fL Final    Platelets 739  344 - 390 Thousands/uL Final    nRBC 0  /100 WBCs Final    Neutrophils Relative 83 (*) 43 - 75 % Final    Immat GRANS % 0  0 - 2 % Final    Lymphocytes Relative 10 (*) 14 - 44 % Final    Monocytes Relative 7  4 - 12 % Final    Eosinophils Relative 0  0 - 6 % Final    Basophils Relative 0  0 - 1 % Final    Neutrophils Absolute 16 26 (*) 1 85 - 7 62 Thousands/µL Final    Immature Grans Absolute 0 08  0 00 - 0 20 Thousand/uL Final    Lymphocytes Absolute 1 95  0 60 - 4 47 Thousands/µL Final    Monocytes Absolute 1 27 (*) 0 17 - 1 22 Thousand/µL Final    Eosinophils Absolute 0 06  0 00 - 0 61 Thousand/µL Final    Basophils Absolute 0 04  0 00 - 0 10 Thousands/µL Final   COMPREHENSIVE METABOLIC PANEL - Abnormal    Sodium 140  136 - 145 mmol/L Final    Potassium 3 8  3 5 - 5 3 mmol/L Final    Chloride 111 (*) 100 - 108 mmol/L Final    CO2 21  21 - 32 mmol/L Final    ANION GAP 8  4 - 13 mmol/L Final    BUN 19  5 - 25 mg/dL Final    Creatinine 0 75  0 60 - 1 30 mg/dL Final    Comment: Standardized to IDMS reference method    Glucose 96  65 - 140 mg/dL Final    Comment: If the patient is fasting, the ADA then defines impaired fasting glucose as > 100 mg/dL and diabetes as > or equal to 123 mg/dL  Specimen collection should occur prior to Sulfasalazine administration due to the potential for falsely depressed results  Specimen collection should occur prior to Sulfapyridine administration due to the potential for falsely elevated results      Calcium 9 6  8 3 - 10 1 mg/dL Final    AST 24  5 - 45 U/L Final    Comment: Specimen collection should occur prior to Sulfasalazine administration due to the potential for falsely depressed results  ALT 63  12 - 78 U/L Final    Comment: Specimen collection should occur prior to Sulfasalazine and/or Sulfapyridine administration due to the potential for falsely depressed results  Alkaline Phosphatase 128 (*) 46 - 116 U/L Final    Total Protein 8 3 (*) 6 4 - 8 2 g/dL Final    Albumin 4 0  3 5 - 5 0 g/dL Final    Total Bilirubin 0 38  0 20 - 1 00 mg/dL Final    Comment: Use of this assay is not recommended for patients undergoing treatment with eltrombopag due to the potential for falsely elevated results  eGFR 114  ml/min/1 73sq m Final    Narrative:     [place] guidelines for Chronic Kidney Disease (CKD):     Stage 1 with normal or high GFR (GFR > 90 mL/min/1 73 square meters)    Stage 2 Mild CKD (GFR = 60-89 mL/min/1 73 square meters)    Stage 3A Moderate CKD (GFR = 45-59 mL/min/1 73 square meters)    Stage 3B Moderate CKD (GFR = 30-44 mL/min/1 73 square meters)    Stage 4 Severe CKD (GFR = 15-29 mL/min/1 73 square meters)    Stage 5 End Stage CKD (GFR <15 mL/min/1 73 square meters)  Note: GFR calculation is accurate only with a steady state creatinine   TSH, 3RD GENERATION WITH FREE T4 REFLEX - Normal    TSH 3RD GENERATON 1 410  0 450 - 4 500 uIU/mL Final    Comment: The recommended reference ranges for TSH during pregnancy are as follows:   First trimester 0 1 to 2 5 uIU/mL   Second trimester  0 2 to 3 0 uIU/mL   Third trimester 0 3 to 3 0 uIU/m    Note: Normal ranges may not apply to patients who are transgender, non-binary, or whose legal sex, sex at birth, and gender identity differ  Adult TSH (3rd generation) reference range follows the recommended guidelines of the American Thyroid Association, January, 2020  Narrative:     Patients undergoing fluorescein dye angiography may retain small amounts of fluorescein in the body for 48-72 hours post procedure  Samples containing fluorescein can produce falsely depressed TSH values   If the patient had this procedure,a specimen should be resubmitted post fluorescein clearance       PREGNANCY TEST (HCG QUALITATIVE) - Normal    Preg, Serum Negative  Negative Final           Critical Care Time  Procedures

## 2022-06-11 NOTE — DISCHARGE INSTRUCTIONS
Please follow-up primary care provider  Recommend Tylenol 650 mg ibuprofen 600 mg every 6 hours as needed for pain  Please stay well hydrated  Please return for severe chest pain, significant shortness of breath, fainting, or any other concerning signs or symptoms  Please refer to following documents for additional instructions and return precautions

## 2022-06-11 NOTE — ED PROVIDER NOTES
History  Chief Complaint   Patient presents with    Dizziness     Pt reports feeling fatigue, headache, and dizziness starting today  pt reports she felt palpitations, lightheadedness, and had HR in 120's while at work      27-year-old female no reported past history presenting with multiple complaints  Patient reports worsening fatigue over the past 1-2 days  While at work this evening, she started feeling worse  At this point she had developed a mild frontal headache and had been having some palpitations  She then had brief episode of shortness of breath and felt very lightheaded  Denies any syncopal event  She was assisted by another tach and nurse and laid down  She states that on the cardiac monitor, her heart rate was in the 200s for about a minute and then gradually came down to low 100s  Upon sitting up it would jump up into the 130s before coming back down with rest   Reports history of palpitations but no known history of abnormal rhythm including SVT  Denies any chest pain  Currently reporting some fatigue and mild frontal headache  Palpitations, shortness of breath, lightheadedness resolved  States she is currently on her menstrual cycle which is light and denies any significant bleeding  Also states she had a COVID test which was negative this evening  Denies any other complaints  Prior to Admission Medications   Prescriptions Last Dose Informant Patient Reported?  Taking?   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[facility])     around age 4 or 4 - has EKG done every 2 years    Severe menstrual cramps     Visual impairment     Yeast infection        Past Surgical History:   Procedure Laterality Date    NO PAST SURGERIES         Family History   Problem Relation Age of Onset    No Known Problems Mother     No Known Problems Father     No Known Problems Sister     No Known Problems Brother     No Known Problems Sister     No Known Problems Sister     No Known Problems Brother     No Known Problems Brother     Cancer Family         colon    Breast cancer Paternal Grandmother     Diabetes Paternal Grandmother     Hypertension Maternal Grandmother      I have reviewed and agree with the history as documented  E-Cigarette/Vaping    E-Cigarette Use Former User      E-Cigarette/Vaping Substances    Nicotine Yes     THC No     CBD No     Flavoring No     Other No     Unknown No      Social History     Tobacco Use    Smoking status: Never Smoker    Smokeless tobacco: Never Used   Vaping Use    Vaping Use: Former    Substances: Nicotine   Substance Use Topics    Alcohol use: Not Currently     Comment: rarely    Drug use: Not Currently     Types: Marijuana     Comment: Occ        Review of Systems   Constitutional: Positive for fatigue  Negative for appetite change, chills, diaphoresis, fever and unexpected weight change  HENT: Negative for congestion and rhinorrhea  Eyes: Negative for photophobia and visual disturbance  Respiratory: Positive for shortness of breath  Negative for cough and chest tightness  Cardiovascular: Positive for palpitations  Negative for chest pain and leg swelling  Gastrointestinal: Negative for abdominal distention, abdominal pain, blood in stool, constipation, diarrhea, nausea and vomiting  Genitourinary: Negative for dysuria and hematuria  Musculoskeletal: Negative for back pain, joint swelling, neck pain and neck stiffness  Skin: Negative for color change, pallor, rash and wound  Neurological: Positive for light-headedness and headaches   Negative for dizziness, syncope and weakness  Psychiatric/Behavioral: Negative for agitation  All other systems reviewed and are negative  Physical Exam  ED Triage Vitals   Temperature Pulse Respirations Blood Pressure SpO2   06/10/22 2125 06/10/22 2125 06/10/22 2125 06/10/22 2125 06/10/22 2125   98 3 °F (36 8 °C) (!) 107 18 143/75 100 %      Temp Source Heart Rate Source Patient Position - Orthostatic VS BP Location FiO2 (%)   06/10/22 2125 06/10/22 2125 06/10/22 2330 06/10/22 2330 --   Oral Monitor Lying Left arm       Pain Score       06/10/22 2200       5             Orthostatic Vital Signs  Vitals:    06/10/22 2125 06/10/22 2245 06/10/22 2330 06/10/22 2344   BP: 143/75  153/71    Pulse: (!) 107 96 (!) 116 94   Patient Position - Orthostatic VS:   Lying        Physical Exam  Vitals and nursing note reviewed  Constitutional:       General: She is not in acute distress  Appearance: Normal appearance  She is well-developed  She is not ill-appearing, toxic-appearing or diaphoretic  HENT:      Head: Normocephalic and atraumatic  Nose: Nose normal  No congestion or rhinorrhea  Mouth/Throat:      Mouth: Mucous membranes are moist       Pharynx: Oropharynx is clear  No oropharyngeal exudate or posterior oropharyngeal erythema  Eyes:      General: No scleral icterus  Right eye: No discharge  Left eye: No discharge  Extraocular Movements: Extraocular movements intact  Conjunctiva/sclera: Conjunctivae normal       Pupils: Pupils are equal, round, and reactive to light  Neck:      Vascular: No JVD  Trachea: No tracheal deviation  Cardiovascular:      Rate and Rhythm: Regular rhythm  Tachycardia present  Heart sounds: Normal heart sounds  No murmur heard  No friction rub  No gallop  Comments: Rate in the low 100s and regular rhythm  Pulmonary:      Effort: Pulmonary effort is normal  No respiratory distress  Breath sounds: Normal breath sounds  No stridor  No wheezing or rales  Comments: Clear to auscultation bilaterally  Chest:      Chest wall: No tenderness  Abdominal:      General: Bowel sounds are normal  There is no distension  Palpations: Abdomen is soft  Tenderness: There is no abdominal tenderness  There is no right CVA tenderness, left CVA tenderness, guarding or rebound  Comments: Soft, nontender, nondistended  Normal bowel sounds throughout   Musculoskeletal:         General: No swelling, tenderness, deformity or signs of injury  Normal range of motion  Cervical back: Normal range of motion and neck supple  No rigidity  No muscular tenderness  Right lower leg: No edema  Left lower leg: No edema  Lymphadenopathy:      Cervical: No cervical adenopathy  Skin:     General: Skin is warm and dry  Coloration: Skin is not pale  Findings: No erythema or rash  Neurological:      General: No focal deficit present  Mental Status: She is alert  Mental status is at baseline  Sensory: No sensory deficit  Motor: No weakness or abnormal muscle tone  Coordination: Coordination normal       Gait: Gait normal       Comments: A&Ox3 to person, place, and time  CN 2-12 intact  Strength 5/5 throughout  Sensation grossly intact  Cerebellar exam including gait intact  Psychiatric:         Behavior: Behavior normal          Thought Content:  Thought content normal          ED Medications  Medications   sodium chloride 0 9 % bolus 1,000 mL (0 mL Intravenous Stopped 6/10/22 2246)   acetaminophen (TYLENOL) tablet 975 mg (975 mg Oral Given 6/10/22 2200)   ketorolac (TORADOL) injection 15 mg (15 mg Intravenous Given 6/10/22 2200)       Diagnostic Studies  Results Reviewed     Procedure Component Value Units Date/Time    TSH, 3rd generation with Free T4 reflex [[id number]]  (Normal) Collected: 06/10/22 2145    Lab Status: Final result Specimen: Blood from Arm, Left Updated: 06/10/22 2223     TSH 3RD GENERATON 1 410 uIU/mL     Narrative:      Patients undergoing fluorescein dye angiography may retain small amounts of fluorescein in the body for 48-72 hours post procedure  Samples containing fluorescein can produce falsely depressed TSH values  If the patient had this procedure,a specimen should be resubmitted post fluorescein clearance        hCG, qualitative pregnancy [[id number]]  (Normal) Collected: 06/10/22 2145    Lab Status: Final result Specimen: Blood from Arm, Left Updated: 06/10/22 2223     Preg, Serum Negative    Comprehensive metabolic panel [[id number]]  (Abnormal) Collected: 06/10/22 2145    Lab Status: Final result Specimen: Blood from Arm, Left Updated: 06/10/22 2216     Sodium 140 mmol/L      Potassium 3 8 mmol/L      Chloride 111 mmol/L      CO2 21 mmol/L      ANION GAP 8 mmol/L      BUN 19 mg/dL      Creatinine 0 75 mg/dL      Glucose 96 mg/dL      Calcium 9 6 mg/dL      AST 24 U/L      ALT 63 U/L      Alkaline Phosphatase 128 U/L      Total Protein 8 3 g/dL      Albumin 4 0 g/dL      Total Bilirubin 0 38 mg/dL      eGFR 114 ml/min/1 73sq m     Narrative:      [facility] guidelines for Chronic Kidney Disease (CKD):     Stage 1 with normal or high GFR (GFR > 90 mL/min/1 73 square meters)    Stage 2 Mild CKD (GFR = 60-89 mL/min/1 73 square meters)    Stage 3A Moderate CKD (GFR = 45-59 mL/min/1 73 square meters)    Stage 3B Moderate CKD (GFR = 30-44 mL/min/1 73 square meters)    Stage 4 Severe CKD (GFR = 15-29 mL/min/1 73 square meters)    Stage 5 End Stage CKD (GFR <15 mL/min/1 73 square meters)  Note: GFR calculation is accurate only with a steady state creatinine    CBC and differential [[id number]]  (Abnormal) Collected: 06/10/22 2145    Lab Status: Final result Specimen: Blood from Arm, Left Updated: 06/10/22 2155     WBC 19 66 Thousand/uL      RBC 4 86 Million/uL      Hemoglobin 13 3 g/dL      Hematocrit 42 8 %      MCV 88 fL      MCH 27 4 pg      MCHC 31 1 g/dL      RDW 13 2 % MPV 10 9 fL      Platelets 819 Thousands/uL      nRBC 0 /100 WBCs      Neutrophils Relative 83 %      Immat GRANS % 0 %      Lymphocytes Relative 10 %      Monocytes Relative 7 %      Eosinophils Relative 0 %      Basophils Relative 0 %      Neutrophils Absolute 16 26 Thousands/µL      Immature Grans Absolute 0 08 Thousand/uL      Lymphocytes Absolute 1 95 Thousands/µL      Monocytes Absolute 1 27 Thousand/µL      Eosinophils Absolute 0 06 Thousand/µL      Basophils Absolute 0 04 Thousands/µL                  No orders to display         Procedures  Procedures      ED Course                                       MDM  Number of Diagnoses or Management Options  Fatigue  Headache  Lightheadedness  Palpitations  Diagnosis management comments: 56-year-old female no reported past history presenting with multiple complaints  Elevated heart rate in 200s concerning for possible abnormal rhythm such as SVT  However, abnormal for slow decrease in heart rate if patient was in SVT  Poor p o  Intake including fluids today, dehydration potentially contributing  Plan for EKG and basic labs  IV fluids  Symptom management with oral and IV pain medication  Reassess  EKG sinus tachycardia without acute ST changes  Labs no acute process  Symptoms improved after medication  Ambulatory without difficulty or lightheadedness  Discussed results and recommendations  Advised follow-up primary care provider and Cardiology  Work note  Medication recommendations  Given instructions and return precautions  All questions answered  Patient acknowledged understanding of all written and verbal instructions and return precautions  Discharge         Amount and/or Complexity of Data Reviewed  Clinical lab tests: reviewed and ordered  Tests in the radiology section of CPT®: reviewed  Tests in the medicine section of CPT®: reviewed and ordered  Review and summarize past medical records: yes  Discuss the patient with other providers: yes  Independent visualization of images, tracings, or specimens: yes    Risk of Complications, Morbidity, and/or Mortality  Presenting problems: moderate  Diagnostic procedures: moderate  Management options: moderate    Patient Progress  Patient progress: improved      Disposition  Final diagnoses:   Lightheadedness   Palpitations   Fatigue   Headache     Time reflects when diagnosis was documented in both MDM as applicable and the Disposition within this note     Time User Action Codes Description Comment    6/10/2022 10:03 PM Max Morning Add [R42] Lightheadedness     6/10/2022 10:04 PM Max Morning Add [R00 2] Palpitations     6/10/2022 10:04 PM Max Morning Add [R53 83] Fatigue     6/10/2022 10:04 PM Max Morning Add [R51 9] Headache       ED Disposition     ED Disposition   Discharge    Condition   Stable    Date/Time   Fri Chandana 10, 2022 10:03 PM    Comment   Jules Parikh discharge to home/self care  Follow-up Information     Follow up With Specialties Details Why Contact Info Additional [facility]  Schedule an appointment as soon as possible for a visit  [phone]  [facility] [id number]     [address] [place] [address] Cardiology Schedule an appointment as soon as possible for a visit in 1 week As needed [address] [phone]  [facility], [address], [place], [address]          Patient's Medications   Discharge Prescriptions    No medications on file     No discharge procedures on file  PDMP Review     None           ED Provider  Attending physically available and evaluated Jules Parikh  I managed the patient along with the ED Attending      Electronically Signed by         Alhaji Carlson MD  06/10/22 1307

## 2022-06-12 LAB
ATRIAL RATE: 102 BPM
ATRIAL RATE: 95 BPM
P AXIS: 72 DEGREES
P AXIS: 80 DEGREES
PR INTERVAL: 136 MS
PR INTERVAL: 140 MS
QRS AXIS: 93 DEGREES
QRS AXIS: 94 DEGREES
QRSD INTERVAL: 76 MS
QRSD INTERVAL: 84 MS
QT INTERVAL: 334 MS
QT INTERVAL: 342 MS
QTC INTERVAL: 429 MS
QTC INTERVAL: 435 MS
T WAVE AXIS: 61 DEGREES
T WAVE AXIS: 67 DEGREES
VENTRICULAR RATE: 102 BPM
VENTRICULAR RATE: 95 BPM

## 2022-06-12 PROCEDURE — 93010 ELECTROCARDIOGRAM REPORT: CPT | Performed by: INTERNAL MEDICINE

## 2022-06-14 ENCOUNTER — OFFICE VISIT (OUTPATIENT)
Dept: URGENT CARE | Age: 22
End: 2022-06-14
Payer: MEDICARE

## 2022-06-14 VITALS
RESPIRATION RATE: 18 BRPM | SYSTOLIC BLOOD PRESSURE: 129 MMHG | OXYGEN SATURATION: 97 % | TEMPERATURE: 98.7 F | DIASTOLIC BLOOD PRESSURE: 96 MMHG | HEART RATE: 93 BPM

## 2022-06-14 DIAGNOSIS — H66.91 RIGHT OTITIS MEDIA, UNSPECIFIED OTITIS MEDIA TYPE: Primary | ICD-10-CM

## 2022-06-14 PROCEDURE — 99213 OFFICE O/P EST LOW 20 MIN: CPT | Performed by: STUDENT IN AN ORGANIZED HEALTH CARE EDUCATION/TRAINING PROGRAM

## 2022-06-14 RX ORDER — AMOXICILLIN 875 MG/1
875 TABLET, COATED ORAL 2 TIMES DAILY
Qty: 14 TABLET | Refills: 0 | Status: SHIPPED | OUTPATIENT
Start: 2022-06-14 | End: 2022-06-21

## 2022-06-14 RX ORDER — FLUTICASONE PROPIONATE 50 MCG
1 SPRAY, SUSPENSION (ML) NASAL DAILY
Qty: 11.1 ML | Refills: 1 | Status: SHIPPED | OUTPATIENT
Start: 2022-06-14

## 2022-06-14 NOTE — PROGRESS NOTES
[facility]        NAME: Shaneka Delgado is a 25 y o  female  : 2000    MRN: [id number]  DATE: 2022  TIME: 6:25 PM    Assessment and Plan   Right otitis media, unspecified otitis media type [H66 91]  1  Right otitis media, unspecified otitis media type  amoxicillin (AMOXIL) 875 mg tablet    fluticasone (FLONASE) 50 mcg/act nasal spray         Patient Instructions       Follow up with PCP in 3-5 days  Proceed to  ER if symptoms worsen  Chief Complaint     Chief Complaint   Patient presents with    Earache     Right ear pain since Saturday    Fever     Started Saturday    Diarrhea     Started today         History of Present Illness       HPI  Patient presents complaining of right ear pain ongoing since Saturday  Patient also had a fever which started on Saturday as well  Today he has started having diarrhea  Patient denies any weakness numbness tingling loss sensation chest pain shortness a breath at this time  He denies any loss of hearing fall     Review of Systems   Review of Systems  Per hpi     Current Medications       Current Outpatient Medications:     amoxicillin (AMOXIL) 875 mg tablet, Take 1 tablet (875 mg total) by mouth 2 (two) times a day for 7 days, Disp: 14 tablet, Rfl: 0    fluticasone (FLONASE) 50 mcg/act nasal spray, 1 spray into each nostril daily, Disp: 11 1 mL, Rfl: 1    brompheniramine-pseudoephedrine-DM 30-2-10 MG/5ML syrup, Take 5 mL by mouth 4 (four) times a day as needed for allergies, Disp: 180 mL, Rfl: 0    etonogestrel (NEXPLANON) subdermal implant, Inject 68 mg under the skin, Disp: , Rfl:     Current Allergies     Allergies as of 2022    (No Known Allergies)            The following portions of the patient's history were reviewed and updated as appropriate: allergies, current medications, past family history, past medical history, past social history, past surgical history and problem list      Past Medical History:   Diagnosis Date    Anxiety     Depression 2/1/2022    FTND (full term normal delivery) 2001    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[facility])     around age 3 or 4 - has EKG done every 2 years    Severe menstrual cramps     Visual impairment     Yeast infection        Past Surgical History:   Procedure Laterality Date    NO PAST SURGERIES         Family History   Problem Relation Age of Onset    No Known Problems Mother     No Known Problems Father     No Known Problems Sister     No Known Problems Brother     No Known Problems Sister     No Known Problems Sister     No Known Problems Brother     No Known Problems Brother     Cancer Family         colon    Breast cancer Paternal Grandmother     Diabetes Paternal Grandmother     Hypertension Maternal Grandmother          Medications have been verified  Objective   /96 (BP Location: Left arm, Patient Position: Sitting, Cuff Size: Standard)   Pulse 93   Temp 98 7 °F (37 1 °C) (Temporal)   Resp 18   SpO2 97%   No LMP recorded  Patient has had an implant  Physical Exam     Physical Exam  Constitutional:       General: She is not in acute distress  Appearance: Normal appearance  HENT:      Head: Normocephalic  Right Ear: Ear canal normal       Left Ear: Tympanic membrane and ear canal normal       Ears:      Comments:  R TM erythema      Nose: Congestion present  No rhinorrhea  Mouth/Throat:      Mouth: Mucous membranes are moist       Pharynx: No oropharyngeal exudate or posterior oropharyngeal erythema  Eyes:      General:         Right eye: No discharge  Left eye: No discharge  Conjunctiva/sclera: Conjunctivae normal    Cardiovascular:      Rate and Rhythm: Normal rate and regular rhythm  Pulses: Normal pulses  Pulmonary:      Effort: Pulmonary effort is normal  No respiratory distress  Abdominal:      General: Abdomen is flat  There is no distension  Palpations: Abdomen is soft  Tenderness:  There is no abdominal tenderness  Musculoskeletal:      Cervical back: Neck supple  Lymphadenopathy:      Cervical: Cervical adenopathy present  Skin:     General: Skin is warm  Capillary Refill: Capillary refill takes less than 2 seconds  Neurological:      Mental Status: She is alert and oriented to person, place, and time

## 2022-06-21 ENCOUNTER — TELEMEDICINE (OUTPATIENT)
Dept: BEHAVIORAL/MENTAL HEALTH CLINIC | Facility: CLINIC | Age: 22
End: 2022-06-21
Payer: COMMERCIAL

## 2022-06-21 DIAGNOSIS — F41.1 GENERALIZED ANXIETY DISORDER: Primary | ICD-10-CM

## 2022-06-21 DIAGNOSIS — F32.A DEPRESSION, UNSPECIFIED DEPRESSION TYPE: ICD-10-CM

## 2022-06-21 PROCEDURE — 90834 PSYTX W PT 45 MINUTES: CPT | Performed by: COUNSELOR

## 2022-06-21 NOTE — BH TREATMENT PLAN
Jules Funez  2000         Date of Initial Treatment Plan: 1/25/22  Date of Current Treatment Plan: 6/21/22     Treatment Plan Number 2     Strengths/Personal Resources for Self Care: Good work ethic, motivation, supportive friends and good communication       Diagnosis:   1  Depression, unspecified depression type      2  Generalized anxiety disorder            Area of Needs: Increase use of coping skills and emotional expression        Long Term Goal 1: Decrease overall frequency, duration and intensity of anxiety so that daily functioning is not impaired     Target Date: 11/18/22  Completion Date: 12/18/22         Short Term Objectives for Goal 1: 1  Develop positive self talk   2  Identify and express feelings in therapy  3  Communicate thoughts and feelings to supportive others in life   4  Learn and implement coping skills         GOAL 1: Modality: Individual 2x per month   Completion Date 12/18/22 and The person(s) responsible for carrying out the plan is  Jules and Fatoumata Abraham: Diagnosis and Treatment Plan explained to Maggie Bean relates understanding diagnosis and is agreeable to Treatment Plan  Jules Funez, 2000, actively participated in the review and update of this treatment plan during a virtual session, using the [address] [place] [address]  Jules Funez  provided verbal consent on 6/21/2022 at 12:30 PM  The treatment plan was transcribed into the [employer] App Record at a later time

## 2022-07-03 ENCOUNTER — HOSPITAL ENCOUNTER (EMERGENCY)
Facility: HOSPITAL | Age: 22
Discharge: HOME | End: 2022-07-04
Attending: EMERGENCY MEDICINE
Payer: MEDICARE

## 2022-07-03 VITALS
OXYGEN SATURATION: 98 % | BODY MASS INDEX: 35.87 KG/M2 | HEIGHT: 61 IN | RESPIRATION RATE: 18 BRPM | WEIGHT: 190 LBS | HEART RATE: 100 BPM | DIASTOLIC BLOOD PRESSURE: 75 MMHG | SYSTOLIC BLOOD PRESSURE: 133 MMHG

## 2022-07-03 DIAGNOSIS — R11.2 NAUSEA AND VOMITING: Primary | ICD-10-CM

## 2022-07-03 PROCEDURE — 99284 EMERGENCY DEPT VISIT MOD MDM: CPT | Performed by: EMERGENCY MEDICINE

## 2022-07-03 PROCEDURE — 99283 EMERGENCY DEPT VISIT LOW MDM: CPT

## 2022-07-03 RX ORDER — ONDANSETRON 4 MG/1
4 TABLET, ORALLY DISINTEGRATING ORAL ONCE
Status: COMPLETED | OUTPATIENT
Start: 2022-07-03 | End: 2022-07-03

## 2022-07-03 RX ORDER — ONDANSETRON 4 MG/1
4 TABLET, FILM COATED ORAL EVERY 6 HOURS
Qty: 12 TABLET | Refills: 0 | Status: SHIPPED | OUTPATIENT
Start: 2022-07-03 | End: 2022-08-22 | Stop reason: HOSPADM

## 2022-07-03 RX ORDER — ACETAMINOPHEN 325 MG/1
650 TABLET ORAL ONCE
Status: COMPLETED | OUTPATIENT
Start: 2022-07-03 | End: 2022-07-03

## 2022-07-03 RX ADMIN — ONDANSETRON 4 MG: 4 TABLET, ORALLY DISINTEGRATING ORAL at 22:56

## 2022-07-03 RX ADMIN — ACETAMINOPHEN 650 MG: 325 TABLET, FILM COATED ORAL at 23:44

## 2022-07-03 NOTE — Clinical Note
Vision Vaibhav Yi was seen and treated in our emergency department on 7/3/2022  Diagnosis:     Vision    She may return on this date: 07/05/2022         If you have any questions or concerns, please don't hesitate to call        Madonna Jurado, DO    ______________________________           _______________          _______________  Hospital Representative                              Date                                Time

## 2022-07-04 NOTE — ED ATTENDING ATTESTATION
7/3/2022  ILei MD, saw and evaluated the patient  I have discussed the patient with the resident/non-physician practitioner and agree with the resident's/non-physician practitioner's findings, Plan of Care, and MDM as documented in the resident's/non-physician practitioner's note, except where noted  All available labs and Radiology studies were reviewed  I was present for key portions of any procedure(s) performed by the resident/non-physician practitioner and I was immediately available to provide assistance  At this point I agree with the current assessment done in the Emergency Department  I have conducted an independent evaluation of this patient a history and physical is as follows:    ED Course      Emergency Department Note- Vision Maritza Small 25 y o  female MRN: [id number]    Unit/Bed#: ED 16 Encounter: [id number]    Donato Valadez is a 25 y o  female who presents with   Chief Complaint   Patient presents with    Vomiting     Pt reports eating food yesterday that had made her stomach upset, started with nausea this morning  Vomiting all day, c/o stomach cramping  Cant keep anything down          History of Present Illness   HPI:  Donato Valadez is a 25 y o  female who presents for evaluation of:  Multiple episodes of nausea and vomiting since this morning at 11 am  Patient has sick contacts at work with similar symptoms  Patient has had some diarrhea as well  Patient notes some associated abdominal discomfort when she vomits; her associated discomfort feels like churning and is mid epigastric  Patient notes that laying down somewhat palliates her symptoms  Review of Systems   Constitutional: Negative for chills and fever  HENT: Negative for congestion and rhinorrhea  Respiratory: Negative for cough and shortness of breath  Cardiovascular: Negative for chest pain and palpitations  Genitourinary: Negative for dysuria and vaginal bleeding     Neurological: Positive for dizziness and light-headedness  No LMP recorded  Patient has had an implant  Historical Information   Past Medical History:   Diagnosis Date    Anxiety     Depression 2022    FTND (full term normal delivery) 2001    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 - has EKG done every 2 years    Severe menstrual cramps     Visual impairment     Yeast infection      Past Surgical History:   Procedure Laterality Date    NO PAST SURGERIES       Social History   Social History     Substance and Sexual Activity   Alcohol Use Not Currently    Comment: rarely     Social History     Substance and Sexual Activity   Drug Use Not Currently    Types: Marijuana    Comment: Occ     Social History     Tobacco Use   Smoking Status Never Smoker   Smokeless Tobacco Never Used     Family History:   Family History   Problem Relation Age of Onset    No Known Problems Mother     No Known Problems Father     No Known Problems Sister     No Known Problems Brother     No Known Problems Sister     No Known Problems Sister     No Known Problems Brother     No Known Problems Brother     Cancer Family         colon    Breast cancer Paternal Grandmother     Diabetes Paternal Grandmother     Hypertension Maternal Grandmother        Meds/Allergies   PTA meds:   Prior to Admission Medications   Prescriptions Last Dose Informant Patient Reported?  Taking?   brompheniramine-pseudoephedrine-DM 30-2-10 MG/5ML syrup   No No   Sig: Take 5 mL by mouth 4 (four) times a day as needed for allergies   etonogestrel (NEXPLANON) subdermal implant   Yes No   Sig: Inject 68 mg under the skin   fluticasone (FLONASE) 50 mcg/act nasal spray   No No   Si spray into each nostril daily      Facility-Administered Medications: None     No Known Allergies    Objective   First Vitals:   Blood Pressure: 133/75 (22)  Pulse: 100 (220)  Respirations: 18 (22)  Height: 5' 1" (154 9 cm) (22)  Weight - Scale: 86 2 kg (190 lb) (22)  SpO2: 98 % (22)    Current Vitals:   Blood Pressure: 133/75 (22)  Pulse: 100 (22)  Respirations: 18 (22)  Height: 5' 1" (154 9 cm) (22)  Weight - Scale: 86 2 kg (190 lb) (22)  SpO2: 98 % (22)    No intake or output data in the 24 hours ending 22    Invasive Devices  Report    None                 Physical Exam  Vitals and nursing note reviewed  Constitutional:       General: She is not in acute distress  Appearance: Normal appearance  She is well-developed  HENT:      Head: Normocephalic and atraumatic  Right Ear: External ear normal       Left Ear: External ear normal       Nose: Nose normal       Mouth/Throat:      Pharynx: No oropharyngeal exudate  Eyes:      Conjunctiva/sclera: Conjunctivae normal       Pupils: Pupils are equal, round, and reactive to light  Cardiovascular:      Rate and Rhythm: Normal rate and regular rhythm  Pulmonary:      Effort: Pulmonary effort is normal  No respiratory distress  Abdominal:      General: Abdomen is flat  There is no distension  Palpations: Abdomen is soft  Musculoskeletal:         General: No deformity  Normal range of motion  Cervical back: Normal range of motion and neck supple  Skin:     General: Skin is warm and dry  Capillary Refill: Capillary refill takes less than 2 seconds  Neurological:      General: No focal deficit present  Mental Status: She is alert and oriented to person, place, and time  Mental status is at baseline  Coordination: Coordination normal    Psychiatric:         Mood and Affect: Mood normal          Behavior: Behavior normal          Thought Content: Thought content normal          Judgment: Judgment normal            Medical Decision Makin   Acute nausea, vomiting, diarrhea, and abdominal discomfort; anti emetics    No results found for this or any previous visit (from the past 36 hour(s))  No orders to display         Portions of the record may have been created with voice recognition software  Occasional wrong word or "sound a like" substitutions may have occurred due to the inherent limitations of voice recognition software  Read the chart carefully and recognize, using context, where substitutions have occurred            Critical Care Time  Procedures

## 2022-07-04 NOTE — ED PROVIDER NOTES
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  Taking?   brompheniramine-pseudoephedrine-DM 30-2-10 MG/5ML syrup   No No   Sig: Take 5 mL by mouth 4 (four) times a day as needed for allergies   etonogestrel (NEXPLANON) subdermal implant   Yes No   Sig: Inject 68 mg under the skin   fluticasone (FLONASE) 50 mcg/act nasal spray   No No   Si spray into each nostril daily      Facility-Administered Medications: None       Past Medical History:   Diagnosis Date    Anxiety     Depression 2022    FTND (full term normal delivery) 2001    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[facility] [address] )     around age 4 or 4 - has EKG done every 2 years    Severe menstrual cramps     Visual impairment     Yeast infection        Past Surgical History:   Procedure Laterality Date    NO PAST SURGERIES         Family History   Problem Relation Age of Onset    No Known Problems Mother     No Known Problems Father     No Known Problems Sister     No Known Problems Brother     No Known Problems Sister     No Known Problems Sister     No Known Problems Brother     No Known Problems Brother     Cancer Family         colon    Breast cancer Paternal Grandmother     Diabetes Paternal Grandmother     Hypertension Maternal Grandmother      I have reviewed and agree with the history as documented  E-Cigarette/Vaping    E-Cigarette Use Former User      E-Cigarette/Vaping Substances    Nicotine Yes     THC No     CBD No     Flavoring No     Other No     Unknown No      Social History     Tobacco Use    Smoking status: Never Smoker    Smokeless tobacco: Never Used   Vaping Use    Vaping Use: Former    Substances: Nicotine   Substance Use Topics    Alcohol use: Not Currently     Comment: rarely    Drug use: Not Currently     Types: Marijuana     Comment: Occ        Review of Systems   Constitutional: Positive for activity change  Negative for chills and fever  HENT: Negative for congestion, ear pain and sore throat  Eyes: Negative for pain and visual disturbance  Respiratory: Negative for cough, chest tightness and shortness of breath  Cardiovascular: Negative for chest pain and palpitations  Gastrointestinal: Positive for constipation, nausea and vomiting  Negative for abdominal pain, blood in stool and diarrhea  Genitourinary: Negative for dysuria and hematuria  Musculoskeletal: Negative for arthralgias and back pain  Skin: Negative for color change and rash  Neurological: Negative for dizziness, seizures, syncope, weakness, light-headedness, numbness and headaches  Psychiatric/Behavioral: Negative for agitation and behavioral problems  All other systems reviewed and are negative        Physical Exam  ED Triage Vitals [07/03/22 2250]   Temp Pulse Respirations Blood Pressure SpO2   -- 100 18 133/75 98 %      Temp src Heart Rate Source Patient Position - Orthostatic VS BP Location FiO2 (%)   -- Monitor Lying Left arm --      Pain Score       8             Orthostatic Vital Signs  Vitals:    07/03/22 2250   BP: 133/75   Pulse: 100   Patient Position - Orthostatic VS: Lying Physical Exam  Vitals and nursing note reviewed  Constitutional:       General: She is not in acute distress  Appearance: Normal appearance  She is well-developed  HENT:      Head: Normocephalic and atraumatic  Right Ear: Tympanic membrane, ear canal and external ear normal       Left Ear: Tympanic membrane, ear canal and external ear normal       Nose: Nose normal       Mouth/Throat:      Mouth: Mucous membranes are moist       Pharynx: No posterior oropharyngeal erythema  Eyes:      Extraocular Movements: Extraocular movements intact  Conjunctiva/sclera: Conjunctivae normal       Pupils: Pupils are equal, round, and reactive to light  Cardiovascular:      Rate and Rhythm: Normal rate and regular rhythm  Pulses: Normal pulses  Heart sounds: Normal heart sounds  No murmur heard  Pulmonary:      Effort: Pulmonary effort is normal  No respiratory distress  Breath sounds: Normal breath sounds  No rhonchi  Abdominal:      General: Abdomen is flat  Palpations: Abdomen is soft  Tenderness: There is no abdominal tenderness  There is no guarding or rebound  Musculoskeletal:         General: Normal range of motion  Cervical back: Normal range of motion and neck supple  Right lower leg: No edema  Left lower leg: No edema  Skin:     General: Skin is warm and dry  Capillary Refill: Capillary refill takes less than 2 seconds  Neurological:      General: No focal deficit present  Mental Status: She is alert and oriented to person, place, and time     Psychiatric:         Mood and Affect: Mood normal          Behavior: Behavior normal          ED Medications  Medications   ondansetron (ZOFRAN-ODT) dispersible tablet 4 mg (4 mg Oral Given 7/3/22 2256)   acetaminophen (TYLENOL) tablet 650 mg (650 mg Oral Given 7/3/22 2344)       Diagnostic Studies  Results Reviewed     None                 No orders to display         Procedures  Procedures      ED Course  ED Course as of 07/03/22 2349   Sun Jul 03, 2022   2326 Blood Pressure: 133/75   2326 Pulse: 100   2326 Respirations: 18   2326 SpO2: 98 %   2344 Treated patient with Zofran as well as Tylenol  P o  Challenge she was able to keep everything down she states that the Zofran helped her feel a lot better  Patient advised to take the Zofran at home for symptoms  Advised her to make sure she stays hydrated  Advised her to follow-up with her primary care doctor in a few days for re-evaluation  Advised to come back to the hospital she develops any new, worsening or concerning symptoms  Patient understands has no questions or concerns at this time stable for discharge  SBIRT 22yo+    Flowsheet Row Most Recent Value   SBIRT (23 yo +)    In order to provide better care to our patients, we are screening all of our patients for alcohol and drug use  Would it be okay to ask you these screening questions? No Filed at: 07/03/2022 2255                MDM    Disposition  Final diagnoses:   Nausea and vomiting     Time reflects when diagnosis was documented in both MDM as applicable and the Disposition within this note     Time User Action Codes Description Comment    7/3/2022 11:48 PM Teresita Overall Add [R11 2] Nausea and vomiting       ED Disposition     ED Disposition   Discharge    Condition   Stable    Date/Time   Sun Jul 3, 2022 11:48 PM    Comment   Jules Poole discharge to home/self care                 Follow-up Information     Follow up With Specialties Details Why Contact Info Additional [address] Emergency Department Emergency Medicine Go to  As needed, If symptoms worsen [address] [id number]   [address] Emergency Department, [address], [address] [place] [address] Family Medicine Schedule an appointment as soon as possible for a visit  for follow up [address], [address] [facility] [id number]  [address], [address], [address] [place], [place], [address]          Patient's Medications   Discharge Prescriptions    ONDANSETRON (ZOFRAN) 4 MG TABLET    Take 1 tablet (4 mg total) by mouth every 6 (six) hours       Start Date: 7/3/2022  End Date: --       Order Dose: 4 mg       Quantity: 12 tablet    Refills: 0     No discharge procedures on file  PDMP Review     None           ED Provider  Attending physically available and evaluated Vision Laura Velez I managed the patient along with the ED Attending      Electronically Signed by         Marita Favre, DO  07/03/22 0933

## 2022-07-05 ENCOUNTER — TELEMEDICINE (OUTPATIENT)
Dept: BEHAVIORAL/MENTAL HEALTH CLINIC | Facility: CLINIC | Age: 22
End: 2022-07-05
Payer: COMMERCIAL

## 2022-07-05 DIAGNOSIS — F41.1 GENERALIZED ANXIETY DISORDER: ICD-10-CM

## 2022-07-05 DIAGNOSIS — F32.A DEPRESSION, UNSPECIFIED DEPRESSION TYPE: Primary | ICD-10-CM

## 2022-07-05 PROCEDURE — 90834 PSYTX W PT 45 MINUTES: CPT | Performed by: COUNSELOR

## 2022-07-05 NOTE — PSYCH
Virtual Regular Visit    Verification of patient location:    Patient is located in the following state in which I hold an active license [place]      Assessment/Plan:    Problem List Items Addressed This Visit        Other    Generalized anxiety disorder    Depression - Primary          Goals addressed in session: Goal 1 and Goal 2          Reason for visit is No chief complaint on file  Encounter provider Marvel Fitch    Provider located at [address] [place] [id number] [phone]      Recent Visits  No visits were found meeting these conditions  Showing recent visits within past 7 days and meeting all other requirements  Future Appointments  No visits were found meeting these conditions  Showing future appointments within next 150 days and meeting all other requirements       The patient was identified by name and date of birth  Purnima Escudero was informed that this is a telemedicine visit and that the visit is being conducted throughEpic Embedded and patient was informed this is a secure, HIPAA-complaint platform  She agrees to proceed  My office door was closed  No one else was in the room  She acknowledged consent and understanding of privacy and security of the video platform  The patient has agreed to participate and understands they can discontinue the visit at any time  Patient is aware this is a billable service  Subjective  Purnima Escudero is a 25 y o  female     D: Clinician met with Vision via telehealth for individual therapy  She discussed recent incident where she got food poisoning  She processed anxiety related to missing some school and work being sick for two days straight and having to go to the emergency room for medication   Clinician explored ways to manage symptoms and work on recovery such as eating more regularly again and drinking to help improve physical and mental symptoms  Clinician explored and encouraged coping skills and time management for schooling and work life balance  A: Vision was open and engaged in the session  P: Continue to meet with Vision every other week for individual therapy  HPI     Past Medical History:   Diagnosis Date    Anxiety     Depression 2/1/2022    FTND (full term normal delivery) 2001    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 4 or 4 - has EKG done every 2 years    Severe menstrual cramps     Visual impairment     Yeast infection        Past Surgical History:   Procedure Laterality Date    NO PAST SURGERIES         Current Outpatient Medications   Medication Sig Dispense Refill    brompheniramine-pseudoephedrine-DM 30-2-10 MG/5ML syrup Take 5 mL by mouth 4 (four) times a day as needed for allergies 180 mL 0    etonogestrel (NEXPLANON) subdermal implant Inject 68 mg under the skin      fluticasone (FLONASE) 50 mcg/act nasal spray 1 spray into each nostril daily 11 1 mL 1    ondansetron (ZOFRAN) 4 mg tablet Take 1 tablet (4 mg total) by mouth every 6 (six) hours 12 tablet 0     No current facility-administered medications for this visit  No Known Allergies    Review of Systems    Video Exam    There were no vitals filed for this visit  Physical Exam     I spent 45 minutes directly with the patient during this visit    VIRTUAL VISIT Keerthi Sykes verbally agrees to participate in [employer]  Pt is aware that Virtual Care Services could be limited without vital signs or the ability to perform a full hands-on physical Hyla Speak understands she or the provider may request at any time to terminate the video visit and request the patient to seek care or treatment in person

## 2022-07-20 ENCOUNTER — TELEPHONE (OUTPATIENT)
Dept: PSYCHIATRY | Facility: CLINIC | Age: 22
End: 2022-07-20

## 2022-07-20 NOTE — TELEPHONE ENCOUNTER
NO-SHOW LETTER MAILED TO Jules Moise    ADDRESS: [place] [address]   [address] [facility] [address]

## 2022-08-10 ENCOUNTER — TELEPHONE (OUTPATIENT)
Dept: PSYCHIATRY | Facility: CLINIC | Age: 22
End: 2022-08-10

## 2022-08-10 NOTE — TELEPHONE ENCOUNTER
Writer porfirio for patient to contact the office to schedule a follow up appt with provider at their earliest convenience

## 2022-08-11 ENCOUNTER — OFFICE VISIT (OUTPATIENT)
Dept: OBGYN CLINIC | Facility: CLINIC | Age: 22
End: 2022-08-11

## 2022-08-11 VITALS
HEIGHT: 61 IN | DIASTOLIC BLOOD PRESSURE: 80 MMHG | SYSTOLIC BLOOD PRESSURE: 119 MMHG | WEIGHT: 190.2 LBS | BODY MASS INDEX: 35.91 KG/M2 | HEART RATE: 79 BPM

## 2022-08-11 DIAGNOSIS — B37.31 VULVOVAGINAL CANDIDIASIS: Primary | ICD-10-CM

## 2022-08-11 PROCEDURE — 99213 OFFICE O/P EST LOW 20 MIN: CPT | Performed by: NURSE PRACTITIONER

## 2022-08-11 RX ORDER — FLUCONAZOLE 150 MG/1
150 TABLET ORAL
Qty: 2 TABLET | Refills: 0 | Status: SHIPPED | OUTPATIENT
Start: 2022-08-11 | End: 2022-08-15

## 2022-08-11 NOTE — PROGRESS NOTES
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)     around age 4 or 4 - has EKG done every 2 years    Severe menstrual cramps     Visual impairment     Yeast infection      Past Surgical History:   Procedure Laterality Date    NO PAST SURGERIES       OB History        0    Para   0    Term   0       0    AB   0    Living   0       SAB   0    IAB   0    Ectopic   0    Multiple   0    Live Births   0               Social History     Tobacco Use    Smoking status: Never Smoker    Smokeless tobacco: Never Used   Vaping Use    Vaping Use: Former    Substances: Nicotine   Substance Use Topics    Alcohol use: Yes     Alcohol/week: 1 0 standard drink     Types: 1 Standard drinks or equivalent per week     Comment: occassional    Drug use: Not Currently     Types: Marijuana     Comment: Occ     Social History     Substance and Sexual Activity   Sexual Activity Yes    Partners: Male    Birth control/protection: Condom Male, None, Implant       Current Outpatient Medications   Medication Instructions    brompheniramine-pseudoephedrine-DM 30-2-10 MG/5ML syrup 5 mL, Oral, 4 times daily PRN    etonogestrel (NEXPLANON) 68 mg, Subcutaneous    fluticasone (FLONASE) 50 mcg/act nasal spray 1 spray, Nasal, Daily    ondansetron (ZOFRAN) 4 mg, Oral, Every 6 hours       History of Present Illness:   Vision presents today reporting a week long history of vaginal itching and thick clumpy discharge  States she has had issues with recurrent vaginal candidiasis  She used a one day Monistat treatment last week   She started her menses the next day so was unsure if it resolved her discharge, but it only temporarily improved the itching  She reports no new hygiene products or sexual partners  Does admit to rinsing inside her vaginal canal during showers, though uses water only  Does report she is frequently in workout clothing which she sometimes must keep on for awhile after her workout due to her schedule  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, works 12 hour shifts and reports excessive sweating in her scrubs due to the nature of the work and the temperature  Review of Systems:  Review of Systems   Constitutional: Negative  Gastrointestinal: Negative  Genitourinary: Positive for vaginal discharge  Vaginal itching        Physical Exam:  /80   Pulse 79   Ht 5' 1" (1 549 m)   Wt 86 3 kg (190 lb 3 2 oz)   LMP 08/01/2022 (Exact Date)   BMI 35 94 kg/m²   Physical Exam  Constitutional:       General: She is not in acute distress  Appearance: Normal appearance  Genitourinary:      Vulva normal       No lesions in the vagina  Vaginal erythema present  Vaginal exam comments: No discharge, washed internally prior to appt  Right Adnexa: not tender  No cervical motion tenderness or lesion  Uterus is not enlarged or tender  Neurological:      Mental Status: She is alert  Skin:     General: Skin is warm and dry  Psychiatric:         Mood and Affect: Mood normal          Behavior: Behavior normal    Vitals reviewed  Assessment:   1  Vulvovaginal candidiasis    Plan:   1  Unable to visualize on wet mount due to recently washing the vaginal canal, no discharge present  2  Reviewed vulvar skin care measures  Encouraged to discontinue rinsing internal vaginal canal and discontinue scented washes  Recommended bringing a dry sent of clothing to Gym for post workout, cotton underwear, sleeping without underwear  3  Declines STI testing  4  Discussed use of Boric Acid for vaginitis prevention      5  Return for annual exam and first pap  Reviewed with patient that test results are available in MyChart immediately, but that they will not necessarily be reviewed by me immediately  Explained that I will review results at my earliest opportunity and contact patient appropriately

## 2022-08-17 DIAGNOSIS — N30.00 ACUTE CYSTITIS WITHOUT HEMATURIA: Primary | ICD-10-CM

## 2022-08-17 RX ORDER — NITROFURANTOIN 25; 75 MG/1; MG/1
100 CAPSULE ORAL 2 TIMES DAILY
Qty: 10 CAPSULE | Refills: 0 | Status: SHIPPED | OUTPATIENT
Start: 2022-08-17 | End: 2022-08-22 | Stop reason: HOSPADM

## 2022-08-18 ENCOUNTER — TELEMEDICINE (OUTPATIENT)
Dept: BEHAVIORAL/MENTAL HEALTH CLINIC | Facility: CLINIC | Age: 22
End: 2022-08-18
Payer: COMMERCIAL

## 2022-08-18 DIAGNOSIS — F32.A DEPRESSION, UNSPECIFIED DEPRESSION TYPE: ICD-10-CM

## 2022-08-18 DIAGNOSIS — F41.1 GENERALIZED ANXIETY DISORDER: Primary | ICD-10-CM

## 2022-08-18 PROCEDURE — 90834 PSYTX W PT 45 MINUTES: CPT | Performed by: COUNSELOR

## 2022-08-18 NOTE — PSYCH
Virtual Regular Visit    Verification of patient location:    Patient is located in the following state in which I hold an active license [place]      Assessment/Plan:    Problem List Items Addressed This Visit        Other    Generalized anxiety disorder - Primary    Depression          Goals addressed in session: Goal 1 and Goal 2          Reason for visit is No chief complaint on file  Encounter provider Valerie Knapp    Provider located at [address] [place] [id number] [phone]      Recent Visits  No visits were found meeting these conditions  Showing recent visits within past 7 days and meeting all other requirements  Future Appointments  No visits were found meeting these conditions  Showing future appointments within next 150 days and meeting all other requirements       The patient was identified by name and date of birth  Vision Greg Álvarez was informed that this is a telemedicine visit and that the visit is being conducted throughEpic Embedded and patient was informed this is a secure, HIPAA-complaint platform  She agrees to proceed     My office door was closed  No one else was in the room  She acknowledged consent and understanding of privacy and security of the video platform  The patient has agreed to participate and understands they can discontinue the visit at any time  Patient is aware this is a billable service  Subjective  Vision Angella Grimes is a 25 y o  female     D: Clinician met with Vision via telehealth for individual therapy  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 get back into rhythm of school again  Vision reports that she is happy with her grades from the last semester and feeling less anxious about beginning a new semester in a week   Clinician validated decrease in symptoms and supports that have assisted with this such as support friend group in school, paramour and family  Vision reports desire to get into a better schedule of working out during the next semester and plans to do so by keeping more consistency in her schedule overall  Clinician explored the strategies she has been able to use in the past and use of self care thought the semester  A: Vision was open and engaged in the session and reports plans to continued use of self care routines  P: Continue to meet with Vision every other week for individual therapy  HPI     Past Medical History:   Diagnosis Date    Anxiety     Depression 2/1/2022    FTND (full term normal delivery) 2001    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[facility])     around age 4 or 4 - has EKG done every 2 years    Severe menstrual cramps     Visual impairment     Yeast infection        Past Surgical History:   Procedure Laterality Date    NO PAST SURGERIES         Current Outpatient Medications   Medication Sig Dispense Refill    nitrofurantoin (MACROBID) 100 mg capsule Take 1 capsule (100 mg total) by mouth 2 (two) times a day for 5 days 10 capsule 0    brompheniramine-pseudoephedrine-DM 30-2-10 MG/5ML syrup Take 5 mL by mouth 4 (four) times a day as needed for allergies (Patient not taking: Reported on 8/11/2022) 180 mL 0    etonogestrel (NEXPLANON) subdermal implant Inject 68 mg under the skin      fluticasone (FLONASE) 50 mcg/act nasal spray 1 spray into each nostril daily (Patient not taking: Reported on 8/11/2022) 11 1 mL 1    ondansetron (ZOFRAN) 4 mg tablet Take 1 tablet (4 mg total) by mouth every 6 (six) hours (Patient not taking: Reported on 8/11/2022) 12 tablet 0     No current facility-administered medications for this visit  No Known Allergies    Review of Systems    Video Exam    There were no vitals filed for this visit      Physical Exam     I spent 45 minutes directly with the patient during this visit

## 2022-08-22 ENCOUNTER — HOSPITAL ENCOUNTER (OUTPATIENT)
Dept: RADIOLOGY | Facility: HOSPITAL | Age: 22
Discharge: HOME | End: 2022-08-22
Payer: MEDICARE

## 2022-08-22 ENCOUNTER — APPOINTMENT (OUTPATIENT)
Dept: LAB | Facility: CLINIC | Age: 22
End: 2022-08-22
Payer: MEDICARE

## 2022-08-22 ENCOUNTER — OFFICE VISIT (OUTPATIENT)
Dept: INTERNAL MEDICINE CLINIC | Facility: CLINIC | Age: 22
End: 2022-08-22

## 2022-08-22 VITALS
OXYGEN SATURATION: 99 % | HEIGHT: 61 IN | BODY MASS INDEX: 35.91 KG/M2 | DIASTOLIC BLOOD PRESSURE: 74 MMHG | TEMPERATURE: 98.5 F | SYSTOLIC BLOOD PRESSURE: 109 MMHG | WEIGHT: 190.2 LBS | HEART RATE: 78 BPM

## 2022-08-22 DIAGNOSIS — Z00.00 ANNUAL PHYSICAL EXAM: Primary | ICD-10-CM

## 2022-08-22 DIAGNOSIS — M54.42 ACUTE MIDLINE LOW BACK PAIN WITH BILATERAL SCIATICA: ICD-10-CM

## 2022-08-22 DIAGNOSIS — T78.40XD ALLERGY, SUBSEQUENT ENCOUNTER: ICD-10-CM

## 2022-08-22 DIAGNOSIS — M54.41 ACUTE MIDLINE LOW BACK PAIN WITH BILATERAL SCIATICA: ICD-10-CM

## 2022-08-22 DIAGNOSIS — R39.15 URINARY URGENCY: ICD-10-CM

## 2022-08-22 LAB
HCV AB SER QL: NORMAL
SL AMB  POCT GLUCOSE, UA: NEGATIVE
SL AMB LEUKOCYTE ESTERASE,UA: NEGATIVE
SL AMB POCT BILIRUBIN,UA: NEGATIVE
SL AMB POCT BLOOD,UA: NEGATIVE
SL AMB POCT CLARITY,UA: CLEAR
SL AMB POCT COLOR,UA: YELLOW
SL AMB POCT KETONES,UA: NEGATIVE
SL AMB POCT NITRITE,UA: NEGATIVE
SL AMB POCT PH,UA: 5
SL AMB POCT SPECIFIC GRAVITY,UA: 1.02
SL AMB POCT URINE PROTEIN: NEGATIVE
SL AMB POCT UROBILINOGEN: 0.2

## 2022-08-22 PROCEDURE — 87389 HIV-1 AG W/HIV-1&-2 AB AG IA: CPT

## 2022-08-22 PROCEDURE — 72110 X-RAY EXAM L-2 SPINE 4/>VWS: CPT

## 2022-08-22 PROCEDURE — 87491 CHLMYD TRACH DNA AMP PROBE: CPT

## 2022-08-22 PROCEDURE — 87591 N.GONORRHOEAE DNA AMP PROB: CPT

## 2022-08-22 PROCEDURE — 86803 HEPATITIS C AB TEST: CPT

## 2022-08-22 PROCEDURE — 99395 PREV VISIT EST AGE 18-39: CPT | Performed by: INTERNAL MEDICINE

## 2022-08-22 PROCEDURE — 81002 URINALYSIS NONAUTO W/O SCOPE: CPT | Performed by: INTERNAL MEDICINE

## 2022-08-22 PROCEDURE — 36415 COLL VENOUS BLD VENIPUNCTURE: CPT

## 2022-08-22 RX ORDER — LORATADINE 10 MG/1
10 TABLET ORAL DAILY
Qty: 30 TABLET | Refills: 3 | Status: SHIPPED | OUTPATIENT
Start: 2022-08-22

## 2022-08-22 RX ORDER — LORATADINE 10 MG/1
10 TABLET ORAL DAILY PRN
COMMUNITY
End: 2022-08-22 | Stop reason: ALTCHOICE

## 2022-08-22 RX ORDER — CYCLOBENZAPRINE HCL 10 MG
5 TABLET ORAL 3 TIMES DAILY PRN
Qty: 20 TABLET | Refills: 0 | Status: SHIPPED | OUTPATIENT
Start: 2022-08-22

## 2022-08-22 NOTE — PROGRESS NOTES
ADULT ANNUAL [address] [place]    NAME: Jules Sykes  AGE: 25 y o  SEX: female  : 2000     DATE: 2022     Assessment and Plan:     Problem List Items Addressed This Visit        Other    Back pain    Relevant Medications    cyclobenzaprine (FLEXERIL) 10 mg tablet    Other Relevant Orders    XR spine lumbar minimum 4 views non injury      Other Visit Diagnoses     Annual physical exam    -  Primary    Relevant Orders    Lipid Panel with Direct LDL reflex    Allergy, subsequent encounter        Relevant Medications    loratadine (CLARITIN) 10 mg tablet    Urinary urgency        Relevant Orders    HIV 1/2 Antigen/Antibody (4th Generation) w Reflex [facility]    Hepatitis C antibody    Chlamydia/GC amplified DNA by PCR          Plan:  Regarding the patient's back pain, she was markedly tender on exam and reporting some incontinence issues  Will investigate further with a lumbar x-ray  Flexeril for symptomatic relief  Patient will take over-the-counter ibuprofen  In addition, the patient stating that she has some fullness in her right ear, has a history of allergies  She states that she will starting Flonase again, along with loratadine  Serum in disimpaction was performed in the office today  In regards to the patient's urinary urgency, states that she practices good hygiene  Does not have a history of UTIs  Urine dip in the office today negative for leukocytes and nitrates  Patient educated on importance of utilizing avoiding schedule, not trying to hold in her urine as much  Will investigate further with STD screening  PHQ-2/9 Depression Screening        The ASCVD Risk score (Denisha Menendez et al , 2013) failed to calculate for the following reasons:     The 2013 ASCVD risk score is only valid for ages 36 to 79No results found for: HGBA1C No results found for: HEPCAB   Most Recent Immunizations   Administered Date(s) Administered    COVID-19 MODERNA VACC 0 25 ML IM BOOSTER 01/04/2022    DTP 01/06/2006    HPV Quadrivalent 02/20/2013    Hep A, adult 11/13/2008    Hep B, adult 08/04/2004    Hib (PRP-OMP) 05/22/2013    INFLUENZA 09/20/2021    IPV 01/06/2006    Influenza, injectable, quadrivalent, preservative free 0 5 mL 09/17/2020    Influenza, seasonal, injectable 01/22/2013    MMR 01/06/2006    Meningococcal B, Recombinant (Madhu Treadwellrick) 08/12/2019    Meningococcal MCV4P 08/10/2018    Meningococcal, Unknown Serogroups 04/26/2012    OPV 01/06/2006    Pneumococcal Conjugate PCV 7 2001    Tdap 04/26/2012    Tuberculin Skin Test-PPD Intradermal 05/14/2004    Varicella 11/20/2010    influenza, injectable, quadrivalent 11/08/2018    Not on file Not on file No components found for: HIV1X2     HIV/HCV ordered  Follows with OBGYN      Immunizations and preventive care screenings were discussed with patient today  Appropriate education was printed on patient's after visit summary  Counseling:  Alcohol/drug use: discussed moderation in alcohol intake, the recommendations for healthy alcohol use, and avoidance of illicit drug use  Dental Health: discussed importance of regular tooth brushing, flossing, and dental visits  Injury prevention: discussed safety/seat belts, safety helmets, smoke detectors, carbon dioxide detectors, and smoking near bedding or upholstery  Sexual health: discussed sexually transmitted diseases, partner selection, use of condoms, avoidance of unintended pregnancy, and contraceptive alternatives  · Exercise: the importance of regular exercise/physical activity was discussed  Recommend exercise 3-5 times per week for at least 30 minutes  BMI Counseling: Body mass index is 35 94 kg/m²   The BMI is above normal  Nutrition recommendations include decreasing portion sizes, encouraging healthy choices of fruits and vegetables, decreasing fast food intake, consuming healthier snacks, limiting drinks that contain sugar, moderation in carbohydrate intake, increasing intake of lean protein, reducing intake of saturated and trans fat and reducing intake of cholesterol  Exercise recommendations include moderate physical activity 150 minutes/week  Rationale for BMI follow-up plan is due to patient being overweight or obese  Depression Screening and Follow-up Plan: Clincally patient does not have depression  No treatment is required  Return in about 1 year (around 8/22/2023) for Annual physical      Chief Complaint:     Chief Complaint   Patient presents with    Annual Exam      History of Present Illness:     Adult Annual Physical   Patient here for a comprehensive physical exam  The patient reports problems - Urinary urgency and back pain  Patient states that she has been having back pain for about the past week and half  This is different than back pain before  Worse with movement  Patient has full range of motion, but exquisitely tender over her spinous processes of her lumbar spine  In addition, she has been having urinary urgency issues  No history of UTIs, history of yeast infections  Negative urine dip in the office today  Educated on importance of avoiding schedule  Diet and Physical Activity  · Diet/Nutrition: well balanced diet  · Exercise: walking and moderate cardiovascular exercise  Depression Screening  PHQ-2/9 Depression Screening         General Health  · Sleep: sleeps well  · Hearing: normal - bilateral   · Vision: no vision problems  · Dental: regular dental visits  /GYN Health  · Last menstrual period: 2 weeks ago  · Contraceptive method: subdural implant  · History of STDs?: no      Review of Systems:     Review of Systems   Constitutional: Negative for chills and fever  HENT: Negative for congestion, mouth sores, nosebleeds and postnasal drip  Respiratory: Negative for cough and shortness of breath      Gastrointestinal: Negative for abdominal distention, constipation, diarrhea and nausea  Genitourinary: Positive for dysuria  Musculoskeletal: Positive for back pain  Skin: Negative for rash  Neurological: Negative for dizziness and headaches  Psychiatric/Behavioral: Negative for agitation and confusion  Past Medical History:     Past Medical History:   Diagnosis Date    Anxiety     Depression 2/1/2022    FTND (full term normal delivery) 2001    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)     around age 4 or 4 - has EKG done every 2 years    Severe menstrual cramps     Visual impairment     Yeast infection       Past Surgical History:     Past Surgical History:   Procedure Laterality Date    NO PAST SURGERIES        Social History:     Social History     Socioeconomic History    Marital status: Single     Spouse name: None    Number of children: None    Years of education: None    Highest education level: None   Occupational History    None   Tobacco Use    Smoking status: Never Smoker    Smokeless tobacco: Never Used   Vaping Use    Vaping Use: Former    Substances: Nicotine   Substance and Sexual Activity    Alcohol use: Yes     Alcohol/week: 1 0 standard drink     Types: 1 Standard drinks or equivalent per week     Comment: occassional    Drug use: Not Currently     Types: Marijuana     Comment: Occ    Sexual activity: Yes     Partners: Male     Birth control/protection: Condom Male, None, Implant   Other Topics Concern    None   Social History Narrative    None     Social Determinants of Health     Financial Resource Strain: Low Risk     Difficulty of Paying Living Expenses: Not hard at all   Food Insecurity: No Food Insecurity    Worried About Running Out of Food in the Last Year: Never true    Nancy of Food in the Last Year: Never true   Transportation Needs: No Transportation Needs    Lack of Transportation (Medical): No    Lack of Transportation (Non-Medical):  No   Physical Activity: Not on file   Stress: Not on file Social Connections: Not on file   Intimate Partner Violence: Not on file   Housing Stability: Low Risk     Unable to Pay for Housing in the Last Year: No    Number of Places Lived in the Last Year: 1    Unstable Housing in the Last Year: No      Family History:     Family History   Problem Relation Age of Onset    No Known Problems Mother     No Known Problems Father     No Known Problems Sister     No Known Problems Brother     No Known Problems Sister     No Known Problems Sister     No Known Problems Brother     No Known Problems Brother     Cancer Family         colon    Breast cancer Paternal Grandmother     Diabetes Paternal Grandmother     Hypertension Maternal Grandmother       Current Medications:     Current Outpatient Medications   Medication Sig Dispense Refill    cyclobenzaprine (FLEXERIL) 10 mg tablet Take 0 5 tablets (5 mg total) by mouth 3 (three) times a day as needed for muscle spasms 20 tablet 0    etonogestrel (NEXPLANON) subdermal implant Inject 68 mg under the skin      loratadine (CLARITIN) 10 mg tablet Take 1 tablet (10 mg total) by mouth daily 30 tablet 3     No current facility-administered medications for this visit  Allergies:     No Known Allergies   Physical Exam:     /74 (BP Location: Left arm, Patient Position: Sitting, Cuff Size: Large)   Pulse 78   Temp 98 5 °F (36 9 °C) (Temporal)   Ht 5' 1" (1 549 m)   Wt 86 3 kg (190 lb 3 2 oz)   LMP 08/01/2022 (Exact Date)   SpO2 99%   BMI 35 94 kg/m²     Physical Exam  Vitals reviewed  Constitutional:       General: She is not in acute distress  HENT:      Head: Normocephalic and atraumatic  Right Ear: External ear normal  There is impacted cerumen  Left Ear: External ear normal       Nose: Nose normal       Mouth/Throat:      Mouth: Mucous membranes are moist       Pharynx: Oropharynx is clear  Eyes:      Extraocular Movements: Extraocular movements intact        Pupils: Pupils are equal, round, and reactive to light  Cardiovascular:      Rate and Rhythm: Normal rate and regular rhythm  Pulmonary:      Effort: Pulmonary effort is normal       Breath sounds: Normal breath sounds  Abdominal:      General: Abdomen is flat  Bowel sounds are normal  There is no distension  Tenderness: There is no abdominal tenderness  Musculoskeletal:      Cervical back: Normal range of motion  Right lower leg: No edema  Left lower leg: No edema  Comments: Patient with full range of motion of lumbar spine, but markedly tender over the spinous processes of her lumbar spine  Skin:     General: Skin is warm and dry  Capillary Refill: Capillary refill takes less than 2 seconds  Neurological:      General: No focal deficit present  Mental Status: She is alert and oriented to person, place, and time     Psychiatric:         Mood and Affect: Mood normal          Behavior: Behavior normal           Lila Norwood, [address]

## 2022-08-22 NOTE — PATIENT INSTRUCTIONS

## 2022-08-23 LAB
C TRACH DNA SPEC QL NAA+PROBE: NEGATIVE
HIV 1+2 AB+HIV1 P24 AG SERPL QL IA: NORMAL
N GONORRHOEA DNA SPEC QL NAA+PROBE: NEGATIVE

## 2022-08-24 ENCOUNTER — TELEPHONE (OUTPATIENT)
Dept: INTERNAL MEDICINE CLINIC | Facility: CLINIC | Age: 22
End: 2022-08-24

## 2022-08-24 NOTE — TELEPHONE ENCOUNTER
Patient called for the xray results done 8/22/22 ordered by Dr Bertha Villa    Patient was advised that the radiologist has not sent in the results  Please call the patient when results are in and read by the PCP       thanks

## 2022-08-26 ENCOUNTER — TELEPHONE (OUTPATIENT)
Dept: INTERNAL MEDICINE CLINIC | Facility: CLINIC | Age: 22
End: 2022-08-26

## 2022-08-26 NOTE — TELEPHONE ENCOUNTER
Patient called to see if her X-ray results came in yet? I did let her know that nothing was received yet as of today

## 2022-08-26 NOTE — TELEPHONE ENCOUNTER
Patient called to check on Xray's results but she also wanted to let Doctor know that she is having severe lower back pain  Last week she had mild back pain but she she states this is different sharp stabbing pain  Any type of movement is hard and painful  Also if possible she would need a note for work for today  And would like a call back to be advised on her back pain

## 2022-08-26 NOTE — TELEPHONE ENCOUNTER
Patient made aware to report to the ER if her back pain is severe as we still do not have xray results   I made her aware that unfortunately we have no control over when they are read since it is done at the hospital  Patient verbalized understanding

## 2022-08-26 NOTE — TELEPHONE ENCOUNTER
Hello,    The patient should be seen in the emergency department if her pain is that severe to rule out other causes  Her XR has not been read by a radiologist yet      Thank you,  Paul Wray

## 2022-08-29 ENCOUNTER — TELEPHONE (OUTPATIENT)
Dept: INTERNAL MEDICINE CLINIC | Facility: CLINIC | Age: 22
End: 2022-08-29

## 2022-08-29 NOTE — TELEPHONE ENCOUNTER
Patient returned Meghan's call of 8/29/22 - read the result notes to the patient - who understood but states what can be done for her pain  When resting it's a level 5 and when bending of any movement it's a level 8    She takes medication 2,000 mg of tylenol and muscle spasm it does not work    She has been 3 weeks with the pain  She cannot sit down or bend   The pain is excruciating  Please check into this and call the patient to advise whether or not there's anything else that can be done

## 2022-08-30 DIAGNOSIS — M54.50 LOW BACK PAIN, UNSPECIFIED BACK PAIN LATERALITY, UNSPECIFIED CHRONICITY, UNSPECIFIED WHETHER SCIATICA PRESENT: Primary | ICD-10-CM

## 2022-08-30 NOTE — RESULT ENCOUNTER NOTE
I have not personally evaluated this patient for this problem, so it is difficult to say what the most likely etiology of her backpain is based off of not having examined her and no bony abnormality on Xray  I would advise her to go to the ED if she is having significant pain that she cannot control, or any alarming symptoms such as weakness in her legs, inability to control her bowel or bladder function, or decreased sensation in her genitals or lower extremities  If none of the above alarm symptoms are present, I would recommend she schedule an appointment to be re-evaluated in office  I will also order a MRI of her lumbar spine in the meantime, which she should call to schedule

## 2022-09-01 ENCOUNTER — ANNUAL EXAM (OUTPATIENT)
Dept: OBGYN CLINIC | Facility: CLINIC | Age: 22
End: 2022-09-01

## 2022-09-01 VITALS
BODY MASS INDEX: 36.36 KG/M2 | HEIGHT: 61 IN | DIASTOLIC BLOOD PRESSURE: 79 MMHG | WEIGHT: 192.6 LBS | SYSTOLIC BLOOD PRESSURE: 120 MMHG | HEART RATE: 71 BPM

## 2022-09-01 DIAGNOSIS — R32 INTERMITTENT URINARY INCONTINENCE: ICD-10-CM

## 2022-09-01 DIAGNOSIS — Z12.39 ENCOUNTER FOR BREAST CANCER SCREENING USING NON-MAMMOGRAM MODALITY: ICD-10-CM

## 2022-09-01 DIAGNOSIS — Z01.419 WOMEN'S ANNUAL ROUTINE GYNECOLOGICAL EXAMINATION: Primary | ICD-10-CM

## 2022-09-01 PROCEDURE — 87086 URINE CULTURE/COLONY COUNT: CPT | Performed by: NURSE PRACTITIONER

## 2022-09-01 PROCEDURE — 87147 CULTURE TYPE IMMUNOLOGIC: CPT | Performed by: NURSE PRACTITIONER

## 2022-09-01 PROCEDURE — 99395 PREV VISIT EST AGE 18-39: CPT | Performed by: NURSE PRACTITIONER

## 2022-09-01 PROCEDURE — G0145 SCR C/V CYTO,THINLAYER,RESCR: HCPCS | Performed by: NURSE PRACTITIONER

## 2022-09-01 NOTE — PROGRESS NOTES
ANNUAL GYNECOLOGICAL EXAMINATION    Jules Wells is a 25 y o  female who presents today for annual GYN exam  She has not yet had her first pap smear  She has completed the HPV vaccine  She reports menses as irregular with Nexplanon  No LMP recorded  Patient has had an implant  She has had continued issues with low back pain  She is following with her PCP, seeing a chiropractor and has an MRI scheduled  She believes that this has been causing bladder issues, she has been having bladder pain with urine leakage and urinary frequency/urgency but has had negative urine dip testing with PCP  Her general medical history has been reviewed and she reports it as follows:    Past Medical History:   Diagnosis Date    Anxiety     Depression 2022    FTND (full term normal delivery) 2001    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[facility])     around age 4 or 4 - has EKG done every 2 years    Severe menstrual cramps     Visual impairment     Yeast infection      Past Surgical History:   Procedure Laterality Date    NO PAST SURGERIES       OB History        0    Para   0    Term   0       0    AB   0    Living   0       SAB   0    IAB   0    Ectopic   0    Multiple   0    Live Births   0               Social History     Tobacco Use    Smoking status: Never Smoker    Smokeless tobacco: Never Used   Vaping Use    Vaping Use: Former    Substances: Nicotine   Substance Use Topics    Alcohol use: Yes     Alcohol/week: 1 0 standard drink     Types: 1 Standard drinks or equivalent per week     Comment: occassional    Drug use: Not Currently     Types: Marijuana     Comment: Occ     Social History     Substance and Sexual Activity   Sexual Activity Yes    Partners: Male    Birth control/protection: Condom Male, None, Implant     Cancer-related family history includes Breast cancer in her paternal grandmother; Cancer in her family      Current Outpatient Medications   Medication Instructions    cyclobenzaprine (FLEXERIL) 5 mg, Oral, 3 times daily PRN    etonogestrel (NEXPLANON) 68 mg, Subcutaneous    etonogestrel (NEXPLANON) 68 mg, Subdermal, Once    loratadine (CLARITIN) 10 mg, Oral, Daily       Review of Systems:  Review of Systems   Constitutional: Negative  Gastrointestinal: Negative  Genitourinary: Negative  Skin: Negative  Physical Exam:  /79   Pulse 71   Ht 5' 1" (1 549 m)   Wt 87 4 kg (192 lb 9 6 oz)   BMI 36 39 kg/m²   Physical Exam  Constitutional:       General: She is not in acute distress  Appearance: Normal appearance  Genitourinary:      Vulva and bladder normal       No lesions in the vagina  No vaginal erythema or ulceration  Right Adnexa: not tender and no mass present  Left Adnexa: not tender and no mass present  No cervical motion tenderness or lesion  Uterus is not enlarged or tender  No uterine mass detected  Breasts:      Right: No mass, nipple discharge or skin change  Left: No mass, nipple discharge or skin change  Cardiovascular:      Rate and Rhythm: Normal rate and regular rhythm  Pulmonary:      Effort: Pulmonary effort is normal       Breath sounds: Normal breath sounds  Abdominal:      General: Abdomen is flat  Palpations: Abdomen is soft  Musculoskeletal:      Cervical back: Neck supple  Neurological:      Mental Status: She is alert  Skin:     General: Skin is warm and dry  Psychiatric:         Mood and Affect: Mood normal          Behavior: Behavior normal    Vitals reviewed  Assessment/Plan:   1  Normal well-woman GYN exam   2  Cervical cancer screening:  Normal cervical exam   Pap smear done   3  STD screening:  Patient declines, had negative testing last week  4  Breast cancer screening:  Normal breast exam  Reviewed breast self-awareness  5  Depression Screening: Patient's depression screening was assessed with a PHQ-2 score of 0  Their PHQ-9 score was 0   Clinically patient does not have depression  No treatment is required  6  BMI Counseling: Body mass index is 36 39 kg/m²  Discussed the patient's BMI with her  The BMI is above normal  Exercise recommendations include moderate aerobic physical activity for 150 minutes/week and exercising 3-5 times per week  7  Contraception:  Nexplanon   8  Will send urine culture to rule out UTI  Recommended trial of pyridium for possible bladder spasms  9  Return to office in one year for annual or sooner if needed  Reviewed with patient that test results are available in [facility] immediately, but that they will not necessarily be reviewed by me immediately  Explained that I will review results at my earliest opportunity and contact patient appropriately

## 2022-09-02 LAB
BACTERIA UR CULT: ABNORMAL
BACTERIA UR CULT: ABNORMAL

## 2022-09-05 DIAGNOSIS — N30.00 ACUTE CYSTITIS WITHOUT HEMATURIA: Primary | ICD-10-CM

## 2022-09-05 RX ORDER — AMOXICILLIN 500 MG/1
500 CAPSULE ORAL EVERY 12 HOURS SCHEDULED
Qty: 10 CAPSULE | Refills: 0 | Status: SHIPPED | OUTPATIENT
Start: 2022-09-05 | End: 2022-09-10

## 2022-09-07 ENCOUNTER — HOSPITAL ENCOUNTER (EMERGENCY)
Facility: HOSPITAL | Age: 22
Discharge: HOME | End: 2022-09-07
Payer: MEDICARE

## 2022-09-07 VITALS
TEMPERATURE: 98.5 F | DIASTOLIC BLOOD PRESSURE: 75 MMHG | HEART RATE: 63 BPM | OXYGEN SATURATION: 99 % | SYSTOLIC BLOOD PRESSURE: 135 MMHG | RESPIRATION RATE: 18 BRPM

## 2022-09-07 DIAGNOSIS — M54.16 LUMBAR RADICULOPATHY: Primary | ICD-10-CM

## 2022-09-07 DIAGNOSIS — M54.30 SCIATICA: ICD-10-CM

## 2022-09-07 LAB
LAB AP GYN PRIMARY INTERPRETATION: NORMAL
Lab: NORMAL

## 2022-09-07 PROCEDURE — 99284 EMERGENCY DEPT VISIT MOD MDM: CPT | Performed by: EMERGENCY MEDICINE

## 2022-09-07 PROCEDURE — 99283 EMERGENCY DEPT VISIT LOW MDM: CPT

## 2022-09-07 PROCEDURE — 96372 THER/PROPH/DIAG INJ SC/IM: CPT

## 2022-09-07 RX ORDER — LIDOCAINE 50 MG/G
1 PATCH TOPICAL EVERY 24 HOURS
Qty: 7 PATCH | Refills: 0 | Status: SHIPPED | OUTPATIENT
Start: 2022-09-07 | End: 2022-09-14

## 2022-09-07 RX ORDER — METHOCARBAMOL 500 MG/1
500 TABLET, FILM COATED ORAL 2 TIMES DAILY
Qty: 14 TABLET | Refills: 0 | Status: SHIPPED | OUTPATIENT
Start: 2022-09-07 | End: 2022-09-14

## 2022-09-07 RX ORDER — LIDOCAINE 50 MG/G
1 PATCH TOPICAL DAILY
Status: DISCONTINUED | OUTPATIENT
Start: 2022-09-07 | End: 2022-09-07 | Stop reason: HOSPADM

## 2022-09-07 RX ORDER — PREDNISONE 20 MG/1
40 TABLET ORAL DAILY
Qty: 10 TABLET | Refills: 0 | Status: SHIPPED | OUTPATIENT
Start: 2022-09-07 | End: 2022-09-12

## 2022-09-07 RX ORDER — KETOROLAC TROMETHAMINE 30 MG/ML
30 INJECTION, SOLUTION INTRAMUSCULAR; INTRAVENOUS ONCE
Status: COMPLETED | OUTPATIENT
Start: 2022-09-07 | End: 2022-09-07

## 2022-09-07 RX ORDER — IBUPROFEN 600 MG/1
600 TABLET ORAL EVERY 8 HOURS PRN
Qty: 30 TABLET | Refills: 0 | Status: SHIPPED | OUTPATIENT
Start: 2022-09-07

## 2022-09-07 RX ORDER — METHYLPREDNISOLONE ACETATE 80 MG/ML
60 INJECTION, SUSPENSION INTRA-ARTICULAR; INTRALESIONAL; INTRAMUSCULAR; SOFT TISSUE ONCE
Status: COMPLETED | OUTPATIENT
Start: 2022-09-07 | End: 2022-09-07

## 2022-09-07 RX ADMIN — KETOROLAC TROMETHAMINE 30 MG: 30 INJECTION, SOLUTION INTRAMUSCULAR at 14:15

## 2022-09-07 RX ADMIN — METHYLPREDNISOLONE ACETATE 60 MG: 80 INJECTION, SUSPENSION INTRA-ARTICULAR; INTRALESIONAL; INTRAMUSCULAR; SOFT TISSUE at 14:32

## 2022-09-07 RX ADMIN — LIDOCAINE 5% 1 PATCH: 700 PATCH TOPICAL at 14:15

## 2022-09-07 NOTE — ED ATTENDING ATTESTATION
9/7/2022  IShayy MD, saw and evaluated the patient  I have discussed the patient with the resident/non-physician practitioner and agree with the resident's/non-physician practitioner's findings, Plan of Care, and MDM as documented in the resident's/non-physician practitioner's note, except where noted  All available labs and Radiology studies were reviewed  I was present for key portions of any procedure(s) performed by the resident/non-physician practitioner and I was immediately available to provide assistance  At this point I agree with the current assessment done in the Emergency Department  I have conducted an independent evaluation of this patient a history and physical is as follows: This is a 71-year-old female, with a relevant past medical history of right-sided sciatica presenting to the ED today for low back pain, radiating down her left leg  Patient states that her pain does go down to her feet  She denies any numbness or tingling, saddle anesthesia, bowel or bladder incontinence, surgeries, IV drug use, or any other significantly related symptoms  She has tried at home over-the-counter medications without improvement, she also has tried her prescription muscle relaxant without significant improvement  Patient on exam does have tenderness in her paraspinal muscles in the left lower lumbar region, as well as muscles of her buttock  She received Toradol, Depo-Medrol, lidocaine patch with significant improvement in her symptoms  Patient was discharged home with strict return precautions, and given a prescription for lidocaine patches, steroid burst, ibuprofen, as well as any prescription for muscle relaxants    ED Course         Critical Care Time  Procedures

## 2022-09-07 NOTE — ED PROVIDER NOTES
History  Chief Complaint   Patient presents with    Back Pain     Lower back pain, history of back pain but worse in the last week  Lower back pain radiating down left leg and right buttock  Also was dx with UTI last Friday, on amoxicillin  60-year-old female, history of sciatica, presents to the emergency department with chief complaint lower back pain  Reports the back pain has been progressively getting worse last week  Patient reports she normally has pain to right buttocks but reports last couple weeks she has been developing pain down left lower extremity  Patient states that the pain radiates all the way down to her left foot  Patient reports that she has also had dysuria and urinary frequency for about a month  She has been evaluated by her OB Gyn for the symptoms had a urine culture positive for hemolytic group B strep, she was started on amoxicillin  She reports that she started taking the amoxicillin 1 day ago  She denies any fever, chills, chest pain, shortness of breath, abdominal pain, NV, diarrhea, constipation, bowel incontinence, bladder incontinence, saddle anesthesia, focal weakness, focal paresthesias, injuries or back, any other signs and symptoms  History provided by:  Patient      Prior to Admission Medications   Prescriptions Last Dose Informant Patient Reported?  Taking?   amoxicillin (AMOXIL) 500 mg capsule   No No   Sig: Take 1 capsule (500 mg total) by mouth every 12 (twelve) hours for 5 days   cyclobenzaprine (FLEXERIL) 10 mg tablet   No No   Sig: Take 0 5 tablets (5 mg total) by mouth 3 (three) times a day as needed for muscle spasms   Patient not taking: Reported on 2022   etonogestrel (NEXPLANON) subdermal implant   Yes No   Sig: Inject 68 mg under the skin   etonogestrel (NEXPLANON) subdermal implant   Yes No   Si mg by Subdermal route once   loratadine (CLARITIN) 10 mg tablet   No No   Sig: Take 1 tablet (10 mg total) by mouth daily Facility-Administered Medications: None       Past Medical History:   Diagnosis Date    Anxiety     Depression 2/1/2022    FTND (full term normal delivery) 2001    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[facility])     around age 4 or 4 - has EKG done every 2 years    Severe menstrual cramps     Visual impairment     Yeast infection        Past Surgical History:   Procedure Laterality Date    NO PAST SURGERIES         Family History   Problem Relation Age of Onset    No Known Problems Mother     No Known Problems Father     No Known Problems Sister     No Known Problems Brother     No Known Problems Sister     No Known Problems Sister     No Known Problems Brother     No Known Problems Brother     Cancer Family         colon    Breast cancer Paternal Grandmother     Diabetes Paternal Grandmother     Hypertension Maternal Grandmother      I have reviewed and agree with the history as documented  E-Cigarette/Vaping    E-Cigarette Use Former User      E-Cigarette/Vaping Substances    Nicotine Yes     THC No     CBD No     Flavoring No     Other No     Unknown No      Social History     Tobacco Use    Smoking status: Never Smoker    Smokeless tobacco: Never Used   Vaping Use    Vaping Use: Former    Substances: Nicotine   Substance Use Topics    Alcohol use: Yes     Alcohol/week: 1 0 standard drink     Types: 1 Standard drinks or equivalent per week     Comment: occassional    Drug use: Not Currently     Types: Marijuana     Comment: Occ        Review of Systems   Constitutional: Negative for chills and fever  HENT: Negative for ear pain and sore throat  Eyes: Negative for pain and visual disturbance  Respiratory: Negative for cough and shortness of breath  Cardiovascular: Negative for chest pain and palpitations  Gastrointestinal: Negative for abdominal pain and vomiting  Genitourinary: Positive for dysuria and frequency  Negative for hematuria  Musculoskeletal: Positive for back pain  Negative for arthralgias  Skin: Negative for color change and rash  Neurological: Negative for seizures and syncope  All other systems reviewed and are negative  Physical Exam  ED Triage Vitals [09/07/22 1132]   Temperature Pulse Respirations Blood Pressure SpO2   98 5 °F (36 9 °C) 63 18 135/75 99 %      Temp Source Heart Rate Source Patient Position - Orthostatic VS BP Location FiO2 (%)   Oral Monitor Sitting Right arm --      Pain Score       5             Orthostatic Vital Signs  Vitals:    09/07/22 1132   BP: 135/75   Pulse: 63   Patient Position - Orthostatic VS: Sitting       Physical Exam  Vitals and nursing note reviewed  Constitutional:       General: She is not in acute distress  Appearance: She is well-developed  HENT:      Head: Normocephalic and atraumatic  Eyes:      Conjunctiva/sclera: Conjunctivae normal    Cardiovascular:      Rate and Rhythm: Normal rate and regular rhythm  Heart sounds: No murmur heard  Pulmonary:      Effort: Pulmonary effort is normal  No respiratory distress  Breath sounds: Normal breath sounds  Abdominal:      Palpations: Abdomen is soft  Tenderness: There is no abdominal tenderness  Musculoskeletal:      Cervical back: Normal and neck supple  No tenderness  No pain with movement  Normal range of motion  Thoracic back: Normal  No tenderness  Normal range of motion  Lumbar back: Tenderness (L5/S1 region) present  Decreased range of motion (d/t pain)  Comments: Pain in back with straight leg raise bilaterally  Tenderness to right buttocks over piriformis muscle   Skin:     General: Skin is warm and dry  Neurological:      Mental Status: She is alert and oriented to person, place, and time  Sensory: Sensation is intact  Motor: Motor function is intact  Deep Tendon Reflexes:      Reflex Scores:       Patellar reflexes are 2+ on the right side and 2+ on the left side         Achilles reflexes are 2+ on the right side and 2+ on the left side  Comments: Proprioception intact         ED Medications  Medications   lidocaine (LIDODERM) 5 % patch 1 patch (1 patch Topical Medication Applied 9/7/22 1415)   methylPREDNISolone acetate (DEPO-MEDROL) injection 60 mg (60 mg Intramuscular Given 9/7/22 1432)   ketorolac (TORADOL) injection 30 mg (30 mg Intramuscular Given 9/7/22 1415)       Diagnostic Studies  Results Reviewed     None                 No orders to display         Procedures  Procedures      ED Course             MDM  Number of Diagnoses or Management Options  Lumbar radiculopathy: established and worsening  Sciatica: established and worsening  Diagnosis management comments: Presents with lower back pain with radiation down left leg  Vitals: stable during ED course  Pt receiving: Toradol, lidocaine patch, depo-medrol  Patient had an x-ray 1 week ago for these symptoms  She is scheduled to have an MRI  She is currently being worked up for these symptoms outpatiently  Did not order images during this visit due to current outpatient workup  Tried to provide some pain relief, which was achieved with medications above  Plan: With relief of some pain with medications in the emergency department, will discharge home with prescriptions for lidocaine patch, 600 mg ibuprofen, Robaxin, prednisone  Instructed follow-up with primary care doctor  Also instructed to return to the emergency department for any worsening symptoms, including but not limited to: Fever, weakness in lower extremities, worsening back pain, difficulty urinating, numbness when wiping  Pt is agreeable with plan  Pt was given the opportunity to ask questions  All questions and concerns were addressed during ED visit         Amount and/or Complexity of Data Reviewed  Review and summarize past medical records: yes        Disposition  Final diagnoses:   Lumbar radiculopathy   Sciatica     Time reflects when diagnosis was documented in both MDM as applicable and the Disposition within this note     Time User Action Codes Description Comment    9/7/2022  3:00 PM Melani Prima Add [P27 90] Lumbar radiculopathy     9/7/2022  3:00 PM Melani Prima Add [M41 86] Sciatica       ED Disposition     ED Disposition   Discharge    Condition   Stable    Date/Time   Wed Sep 7, 2022  3:00 PM    Comment   Vision Greg Sykes discharge to home/self care                 Follow-up Information     Follow up With Specialties Details Why Contact Info Additional Information    [facility] Emergency Medicine Go to  As needed, If symptoms worsen [address] Emergency Department, [address], [place], [place], [address]    Your primary doctor  Schedule an appointment as soon as possible for a visit  to follow-up after ER visit            Discharge Medication List as of 9/7/2022  3:06 PM      START taking these medications    Details   ibuprofen (MOTRIN) 600 mg tablet Take 1 tablet (600 mg total) by mouth every 8 (eight) hours as needed for moderate pain, Starting Wed 9/7/2022, Normal      lidocaine (LIDODERM) 5 % Apply 1 patch topically every 24 hours for 7 days Remove & Discard patch within 12 hours or as directed by MD, Starting Wed 9/7/2022, Until Wed 9/14/2022, Print      methocarbamol (ROBAXIN) 500 mg tablet Take 1 tablet (500 mg total) by mouth 2 (two) times a day for 7 days, Starting Wed 9/7/2022, Until Wed 9/14/2022, Normal      predniSONE 20 mg tablet Take 2 tablets (40 mg total) by mouth daily for 5 days, Starting Wed 9/7/2022, Until Mon 9/12/2022, Normal         CONTINUE these medications which have NOT CHANGED    Details   amoxicillin (AMOXIL) 500 mg capsule Take 1 capsule (500 mg total) by mouth every 12 (twelve) hours for 5 days, Starting Mon 9/5/2022, Until Sat 9/10/2022, Normal      cyclobenzaprine (FLEXERIL) 10 mg tablet Take 0 5 tablets (5 mg total) by mouth 3 (three) times a day as needed for muscle spasms, Starting Mon 8/22/2022, Normal      !! etonogestrel (NEXPLANON) subdermal implant Inject 68 mg under the skin, Historical Med      !! etonogestrel (NEXPLANON) subdermal implant 68 mg by Subdermal route once, Historical Med      loratadine (CLARITIN) 10 mg tablet Take 1 tablet (10 mg total) by mouth daily, Starting Mon 8/22/2022, Normal       !! - Potential duplicate medications found  Please discuss with provider  No discharge procedures on file  PDMP Review     None           ED Provider  Attending physically available and evaluated Vision Yumiko Cuadra I managed the patient along with the ED Attending      Electronically Signed by         Jeremías Milner DO  09/07/22 [address]  09/07/22 1648

## 2022-09-07 NOTE — DISCHARGE INSTRUCTIONS
Please apply lidocaine patch to your back for 12 hours then remove  Take Robaxin as directed, do not take with other muscle relaxant  Please take prednisone and ibuprofen as directed  Please follow-up with your primary care doctor for after ER visit follow-up    Please return to the emergency department if you have any worsening symptoms, including but not limited to:  Fever, weakness in lower extremities, worsening back pain, difficulty urinating, numbness when wiping

## 2022-09-12 ENCOUNTER — TELEPHONE (OUTPATIENT)
Dept: BEHAVIORAL/MENTAL HEALTH CLINIC | Facility: CLINIC | Age: 22
End: 2022-09-12

## 2022-09-12 NOTE — TELEPHONE ENCOUNTER
NO-SHOW LETTER MAILED TO Jules Sykes    ADDRESS: [place] [address]   [address] [facility] [id number]

## 2022-09-18 ENCOUNTER — HOSPITAL ENCOUNTER (OUTPATIENT)
Dept: RADIOLOGY | Facility: HOSPITAL | Age: 22
Discharge: HOME | End: 2022-09-18
Payer: MEDICARE

## 2022-09-18 DIAGNOSIS — M54.50 LOW BACK PAIN, UNSPECIFIED BACK PAIN LATERALITY, UNSPECIFIED CHRONICITY, UNSPECIFIED WHETHER SCIATICA PRESENT: ICD-10-CM

## 2022-09-18 PROCEDURE — G1004 CDSM NDSC: HCPCS

## 2022-09-18 PROCEDURE — A9585 GADOBUTROL INJECTION: HCPCS | Performed by: STUDENT IN AN ORGANIZED HEALTH CARE EDUCATION/TRAINING PROGRAM

## 2022-09-18 PROCEDURE — 72158 MRI LUMBAR SPINE W/O & W/DYE: CPT

## 2022-09-18 RX ADMIN — GADOBUTROL 8 ML: 604.72 INJECTION INTRAVENOUS at 13:00

## 2022-09-21 ENCOUNTER — OFFICE VISIT (OUTPATIENT)
Dept: OBGYN CLINIC | Facility: CLINIC | Age: 22
End: 2022-09-21

## 2022-09-21 VITALS
HEIGHT: 61 IN | HEART RATE: 92 BPM | WEIGHT: 189.2 LBS | BODY MASS INDEX: 35.72 KG/M2 | DIASTOLIC BLOOD PRESSURE: 85 MMHG | SYSTOLIC BLOOD PRESSURE: 126 MMHG

## 2022-09-21 DIAGNOSIS — R30.0 DYSURIA: ICD-10-CM

## 2022-09-21 DIAGNOSIS — R35.0 FREQUENT URINATION: ICD-10-CM

## 2022-09-21 DIAGNOSIS — R30.0 BURNING WITH URINATION: ICD-10-CM

## 2022-09-21 DIAGNOSIS — R39.9 UTI SYMPTOMS: Primary | ICD-10-CM

## 2022-09-21 LAB
SL AMB  POCT GLUCOSE, UA: NEGATIVE
SL AMB LEUKOCYTE ESTERASE,UA: NEGATIVE
SL AMB POCT BILIRUBIN,UA: NEGATIVE
SL AMB POCT BLOOD,UA: ABNORMAL
SL AMB POCT CLARITY,UA: ABNORMAL
SL AMB POCT COLOR,UA: ABNORMAL
SL AMB POCT KETONES,UA: NEGATIVE
SL AMB POCT NITRITE,UA: NEGATIVE
SL AMB POCT PH,UA: 6
SL AMB POCT SPECIFIC GRAVITY,UA: 1.02
SL AMB POCT URINE PROTEIN: NEGATIVE
SL AMB POCT UROBILINOGEN: 0.2

## 2022-09-21 PROCEDURE — 87086 URINE CULTURE/COLONY COUNT: CPT | Performed by: NURSE PRACTITIONER

## 2022-09-21 PROCEDURE — 81002 URINALYSIS NONAUTO W/O SCOPE: CPT | Performed by: NURSE PRACTITIONER

## 2022-09-21 PROCEDURE — 99213 OFFICE O/P EST LOW 20 MIN: CPT | Performed by: NURSE PRACTITIONER

## 2022-09-21 NOTE — PROGRESS NOTES
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[facility])     around age 4 or 4 - has EKG done every 2 years    Severe menstrual cramps     Visual impairment     Yeast infection      Past Surgical History:   Procedure Laterality Date    NO PAST SURGERIES       OB History        0    Para   0    Term   0       0    AB   0    Living   0       SAB   0    IAB   0    Ectopic   0    Multiple   0    Live Births   0               Social History     Tobacco Use    Smoking status: Never Smoker    Smokeless tobacco: Never Used   Vaping Use    Vaping Use: Former    Substances: Nicotine   Substance Use Topics    Alcohol use: Yes     Alcohol/week: 1 0 standard drink     Types: 1 Standard drinks or equivalent per week     Comment: occassional    Drug use: Not Currently     Types: Marijuana     Comment: Occ     Social History     Substance and Sexual Activity   Sexual Activity Yes    Partners: Male    Birth control/protection: Condom Male, None, Implant       Current Outpatient Medications   Medication Instructions    cyclobenzaprine (FLEXERIL) 5 mg, Oral, 3 times daily PRN    etonogestrel (NEXPLANON) 68 mg, Subcutaneous    etonogestrel (NEXPLANON) 68 mg, Subdermal, Once    ibuprofen (MOTRIN) 600 mg, Oral, Every 8 hours PRN    lidocaine (LIDODERM) 5 % 1 patch, Topical, Every 24 hours, Remove & Discard patch within 12 hours or as directed by MD    loratadine (CLARITIN) 10 mg, Oral, Daily    methocarbamol (ROBAXIN) 500 mg, Oral, 2 times daily       History of Present Illness:   Vision presents today reporting continued issues with dysuria, bladder pain with urine leakage and urinary frequency/urgency   She had reported these symptoms on 9/1/22, symptoms had been present for over one month at that time  She had a urine culture which was positive for beta hemolytic strep group B  She was treated with amoxicillin which temporarily slightly improved the symptoms  She denies any vaginal discharge, itching, irritation or odor  She has no fever, chills, n/v or flank pain  Review of Systems:  Review of Systems   Constitutional: Negative  Gastrointestinal: Negative  Genitourinary: Positive for dysuria and frequency  Negative for flank pain, hematuria, menstrual problem, urgency and vaginal discharge  Physical Exam:  /85   Pulse 92   Ht 5' 1" (1 549 m)   Wt 85 8 kg (189 lb 3 2 oz)   LMP  (LMP Unknown)   BMI 35 75 kg/m²   Physical Exam  Constitutional:       General: She is not in acute distress  Appearance: Normal appearance  Abdominal:      Tenderness: There is no right CVA tenderness or left CVA tenderness  Neurological:      Mental Status: She is alert  Skin:     General: Skin is warm and dry  Psychiatric:         Mood and Affect: Mood normal          Behavior: Behavior normal    Vitals reviewed  Point of Care Testing:   -urine dip: trace blood, negative for all other components     Assessment:   1  Dysuria     Plan:   1  Urine dip with only trace blood, negative for all other components  Will await culture  2  Referral placed for Urology due to continued issues with urine leakage, bladder pain and dysuria  3  Will call with culture results  Due for annual in one year, return sooner if needed  Reviewed with patient that [facility], but that they will not necessarily be reviewed by me immediately  Explained that I will review results at my earliest opportunity and contact patient appropriately

## 2022-09-22 LAB — BACTERIA UR CULT: NORMAL

## 2022-09-23 NOTE — RESULT ENCOUNTER NOTE
The patient's MRI shows that she has a moderate herniated disc in her lower spine  This may or may not be clinically significant and may or may not be the etiology of this patient's pain  I have not evaluated her for her back pain personally so I cannot correlate clinically  However if she is still having significant debilitating back pain, in addition to going to physical therapy I would recommend she discuss her options with a spinal neurosurgeon

## 2022-09-27 DIAGNOSIS — M54.9 BACK PAIN, UNSPECIFIED BACK LOCATION, UNSPECIFIED BACK PAIN LATERALITY, UNSPECIFIED CHRONICITY: ICD-10-CM

## 2022-09-27 DIAGNOSIS — M51.26 HERNIATED LUMBAR INTERVERTEBRAL DISC: Primary | ICD-10-CM

## 2022-09-29 ENCOUNTER — OFFICE VISIT (OUTPATIENT)
Dept: OBGYN CLINIC | Facility: CLINIC | Age: 22
End: 2022-09-29

## 2022-09-29 VITALS
DIASTOLIC BLOOD PRESSURE: 82 MMHG | HEIGHT: 61 IN | HEART RATE: 96 BPM | BODY MASS INDEX: 35.68 KG/M2 | WEIGHT: 189 LBS | SYSTOLIC BLOOD PRESSURE: 135 MMHG

## 2022-09-29 DIAGNOSIS — Z97.5 BREAKTHROUGH BLEEDING ON NEXPLANON: Primary | ICD-10-CM

## 2022-09-29 DIAGNOSIS — N92.1 BREAKTHROUGH BLEEDING ON NEXPLANON: Primary | ICD-10-CM

## 2022-09-29 PROCEDURE — 99213 OFFICE O/P EST LOW 20 MIN: CPT | Performed by: NURSE PRACTITIONER

## 2022-09-29 NOTE — PROGRESS NOTES
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[facility])     around age 4 or 4 - has EKG done every 2 years    Severe menstrual cramps     Visual impairment     Yeast infection      Past Surgical History:   Procedure Laterality Date    NO PAST SURGERIES       OB History        0    Para   0    Term   0       0    AB   0    Living   0       SAB   0    IAB   0    Ectopic   0    Multiple   0    Live Births   0               Social History     Tobacco Use    Smoking status: Never Smoker    Smokeless tobacco: Never Used   Vaping Use    Vaping Use: Former    Substances: Nicotine   Substance Use Topics    Alcohol use: Yes     Alcohol/week: 1 0 standard drink     Types: 1 Standard drinks or equivalent per week     Comment: occassional    Drug use: Never     Types: Marijuana     Comment: Occ     Social History     Substance and Sexual Activity   Sexual Activity Yes    Partners: Male    Birth control/protection: Condom Male, None, Implant       Current Outpatient Medications   Medication Instructions    cyclobenzaprine (FLEXERIL) 5 mg, Oral, 3 times daily PRN    etonogestrel (NEXPLANON) 68 mg, Subcutaneous    etonogestrel (NEXPLANON) 68 mg, Subdermal, Once    ibuprofen (MOTRIN) 600 mg, Oral, Every 8 hours PRN    lidocaine (LIDODERM) 5 % 1 patch, Topical, Every 24 hours, Remove & Discard patch within 12 hours or as directed by MD    loratadine (CLARITIN) 10 mg, Oral, Daily    methocarbamol (ROBAXIN) 500 mg, Oral, 2 times daily       History of Present Illness:   Vision presents today reporting breakthrough bleeding on nexplanon   She has had the devise in place since 5/2020  Initially she had periods lasting about 5-7 days, cycles were occurring approximately every 3-5 weeks  Most recent period started 9/11/22 and seemed to be a normal period, but bleeding then continued, she is still having light daily bleeding now  She is unhappy with the irregular bleeding pattern  Review of Systems:  Review of Systems   Constitutional: Negative  Gastrointestinal: Negative  Genitourinary: Negative  Physical Exam:  /82   Pulse 96   Ht 5' 1" (1 549 m)   Wt 85 7 kg (189 lb)   LMP 08/01/2022   BMI 35 71 kg/m²   Physical Exam  Constitutional:       General: She is not in acute distress  Appearance: Normal appearance  Neurological:      Mental Status: She is alert  Skin:     General: Skin is warm and dry  Psychiatric:         Mood and Affect: Mood normal          Behavior: Behavior normal    Vitals reviewed  Assessment:   1  Breakthrough bleeding on Nexplanon    Plan:   1  Discussed bleeding patterns associated with Nexplanon  2  Discussed options for expectant management, trail of OCPs for current bleeding episode and removal  She has had severe mood alterations with OCPs in the past and would like to avoid them  She desires removal     3  Reviewed removal procedure  4  Discussed alternative contraception options  She has discussed with her partner and they are comfortable with condom use for now  5  Discussed NSAID use to lessen current bleeding episode  6  Scheduled for removal appointment  Reviewed with patient that [facility], but that they will not necessarily be reviewed by me immediately  Explained that I will review results at my earliest opportunity and contact patient appropriately

## 2022-10-07 ENCOUNTER — PROCEDURE VISIT (OUTPATIENT)
Dept: OBGYN CLINIC | Facility: CLINIC | Age: 22
End: 2022-10-07

## 2022-10-07 VITALS
HEIGHT: 61 IN | HEART RATE: 86 BPM | WEIGHT: 190.2 LBS | BODY MASS INDEX: 35.91 KG/M2 | SYSTOLIC BLOOD PRESSURE: 119 MMHG | DIASTOLIC BLOOD PRESSURE: 77 MMHG

## 2022-10-07 DIAGNOSIS — Z30.46 NEXPLANON REMOVAL: Primary | ICD-10-CM

## 2022-10-07 PROCEDURE — 11982 REMOVE DRUG IMPLANT DEVICE: CPT | Performed by: NURSE PRACTITIONER

## 2022-10-07 NOTE — PROGRESS NOTES
Universal Protocol:  Consent: Verbal consent obtained  Risks and benefits: risks, benefits and alternatives were discussed  Consent given by: patient  Time out: Immediately prior to procedure a "time out" was called to verify the correct patient, procedure, equipment, support staff and site/side marked as required  Patient understanding: patient states understanding of the procedure being performed  Patient consent: the patient's understanding of the procedure matches consent given  Procedure consent: procedure consent matches procedure scheduled  Site marked: the operative site was marked  Patient identity confirmed: verbally with patient    Remove and insert drug implant    Date/Time: 10/7/2022 7:52 AM  Performed by: JAMIE Smith  Authorized by: JAMIE Smith     Indication:     Indication: Presence of non-biodegradable drug delivery implant    Pre-procedure:     Pre-procedure timeout performed: yes      Prepped with: povidone-iodine      Local anesthetic:  Lidocaine with epinephrine    The site was cleaned and prepped in a sterile fashion: yes    Procedure:     Procedure:  Removal    Small stab incision was made in arm: yes      Left/right:  Left    Visualization of implant was obtained: yes      Site was closed with steri-strips and pressure bandage applied: yes    Comments:      Nexplanon devise removed intact  Plans to continue with condom use

## 2022-10-10 ENCOUNTER — PATIENT OUTREACH (OUTPATIENT)
Dept: INTERNAL MEDICINE CLINIC | Facility: CLINIC | Age: 22
End: 2022-10-10

## 2022-10-10 ENCOUNTER — TELEPHONE (OUTPATIENT)
Dept: INTERNAL MEDICINE CLINIC | Facility: CLINIC | Age: 22
End: 2022-10-10

## 2022-10-10 ENCOUNTER — TELEPHONE (OUTPATIENT)
Dept: PSYCHIATRY | Facility: CLINIC | Age: 22
End: 2022-10-10

## 2022-10-10 DIAGNOSIS — F41.1 GENERALIZED ANXIETY DISORDER: Primary | ICD-10-CM

## 2022-10-10 NOTE — TELEPHONE ENCOUNTER
Patient called very anxious  She stated she felt like she had a deep pit in her chest  She stated she is avoiding talking to her therapist but feeling guilty about that  [facility]oke [facility] and will follow up

## 2022-10-10 NOTE — TELEPHONE ENCOUNTER
Pt is having a hard time functioning, very depressed, over whelemed  Please reach out asap  Implant birthcontrol removed

## 2022-10-10 NOTE — PROGRESS NOTES
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  She is taking a few moments to call here because she was not sure what to do  SW has listened to her concerns and has offered emotional support  Pt shares she has alot going on and feels it is very situational     She shares she is feeling very depressed, lonely and is finding it hard to function due to her stress and anxiety   Pt has hx of panic attacks  She denies any SI/HI or plans  Pt does have a therapist through [address] and shares she has been avoiding her therapist because it was too hard to tell her what is going on  Pt shared she felt guilt re same  SW did encouraged speaking to her Therapist can really help and pt was receptive SW also suggested she may benefit from speaking to a psychiatrist because a medical evaluation and or medication may help with these symptoms  Pt share she has been doing virtual visits but is open to in person or virtual       Pt share she is staying at [address] [facility] and not with her Mother  She has not seen her Father for awhile   Pt shared she has had previous ED visits for same  SW has encouraged out reach to her Therapist, provided Crisis # as well encouraged pt to return to the ED if symptoms worsens  Pt shares she has recently had an implanted Birth Contol method removed  Pt does know if this was also affecting her   SW has reached out to [employer] [phone] and spoke to Kev Ayala  She will place an URGENT request to her Provider Ms Denton Parr and her scheduling team member to reach out to pt ASAP  Pt has agreed to F/U with SW about how she is doing and if she needs any additional help

## 2022-10-10 NOTE — TELEPHONE ENCOUNTER
Called Patient left a message to call our office to scheduled follow up appointment with Demario Bhatia

## 2022-10-11 ENCOUNTER — OFFICE VISIT (OUTPATIENT)
Dept: URGENT CARE | Age: 22
End: 2022-10-11
Payer: MEDICARE

## 2022-10-11 VITALS
HEART RATE: 103 BPM | TEMPERATURE: 97.9 F | RESPIRATION RATE: 18 BRPM | DIASTOLIC BLOOD PRESSURE: 76 MMHG | SYSTOLIC BLOOD PRESSURE: 112 MMHG | OXYGEN SATURATION: 98 %

## 2022-10-11 DIAGNOSIS — K13.0 CHEILITIS DUE TO ATOPIC DERMATITIS: Primary | ICD-10-CM

## 2022-10-11 DIAGNOSIS — L20.9 CHEILITIS DUE TO ATOPIC DERMATITIS: Primary | ICD-10-CM

## 2022-10-11 PROCEDURE — 99213 OFFICE O/P EST LOW 20 MIN: CPT

## 2022-10-11 RX ORDER — CLOBETASOL PROPIONATE 0.5 MG/G
OINTMENT TOPICAL 2 TIMES DAILY
Qty: 30 G | Refills: 0 | Status: SHIPPED | OUTPATIENT
Start: 2022-10-11

## 2022-10-11 NOTE — PROGRESS NOTES
[facility]        NAME: Sajan Smith is a 25 y o  female  : 2000    MRN: [id number]  DATE: 2022  TIME: 2:24 PM    Assessment and Plan   Cheilitis due to atopic dermatitis [K13 0, L20 9]  1  Cheilitis due to atopic dermatitis  clobetasol (TEMOVATE) 0 05 % ointment     Patient presents with complaints of itchy lips  She states she has eczema and it appears the same  Has tried benadryl with some relief  No drainage or blisters noted  Denies new cosmetics or products, denies trouble breathing or diff swallowing    Assessment notes dry, urticarial rash to bilateral upper and lower lips  No vesicles, crusting or drainage  Will treat as a atopic dermatitis  Patient Instructions       Follow up with PCP as needed    Chief Complaint     Chief Complaint   Patient presents with   • Itching     Pt c/o facial and lip itching  Pt states had red bumps on face 4 days  ago took benadryl and went away  History of Present Illness       Patient presents with complaints of itchy lips  She states she has eczema and it appears the same  Has tried benadryl with some relief  No drainage or blisters noted  Denies new cosmetics or products,     Assessment notes dry, urticarial rash to bilateral upper and lower lips  No vesicles, crusting or drainage  Will treat as a atopic dermatitis  Review of Systems   Review of Systems   Constitutional: Negative for chills and fever  HENT: Negative for congestion, ear pain, postnasal drip, sinus pain and sore throat  Eyes: Negative for pain and itching  Respiratory: Negative for cough, shortness of breath and wheezing  Cardiovascular: Negative for chest pain and palpitations  Gastrointestinal: Negative for abdominal pain, constipation, diarrhea, nausea and vomiting  Genitourinary: Negative for difficulty urinating and hematuria  Musculoskeletal: Negative for arthralgias and myalgias  Skin: Positive for rash     Neurological: Negative for dizziness, light-headedness and headaches  Psychiatric/Behavioral: Negative for agitation and sleep disturbance  The patient is not nervous/anxious            Current Medications       Current Outpatient Medications:   •  clobetasol (TEMOVATE) 0 05 % ointment, Apply topically 2 (two) times a day, Disp: 30 g, Rfl: 0  •  loratadine (CLARITIN) 10 mg tablet, Take 1 tablet (10 mg total) by mouth daily, Disp: 30 tablet, Rfl: 3  •  cyclobenzaprine (FLEXERIL) 10 mg tablet, Take 0 5 tablets (5 mg total) by mouth 3 (three) times a day as needed for muscle spasms (Patient not taking: No sig reported), Disp: 20 tablet, Rfl: 0  •  etonogestrel (NEXPLANON) subdermal implant, Inject 68 mg under the skin (Patient not taking: Reported on 10/11/2022), Disp: , Rfl:   •  ibuprofen (MOTRIN) 600 mg tablet, Take 1 tablet (600 mg total) by mouth every 8 (eight) hours as needed for moderate pain, Disp: 30 tablet, Rfl: 0  •  lidocaine (LIDODERM) 5 %, Apply 1 patch topically every 24 hours for 7 days Remove & Discard patch within 12 hours or as directed by MD, Disp: 7 patch, Rfl: 0  •  methocarbamol (ROBAXIN) 500 mg tablet, Take 1 tablet (500 mg total) by mouth 2 (two) times a day for 7 days, Disp: 14 tablet, Rfl: 0    Current Allergies     Allergies as of 10/11/2022   • (No Known Allergies)            The following portions of the patient's history were reviewed and updated as appropriate: allergies, current medications, past family history, past medical history, past social history, past surgical history and problem list      Past Medical History:   Diagnosis Date   • Anxiety    • Depression 02/01/2022   • FTND (full term normal delivery) 2000   • Herniated disc, cervical     9/2022   • 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[address] )     around age 3 or 4 - has EKG done every 2 years   • Severe menstrual cramps    • Visual impairment    • Yeast infection        Past Surgical History:   Procedure Laterality Date   • NO PAST SURGERIES         Family History   Problem Relation Age of Onset   • No Known Problems Mother    • No Known Problems Father    • No Known Problems Sister    • No Known Problems Brother    • No Known Problems Sister    • No Known Problems Sister    • No Known Problems Brother    • No Known Problems Brother    • Cancer Family         colon   • Breast cancer Paternal Grandmother    • Diabetes Paternal Grandmother    • Hypertension Maternal Grandmother          Medications have been verified  Objective   /76   Pulse 103   Temp 97 9 °F (36 6 °C) (Tympanic)   Resp 18   LMP  (LMP Unknown)   SpO2 98%   No LMP recorded (lmp unknown)  Patient has had an implant  Physical Exam     Physical Exam  Vitals reviewed  Constitutional:       General: She is not in acute distress  Appearance: Normal appearance  She is not ill-appearing  HENT:      Head: Normocephalic and atraumatic  Eyes:      Extraocular Movements: Extraocular movements intact  Conjunctiva/sclera: Conjunctivae normal    Pulmonary:      Effort: Pulmonary effort is normal    Skin:     General: Skin is warm  Findings: Rash present  Neurological:      General: No focal deficit present  Mental Status: She is alert     Psychiatric:         Mood and Affect: Mood normal          Behavior: Behavior normal          Judgment: Judgment normal

## 2022-10-13 ENCOUNTER — TELEMEDICINE (OUTPATIENT)
Dept: BEHAVIORAL/MENTAL HEALTH CLINIC | Facility: CLINIC | Age: 22
End: 2022-10-13
Payer: COMMERCIAL

## 2022-10-13 DIAGNOSIS — Z30.017 NEXPLANON INSERTION: Primary | ICD-10-CM

## 2022-10-13 DIAGNOSIS — F41.1 GENERALIZED ANXIETY DISORDER: ICD-10-CM

## 2022-10-13 PROCEDURE — 90834 PSYTX W PT 45 MINUTES: CPT | Performed by: COUNSELOR

## 2022-10-13 NOTE — PSYCH
Visit Time    Visit Start Time: 2:02 PM  Visit Stop Time: 2:49 PM  Total Visit Duration: 47 minutes  Virtual Regular Visit    Verification of patient location:    Patient is located in the following state in which I hold an active license [place]      Assessment/Plan:    Problem List Items Addressed This Visit        Other    Nexplanon insertion - Primary    Generalized anxiety disorder          Goals addressed in session: Goal 1 and Goal 2          Reason for visit is No chief complaint on file  Encounter provider Karlie Vincent    Provider located at [address] [place] [id number] [phone]      Recent Visits  Date Type Provider Dept   10/10/22 Telephone Gerald 12   10/10/22 Telephone Bashirrussjusto Carranza, [address] [facility] recent visits within past 7 days and meeting all other requirements  Future Appointments  No visits were found meeting these conditions  Showing future appointments within next 150 days and meeting all other requirements       The patient was identified by name and date of birth  Vision Greg Wong was informed that this is a telemedicine visit and that the visit is being conducted throughic Embedded and patient was informed this is a secure, HIPAA-complaint platform  She agrees to proceed  My office door was closed  No one else was in the room  She acknowledged consent and understanding of privacy and security of the video platform  The patient has agreed to participate and understands they can discontinue the visit at any time  Patient is aware this is a billable service  Subjective  Vision Juarez Olivas is a 25 y o  female     D: Clinician met with Vision via telehealth for individual therapy  Vision processed recent breakup from paramour in the last 24 hours   She reports feeling overwhelmed and emotional as she processed this situation  She processed through their conversation and his decision to end things due to continued emotional infidelity by checking back in with ex paramours  Clinician listened with unconditional positive regard and support and discussed ways to self sooth and cope with extreme emotions  A: Vision was tearful throughout session  She reports begin hopeless and increase sadness and feeling overwhelmed  She denies SI/HI plan or intent and was able to contract for safety if thoughts arise  P: Continue to meet with Vision weekly for individual therapy         HPI     Past Medical History:   Diagnosis Date   • Anxiety    • Depression 02/01/2022   • FTND (full term normal delivery) 2000   • Herniated disc, cervical     9/2022   • 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[address] )     around age 3 or 4 - has EKG done every 2 years   • Severe menstrual cramps    • Visual impairment    • Yeast infection        Past Surgical History:   Procedure Laterality Date   • NO PAST SURGERIES         Current Outpatient Medications   Medication Sig Dispense Refill   • clobetasol (TEMOVATE) 0 05 % ointment Apply topically 2 (two) times a day 30 g 0   • cyclobenzaprine (FLEXERIL) 10 mg tablet Take 0 5 tablets (5 mg total) by mouth 3 (three) times a day as needed for muscle spasms (Patient not taking: No sig reported) 20 tablet 0   • etonogestrel (NEXPLANON) subdermal implant Inject 68 mg under the skin (Patient not taking: Reported on 10/11/2022)     • ibuprofen (MOTRIN) 600 mg tablet Take 1 tablet (600 mg total) by mouth every 8 (eight) hours as needed for moderate pain 30 tablet 0   • lidocaine (LIDODERM) 5 % Apply 1 patch topically every 24 hours for 7 days Remove & Discard patch within 12 hours or as directed by MD 7 patch 0   • loratadine (CLARITIN) 10 mg tablet Take 1 tablet (10 mg total) by mouth daily 30 tablet 3   • methocarbamol (ROBAXIN) 500 mg tablet Take 1 tablet (500 mg total) by mouth 2 (two) times a day for 7 days 14 tablet 0 No current facility-administered medications for this visit  No Known Allergies    Review of Systems    Video Exam    There were no vitals filed for this visit      Physical Exam     I spent 47 minutes directly with the patient during this visit

## 2022-10-14 ENCOUNTER — TELEMEDICINE (OUTPATIENT)
Dept: BEHAVIORAL/MENTAL HEALTH CLINIC | Facility: CLINIC | Age: 22
End: 2022-10-14
Payer: COMMERCIAL

## 2022-10-14 ENCOUNTER — PATIENT OUTREACH (OUTPATIENT)
Dept: INTERNAL MEDICINE CLINIC | Facility: CLINIC | Age: 22
End: 2022-10-14

## 2022-10-14 DIAGNOSIS — F41.1 GENERALIZED ANXIETY DISORDER: ICD-10-CM

## 2022-10-14 DIAGNOSIS — F32.A DEPRESSION, UNSPECIFIED DEPRESSION TYPE: Primary | ICD-10-CM

## 2022-10-14 PROCEDURE — 90832 PSYTX W PT 30 MINUTES: CPT | Performed by: COUNSELOR

## 2022-10-14 NOTE — PSYCH
Virtual Regular Visit    Visit Time    Visit Start Time: 2:00 PM  Visit Stop Time: 2:20 PM  Total Visit Duration: 20 minutes    Verification of patient location:    Patient is located in the following state in which I hold an active license [place]      Assessment/Plan:    Problem List Items Addressed This Visit        Other    Generalized anxiety disorder    Depression - Primary          Goals addressed in session: Goal 1 and Goal 2          Reason for visit is No chief complaint on file  Encounter provider Gabino Fleming    Provider located at [address] [place] [id number] [phone]      Recent Visits  Date Type Provider Dept   10/13/22 310 [facility]   10/10/22 Telephone [address]   10/10/22 Telephone Almaz Carranza, [address] [facility] recent visits within past 7 days and meeting all other requirements  Future Appointments  No visits were found meeting these conditions  Showing future appointments within next 150 days and meeting all other requirements       The patient was identified by name and date of birth  Vision Greg Barrow was informed that this is a telemedicine visit and that the visit is being conducted throughEpic Embedded and patient was informed this is a secure, HIPAA-complaint platform  She agrees to proceed  My office door was closed  No one else was in the room  She acknowledged consent and understanding of privacy and security of the video platform  The patient has agreed to participate and understands they can discontinue the visit at any time  Patient is aware this is a billable service  Subjective  Vision Dimitry Mendzoa is a 25 y o  female     D: Clinician met with Vision via telehealth for individual therapy   Clinician scheduled follow up after speaking with Vision the day before and her expressing high emotions over a recent break up  Vision discussed has she was able to cope with emotions after the appointment and reports that she has spoken to her ex paramour more about future and is feeling better about situation  Clinician discussed nature of high intensity emotions being temporary and importance of skills to self sooth  Vision processed family stressors that have been coming up lately making her more stressed and working on greater support within current nursing program as well as well established friends  Clinician praised this and use of self care  A: Vision presented with stable mood and reports that she is feeling stable and better able to manager her emotions  Clinician went over possible community resources if needed in the future  P: Continue to meet with Vision weekly for individual therapy         HPI     Past Medical History:   Diagnosis Date   • Anxiety    • Depression 02/01/2022   • FTND (full term normal delivery) 2000   • Herniated disc, cervical     9/2022   • 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[address] )     around age 3 or 4 - has EKG done every 2 years   • Severe menstrual cramps    • Visual impairment    • Yeast infection        Past Surgical History:   Procedure Laterality Date   • NO PAST SURGERIES         Current Outpatient Medications   Medication Sig Dispense Refill   • clobetasol (TEMOVATE) 0 05 % ointment Apply topically 2 (two) times a day 30 g 0   • cyclobenzaprine (FLEXERIL) 10 mg tablet Take 0 5 tablets (5 mg total) by mouth 3 (three) times a day as needed for muscle spasms (Patient not taking: No sig reported) 20 tablet 0   • etonogestrel (NEXPLANON) subdermal implant Inject 68 mg under the skin (Patient not taking: Reported on 10/11/2022)     • ibuprofen (MOTRIN) 600 mg tablet Take 1 tablet (600 mg total) by mouth every 8 (eight) hours as needed for moderate pain 30 tablet 0   • lidocaine (LIDODERM) 5 % Apply 1 patch topically every 24 hours for 7 days Remove & Discard patch within 12 hours or as directed by MD 7 patch 0   • loratadine (CLARITIN) 10 mg tablet Take 1 tablet (10 mg total) by mouth daily 30 tablet 3   • methocarbamol (ROBAXIN) 500 mg tablet Take 1 tablet (500 mg total) by mouth 2 (two) times a day for 7 days 14 tablet 0     No current facility-administered medications for this visit  No Known Allergies    Review of Systems    Video Exam    There were no vitals filed for this visit      Physical Exam     I spent 20 minutes directly with the patient during this visit

## 2022-10-14 NOTE — PROGRESS NOTES
SW has reached out to pt this date as f/u  Pt shares she is feeling better today and has been able to connect with her therapist   Support and encouragement provided  Pt denies need of further SW intervention at present but will reach out if needed

## 2022-10-21 ENCOUNTER — TELEMEDICINE (OUTPATIENT)
Dept: BEHAVIORAL/MENTAL HEALTH CLINIC | Facility: CLINIC | Age: 22
End: 2022-10-21
Payer: COMMERCIAL

## 2022-10-21 DIAGNOSIS — F41.1 GENERALIZED ANXIETY DISORDER: Primary | ICD-10-CM

## 2022-10-21 DIAGNOSIS — F32.A DEPRESSION, UNSPECIFIED DEPRESSION TYPE: ICD-10-CM

## 2022-10-21 PROCEDURE — 90832 PSYTX W PT 30 MINUTES: CPT | Performed by: COUNSELOR

## 2022-10-21 NOTE — PSYCH
Virtual Regular Visit    Verification of patient location:    Patient is located in the following state in which I hold an active license [place]      Assessment/Plan:    Problem List Items Addressed This Visit        Other    Generalized anxiety disorder - Primary    Depression          Goals addressed in session: Goal 1 and Goal 2          Reason for visit is No chief complaint on file  Encounter provider Keke Adair    Provider located at [address] [place] [id number] [phone]      Recent Visits  Date Type Provider Dept   10/14/22 310 [facility]   Showing recent visits within past 7 days and meeting all other requirements  Future Appointments  No visits were found meeting these conditions  Showing future appointments within next 150 days and meeting all other requirements       The patient was identified by name and date of birth  Vision Greg Turner was informed that this is a telemedicine visit and that the visit is being conducted throughthe [facility]  She agrees to proceed     My office door was closed  No one else was in the room  She acknowledged consent and understanding of privacy and security of the video platform  The patient has agreed to participate and understands they can discontinue the visit at any time  Patient is aware this is a billable service  Subjective  Vision Toribio Patterson is a 25 y o  female     D: Clinician met with Vision via telehealth for individual therapy  Vision reports that her mood has been stable over the last week  Clinician explored resources that Vision can utilize in times of need during high stress or when feeling emotionally overwhelmed  Vision was able to identify a few close friends, peers within her program and professional supports   Clinician praised and encouraged her to reach out when needed and to advocate for help  Vision discussed stress related to schooling and working to focus on her studies in order to get good grades  She reports that overall she has been doing well and is developing a better schedule for school and work which is better balanced  Clinician processed benefits of this and importance of self care  A: Vision presented with stable mood and affect and was engaged in the session  She was easily able to identify supports outside of her family unit and process benefits of asking for support  P: Continue to meet with Vision every other week for individual therapy        HPI     Past Medical History:   Diagnosis Date   • Anxiety    • Depression 02/01/2022   • FTND (full term normal delivery) 2001   • Herniated disc, cervical     9/2022   • 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[address] )     around age 3 or 4 - has EKG done every 2 years   • Severe menstrual cramps    • Visual impairment    • Yeast infection        Past Surgical History:   Procedure Laterality Date   • NO PAST SURGERIES         Current Outpatient Medications   Medication Sig Dispense Refill   • clobetasol (TEMOVATE) 0 05 % ointment Apply topically 2 (two) times a day 30 g 0   • cyclobenzaprine (FLEXERIL) 10 mg tablet Take 0 5 tablets (5 mg total) by mouth 3 (three) times a day as needed for muscle spasms (Patient not taking: No sig reported) 20 tablet 0   • etonogestrel (NEXPLANON) subdermal implant Inject 68 mg under the skin (Patient not taking: Reported on 10/11/2022)     • ibuprofen (MOTRIN) 600 mg tablet Take 1 tablet (600 mg total) by mouth every 8 (eight) hours as needed for moderate pain 30 tablet 0   • lidocaine (LIDODERM) 5 % Apply 1 patch topically every 24 hours for 7 days Remove & Discard patch within 12 hours or as directed by MD 7 patch 0   • loratadine (CLARITIN) 10 mg tablet Take 1 tablet (10 mg total) by mouth daily 30 tablet 3   • methocarbamol (ROBAXIN) 500 mg tablet Take 1 tablet (500 mg total) by mouth 2 (two) times a day for 7 days 14 tablet 0     No current facility-administered medications for this visit  No Known Allergies    Review of Systems    Video Exam    There were no vitals filed for this visit      Physical Exam     I spent 27 minutes directly with the patient during this visit     Visit Time    Visit Start Time: 12:00 PM  Visit Stop Time: 12:27 PM  Total Visit Duration: 27 minutes

## 2022-11-04 ENCOUNTER — TELEMEDICINE (OUTPATIENT)
Dept: BEHAVIORAL/MENTAL HEALTH CLINIC | Facility: CLINIC | Age: 22
End: 2022-11-04

## 2022-11-04 DIAGNOSIS — F32.A DEPRESSION, UNSPECIFIED DEPRESSION TYPE: ICD-10-CM

## 2022-11-04 DIAGNOSIS — F41.1 GENERALIZED ANXIETY DISORDER: Primary | ICD-10-CM

## 2022-11-04 NOTE — PSYCH
Virtual Regular Visit    Verification of patient location:    Patient is located in the following state in which I hold an active license [place]      Assessment/Plan:    Problem List Items Addressed This Visit        Other    Generalized anxiety disorder - Primary    Depression          Goals addressed in session: Goal 1 and Goal 2          Reason for visit is No chief complaint on file  Encounter provider Mimi Mcmahan    Provider located at [address] [place] [id number] [phone]      Recent Visits  Date Type Provider Dept   11/04/22 310 [facility]   Showing recent visits within past 7 days and meeting all other requirements  Future Appointments  No visits were found meeting these conditions  Showing future appointments within next 150 days and meeting all other requirements       The patient was identified by name and date of birth  Vision Greg Salmon was informed that this is a telemedicine visit and that the visit is being conducted through[address]  She agrees to proceed     My office door was closed  No one else was in the room  She acknowledged consent and understanding of privacy and security of the video platform  The patient has agreed to participate and understands they can discontinue the visit at any time  Patient is aware this is a billable service  Subjective  Vision Ramy Lema is a 25 y o  female     D: Clinician met with Vision via telehealth for individual therapy  Vision processed stress related to school and stated that current semester has been the hardest so far  Clinician validated difficultness and praised her follow through  Vision discussed working to better manage her time and increase self care in the form of the gym on a regular basis   She also reports reaching out for more support from other peers since beginning of the semester and the benefits of this as they are also struggling with the course work  She discussed work/school/home life balance and plans for self care over the weekend after work  Clinician encouraged self care and taking time for herself when needed  A: Vision presented with stable mood and affect and was engaged in the session  She reports prioritizing her mental health has been helpful and setting better boundaries with her mother has decreased stress overall  P: Continue to meet with Vision every other week for individual therapy         HPI     Past Medical History:   Diagnosis Date   • Anxiety    • Depression 02/01/2022   • FTND (full term normal delivery) 2001   • Herniated disc, cervical     9/2022   • 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[address] )     around age 3 or 4 - has EKG done every 2 years   • Severe menstrual cramps    • Visual impairment    • Yeast infection        Past Surgical History:   Procedure Laterality Date   • NO PAST SURGERIES         Current Outpatient Medications   Medication Sig Dispense Refill   • clobetasol (TEMOVATE) 0 05 % ointment Apply topically 2 (two) times a day 30 g 0   • cyclobenzaprine (FLEXERIL) 10 mg tablet Take 0 5 tablets (5 mg total) by mouth 3 (three) times a day as needed for muscle spasms (Patient not taking: No sig reported) 20 tablet 0   • etonogestrel (NEXPLANON) subdermal implant Inject 68 mg under the skin (Patient not taking: Reported on 10/11/2022)     • ibuprofen (MOTRIN) 600 mg tablet Take 1 tablet (600 mg total) by mouth every 8 (eight) hours as needed for moderate pain 30 tablet 0   • lidocaine (LIDODERM) 5 % Apply 1 patch topically every 24 hours for 7 days Remove & Discard patch within 12 hours or as directed by MD 7 patch 0   • loratadine (CLARITIN) 10 mg tablet Take 1 tablet (10 mg total) by mouth daily 30 tablet 3   • methocarbamol (ROBAXIN) 500 mg tablet Take 1 tablet (500 mg total) by mouth 2 (two) times a day for 7 days 14 tablet 0     No current facility-administered medications for this visit  No Known Allergies    Review of Systems    Video Exam    There were no vitals filed for this visit      Physical Exam     Visit Time    Visit Start Time: 1:01 pm  Visit Stop Time: 1:42 pm   Total Visit Duration: 41 minutes

## 2022-11-08 ENCOUNTER — OFFICE VISIT (OUTPATIENT)
Dept: NEUROSURGERY | Facility: CLINIC | Age: 22
End: 2022-11-08

## 2022-11-08 VITALS
HEART RATE: 75 BPM | DIASTOLIC BLOOD PRESSURE: 92 MMHG | BODY MASS INDEX: 36.44 KG/M2 | SYSTOLIC BLOOD PRESSURE: 140 MMHG | TEMPERATURE: 97.8 F | WEIGHT: 193 LBS | HEIGHT: 61 IN | RESPIRATION RATE: 16 BRPM

## 2022-11-08 DIAGNOSIS — M51.26 HERNIATED LUMBAR INTERVERTEBRAL DISC: ICD-10-CM

## 2022-11-08 DIAGNOSIS — M54.9 BACK PAIN, UNSPECIFIED BACK LOCATION, UNSPECIFIED BACK PAIN LATERALITY, UNSPECIFIED CHRONICITY: ICD-10-CM

## 2022-11-08 NOTE — PROGRESS NOTES
Neurosurgery Office Note  Jules Brennan [birth year] 25 y o  female MRN: [id number]      Assessment/Plan     Herniated lumbar intervertebral disc  Patient seen for new evaluation of LBP with BLE radiculopathy  · Has had LBP since 2019  Denies injury or trauma  Every few months has flares of pain in the low back that radiates to bilateral buttocks and posterior thighs, calves and bottoms of feet  Currently LBP is 2-3/10, no radicular symptoms  No BBI, but has some urinary urgency  · Tried PT in the past with some relief  No pain management  · Exam: midline low back spinal tenderness to palpation  BLE 5/5 throughout, sensation intact, 2+ DTR, no clonus  Imaging:  · MRI lumbar spine wo 9/18/22: Moderate size central broad-based protrusion at L5-S1 results in mild central stenosis  · Lumbar spine xray 8/22/22: No acute osseous abnormalities  Plan:  · Reviewed imaging with patient and Dr Max Sanchez  · She does have a moderate sized disc herniation at L5-S1, though not having a current flare in her symptoms  Would not offer surgery at this time  · Continue PT  Consider pain management referral  She would like to hold off on this for now  · Follow up with GYN regarding consideration for breast reduction  · Consider urology for urinary urgency  · Reviewed spine precautions to help protect her back when lifting  · Reviewed red flag s/s  · Follow up as needed  Call with any questions or concerns  Diagnoses and all orders for this visit:    Herniated lumbar intervertebral disc  -     Ambulatory Referral to Neurosurgery    Back pain, unspecified back location, unspecified back pain laterality, unspecified chronicity  -     Ambulatory Referral to Neurosurgery          I spent 40 minutes with the patient today in which >50% of the time was spent counseling/coordination of care regarding diagnosis, imaging review, symptoms and treatment plan       CHIEF COMPLAINT    Chief Complaint   Patient presents with   • Consult       HISTORY    History of Present Illness     25y o  year old female     25-year-old female seen today for new evaluation low back pain with bilateral lower extremity radiculopathy  Her low back pain started in 2019 after a trip to [place]  Thought she overused her body by walking and site seeing  The low back pain was so bad that she can hardly walk  Within the last year her back pain has worsened and every few months will have a flare-up  When she has a flare-up she has low back pain radiating into bilateral buttocks, down the posterior thighs and calves into the bottoms of her feet  Usually the right leg is worse than the left in terms of pain  She also has associated numbness and tingling in those areas  During the flare-ups of pain as a 10/10  Currently she is feeling okay  Right now she describes the low back pain as uncomfortable or a tightness/pressure that is a 3/10  She has no radicular symptoms at present, that only happens when she has flare-ups  Ibuprofen helps with her pain  However when the flare-ups happened nothing really helps the pain  During her last flare-up in August 2022, she went to the chiropractor and that helped her pain  She was told that her pelvis is tilted anteriorly  No bowel or bladder incontinence but she does note urinary urgency  Tried PT which helped in the past, but 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  She does try to go to the gym  Has not seen pain management  See Discussion    REVIEW OF SYSTEMS    Review of Systems   HENT: Negative for tinnitus  Eyes: Negative for visual disturbance  Respiratory: Negative for shortness of breath and wheezing  Cardiovascular: Negative for chest pain  Gastrointestinal: Negative  Genitourinary: Positive for urgency (urine accidents)     Musculoskeletal: Positive for back pain (lower back pain was radiating bilateral legs has subsided unless she has a flare up ) and myalgias (tightness and pressure in her lower back almost an everyday thing )  Negative for gait problem, neck pain and neck stiffness  Back pain for years but worse within this past year     PT on and off since last year   No injection for her back   Was seeing a chiropractor , last appt was 08/2022 with relief        Neurological: Positive for headaches (bad recently , maybe stress related or withdrawl from coming off of birth control )  Negative for dizziness, tremors (sometimes maybe from being anxious ), seizures, weakness (in her legs when she has a flare up from her back pain ) and numbness (was having in her feet has subsided unless she is having a flare up )  Meds/Allergies     Current Outpatient Medications   Medication Sig Dispense Refill   • loratadine (CLARITIN) 10 mg tablet Take 1 tablet (10 mg total) by mouth daily 30 tablet 3   • clobetasol (TEMOVATE) 0 05 % ointment Apply topically 2 (two) times a day 30 g 0   • cyclobenzaprine (FLEXERIL) 10 mg tablet Take 0 5 tablets (5 mg total) by mouth 3 (three) times a day as needed for muscle spasms (Patient not taking: No sig reported) 20 tablet 0   • etonogestrel (NEXPLANON) subdermal implant Inject 68 mg under the skin     • ibuprofen (MOTRIN) 600 mg tablet Take 1 tablet (600 mg total) by mouth every 8 (eight) hours as needed for moderate pain (Patient not taking: Reported on 11/8/2022) 30 tablet 0   • lidocaine (LIDODERM) 5 % Apply 1 patch topically every 24 hours for 7 days Remove & Discard patch within 12 hours or as directed by MD 7 patch 0   • methocarbamol (ROBAXIN) 500 mg tablet Take 1 tablet (500 mg total) by mouth 2 (two) times a day for 7 days (Patient not taking: Reported on 11/8/2022) 14 tablet 0     No current facility-administered medications for this visit         No Known Allergies    PAST HISTORY    Past Medical History:   Diagnosis Date   • Anxiety    • Depression 02/01/2022   • FTND (full term normal delivery) 2001   • Herniated disc, cervical     9/2022   • 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 - has EKG done every 2 years   • Severe menstrual cramps    • Visual impairment    • Yeast infection        Past Surgical History:   Procedure Laterality Date   • NO PAST SURGERIES         Social History     Tobacco Use   • Smoking status: Never Smoker   • Smokeless tobacco: Never Used   Vaping Use   • Vaping Use: Former   • Substances: Nicotine   Substance Use Topics   • Alcohol use: Yes     Alcohol/week: 1 0 standard drink     Types: 1 Standard drinks or equivalent per week     Comment: occassional   • Drug use: Never     Types: Marijuana     Comment: Occ       Family History   Problem Relation Age of Onset   • No Known Problems Mother    • No Known Problems Father    • No Known Problems Sister    • No Known Problems Brother    • No Known Problems Sister    • No Known Problems Sister    • No Known Problems Brother    • No Known Problems Brother    • Cancer Family         colon   • Breast cancer Paternal Grandmother    • Diabetes Paternal Grandmother    • Hypertension Maternal Grandmother          Above history personally reviewed  EXAM    Vitals:Blood pressure 140/92, pulse 75, temperature 97 8 °F (36 6 °C), temperature source Temporal, resp  rate 16, height 5' 1" (1 549 m), weight 87 5 kg (193 lb), not currently breastfeeding  ,Body mass index is 36 47 kg/m²  Physical Exam  Vitals reviewed  Constitutional:       General: She is awake  Appearance: Normal appearance  HENT:      Head: Normocephalic and atraumatic  Eyes:      Conjunctiva/sclera: Conjunctivae normal    Cardiovascular:      Rate and Rhythm: Normal rate  Pulmonary:      Effort: Pulmonary effort is normal    Musculoskeletal:         General: Tenderness present  Comments: Low back midline spine tenderness to palpation    Skin:     General: Skin is warm and dry     Neurological:      Mental Status: She is alert and oriented to person, place, and time       Gait: Gait is intact  Deep Tendon Reflexes:      Reflex Scores:       Patellar reflexes are 2+ on the right side and 2+ on the left side  Psychiatric:         Attention and Perception: Attention and perception normal          Mood and Affect: Mood and affect normal          Speech: Speech normal          Behavior: Behavior normal  Behavior is cooperative  Thought Content: Thought content normal          Cognition and Memory: Cognition and memory normal          Judgment: Judgment normal          Neurologic Exam     Mental Status   Oriented to person, place, and time  Follows 2 step commands  Attention: normal  Concentration: normal    Speech: speech is normal   Knowledge: good  Normal comprehension  Motor Exam     Strength   Right iliopsoas: 5/5  Left iliopsoas: 5/5  Right quadriceps: 5/5  Left quadriceps: 5/5  Right hamstrin/5  Left hamstrin/5  Right anterior tibial: 5/5  Left anterior tibial: 5/5  Right gastroc: 5/5  Left gastroc: 5/5    Sensory Exam   Right leg light touch: normal  Left leg light touch: normal    Gait, Coordination, and Reflexes     Gait  Gait: normal    Reflexes   Right patellar: 2+  Left patellar: 2+  Right ankle clonus: absent  Left ankle clonus: absent        MEDICAL DECISION MAKING    Imaging Studies:     No results found  I have personally reviewed pertinent reports     and I have personally reviewed pertinent films in PACS

## 2022-11-08 NOTE — ASSESSMENT & PLAN NOTE
Patient seen for new evaluation of LBP with BLE radiculopathy  · Has had LBP since 2019  Denies injury or trauma  Every few months has flares of pain in the low back that radiates to bilateral buttocks and posterior thighs, calves and bottoms of feet  Currently LBP is 2-3/10, no radicular symptoms  No BBI, but has some urinary urgency  · Tried PT in the past with some relief  No pain management  · Exam: midline low back spinal tenderness to palpation  BLE 5/5 throughout, sensation intact, 2+ DTR, no clonus  Imaging:  · MRI lumbar spine wo 9/18/22: Moderate size central broad-based protrusion at L5-S1 results in mild central stenosis  · Lumbar spine xray 8/22/22: No acute osseous abnormalities  Plan:  · Reviewed imaging with patient and Dr Sandhya Fulton  · She does have a moderate sized disc herniation at L5-S1, though not having a current flare in her symptoms  Would not offer surgery at this time  · Continue PT  Consider pain management referral  She would like to hold off on this for now  · Follow up with GYN regarding consideration for breast reduction  · Consider urology for urinary urgency  · Reviewed spine precautions to help protect her back when lifting  · Reviewed red flag s/s  · Follow up as needed  Call with any questions or concerns

## 2022-11-18 ENCOUNTER — TELEPHONE (OUTPATIENT)
Dept: PSYCHIATRY | Facility: CLINIC | Age: 22
End: 2022-11-18

## 2022-11-18 NOTE — TELEPHONE ENCOUNTER
NO-SHOW LETTER MAILED TO Jules Sykes    ADDRESS: [place] [address]   [address] [facility] [address]

## 2022-11-28 ENCOUNTER — OFFICE VISIT (OUTPATIENT)
Dept: OBGYN CLINIC | Facility: CLINIC | Age: 22
End: 2022-11-28

## 2022-11-28 VITALS
SYSTOLIC BLOOD PRESSURE: 131 MMHG | DIASTOLIC BLOOD PRESSURE: 85 MMHG | HEIGHT: 61 IN | WEIGHT: 196 LBS | HEART RATE: 91 BPM | BODY MASS INDEX: 37 KG/M2

## 2022-11-28 DIAGNOSIS — N76.0 BV (BACTERIAL VAGINOSIS): ICD-10-CM

## 2022-11-28 DIAGNOSIS — N76.0 VAGINAL INFECTION: Primary | ICD-10-CM

## 2022-11-28 DIAGNOSIS — B96.89 BV (BACTERIAL VAGINOSIS): ICD-10-CM

## 2022-11-28 LAB
BV WHIFF TEST VAG QL: NEGATIVE
CLUE CELLS SPEC QL WET PREP: POSITIVE
PH SMN: 6 [PH]
SL AMB  POCT GLUCOSE, UA: NORMAL
SL AMB LEUKOCYTE ESTERASE,UA: NORMAL
SL AMB POCT BILIRUBIN,UA: NORMAL
SL AMB POCT BLOOD,UA: NORMAL
SL AMB POCT CLARITY,UA: CLEAR
SL AMB POCT COLOR,UA: YELLOW
SL AMB POCT KETONES,UA: NORMAL
SL AMB POCT NITRITE,UA: NORMAL
SL AMB POCT PH,UA: 8
SL AMB POCT SPECIFIC GRAVITY,UA: 1.01
SL AMB POCT URINE HCG: NEGATIVE
SL AMB POCT URINE PROTEIN: NORMAL
SL AMB POCT UROBILINOGEN: 0.2
SL AMB POCT WET MOUNT: ABNORMAL
T VAGINALIS VAG QL WET PREP: NEGATIVE
YEAST VAG QL WET PREP: NEGATIVE

## 2022-11-28 RX ORDER — METRONIDAZOLE 500 MG/1
500 TABLET ORAL EVERY 12 HOURS SCHEDULED
Qty: 14 TABLET | Refills: 0 | Status: SHIPPED | OUTPATIENT
Start: 2022-11-28 | End: 2022-12-05

## 2022-11-30 NOTE — PROGRESS NOTES
[address]   Vaginal Discharge  Name: Ernesto Storm  MRN: [id number]  : 2000      ASSESSMENT/PLAN:  Problem   Bv (Bacterial Vaginosis)    +clue cells on wet mount, Nitrazine, abundant vaginal discharge  Meeting Sal's criteria for bacterial vaginosis  Script sent for flagyl 500mg BID x7 days  Recommended avoiding use of fragrant detergent or products on underwear, routine hygiene after sexual activity  RTC for annual exam or sooner if symptoms worsen or fail to improve           SUBJECTIVE:  Vision is a pleasant 23yo G0 who presents with increased vaginal discharge since   Patient reports feeling dehydrated and initially attributed her symptoms to improper fluid intake  Denies dysuria or urinary complaints  Reports recent sexual activity with longstanding male partner 2 days ago  Not currently using hormonal contraception, infrequent use of condoms  UPT negative  After examination, we discussed diagnosis of BV and course of treatment and resolution  Counseled on appropriate vaginal hygiene after sexual activity and avoidance of fragrance on underwear or in detergent that can alter acid-base balance of vaginal mucosa  Patient expressed understanding and all questions answered  We briefly discussed contraception and information pamphlet was given to patient  Vision is currently undecided but plans to follow-up once she is certain how she wants to proceed  OBJECTIVE:  Vitals:    22 1556   BP: 131/85   Pulse: 91       Physical Exam  Vitals and nursing note reviewed  Exam conducted with a chaperone present  Constitutional:       General: She is not in acute distress  HENT:      Head: Normocephalic  Right Ear: External ear normal       Left Ear: External ear normal    Eyes:      General: No scleral icterus  Right eye: No discharge  Left eye: No discharge        Conjunctiva/sclera: Conjunctivae normal    Cardiovascular:      Rate and Rhythm: Normal rate and regular rhythm  Pulses: Normal pulses  Heart sounds: Normal heart sounds  Pulmonary:      Effort: Pulmonary effort is normal  No respiratory distress  Breath sounds: Normal breath sounds  Abdominal:      General: Abdomen is flat  There is no distension  Palpations: Abdomen is soft  Tenderness: There is no abdominal tenderness  There is no guarding  Genitourinary:     General: Normal vulva  Exam position: Lithotomy position  Nick stage (genital): 5  Labia:         Right: No rash, tenderness, lesion or injury  Left: No rash, tenderness, lesion or injury  Vagina: Vaginal discharge present  No erythema, tenderness, bleeding or lesions  Cervix: No cervical motion tenderness, discharge, lesion, erythema or cervical bleeding  Uterus: Not enlarged and not tender  Adnexa:         Right: No mass, tenderness or fullness  Left: No mass, tenderness or fullness  Comments: Moderate amount of thin, clear vaginal discharge on speculum exam  Musculoskeletal:         General: No swelling or tenderness  Normal range of motion  Cervical back: Normal range of motion  Right lower leg: No edema  Left lower leg: No edema  Skin:     General: Skin is warm and dry  Capillary Refill: Capillary refill takes less than 2 seconds  Neurological:      Mental Status: She is alert and oriented to person, place, and time  Mental status is at baseline     Psychiatric:         Mood and Affect: Mood normal          Behavior: Behavior normal            Racheal Ayala MD  OB/GYN PGY-2  11/28/2022

## 2022-12-04 PROBLEM — N76.0 BV (BACTERIAL VAGINOSIS): Status: ACTIVE | Noted: 2022-12-04

## 2022-12-04 PROBLEM — B96.89 BV (BACTERIAL VAGINOSIS): Status: ACTIVE | Noted: 2022-12-04

## 2022-12-10 ENCOUNTER — HOSPITAL ENCOUNTER (EMERGENCY)
Facility: HOSPITAL | Age: 22
Discharge: HOME | End: 2022-12-10
Attending: EMERGENCY MEDICINE

## 2022-12-10 VITALS
RESPIRATION RATE: 18 BRPM | TEMPERATURE: 98.7 F | BODY MASS INDEX: 36.84 KG/M2 | WEIGHT: 195 LBS | SYSTOLIC BLOOD PRESSURE: 140 MMHG | DIASTOLIC BLOOD PRESSURE: 85 MMHG | OXYGEN SATURATION: 100 % | HEART RATE: 90 BPM

## 2022-12-10 DIAGNOSIS — L50.9 URTICARIA: Primary | ICD-10-CM

## 2022-12-10 RX ORDER — DIPHENHYDRAMINE HCL 25 MG
50 TABLET ORAL EVERY 6 HOURS
Qty: 20 TABLET | Refills: 0 | Status: SHIPPED | OUTPATIENT
Start: 2022-12-10

## 2022-12-10 RX ORDER — PREDNISONE 20 MG/1
60 TABLET ORAL ONCE
Status: COMPLETED | OUTPATIENT
Start: 2022-12-10 | End: 2022-12-10

## 2022-12-10 RX ORDER — DIPHENHYDRAMINE HCL 25 MG
50 TABLET ORAL ONCE
Status: COMPLETED | OUTPATIENT
Start: 2022-12-10 | End: 2022-12-10

## 2022-12-10 RX ORDER — PREDNISONE 20 MG/1
60 TABLET ORAL DAILY
Qty: 12 TABLET | Refills: 0 | Status: SHIPPED | OUTPATIENT
Start: 2022-12-11 | End: 2022-12-15

## 2022-12-10 RX ADMIN — DIPHENHYDRAMINE HCL 50 MG: 25 TABLET ORAL at 11:23

## 2022-12-10 RX ADMIN — PREDNISONE 60 MG: 20 TABLET ORAL at 11:23

## 2022-12-10 NOTE — ED ATTENDING ATTESTATION
12/10/2022  I, Elon Baumgarten, DO, saw and evaluated the patient  I have discussed the patient with the resident/non-physician practitioner and agree with the resident's/non-physician practitioner's findings, Plan of Care, and MDM as documented in the resident's/non-physician practitioner's note, except where noted  All available labs and Radiology studies were reviewed  I was present for key portions of any procedure(s) performed by the resident/non-physician practitioner and I was immediately available to provide assistance  At this point I agree with the current assessment done in the Emergency Department  I have conducted an independent evaluation of this patient a history and physical is as follows:    History provided by:  Patient  Allergic Reaction  Presenting symptoms: itching and rash    Presenting symptoms: no difficulty swallowing and no wheezing    Severity:  Moderate  Prior allergic episodes:  No prior episodes  Relieved by:  Nothing  Worsened by:  Nothing  Ineffective treatments:  Antihistamines   are as follows /85 (BP Location: Right arm)   Pulse 90   Temp 98 7 °F (37 1 °C) (Oral)   Resp 18   Wt 88 5 kg (195 lb)   LMP 12/03/2022   SpO2 100%   BMI 36 84 kg/m²   Review of Systems Review of Systems   Constitutional: Negative for chills and fever  HENT: Negative for rhinorrhea, sore throat and trouble swallowing  Eyes: Negative for pain  Respiratory: Negative for cough, shortness of breath, wheezing and stridor  Cardiovascular: Negative for chest pain and leg swelling  Gastrointestinal: Negative for abdominal pain, diarrhea and nausea  Endocrine: Negative for polyuria  Genitourinary: Negative for dysuria, flank pain and urgency  Musculoskeletal: Negative for joint swelling, myalgias and neck stiffness  Skin: Positive for itching and rash  Allergic/Immunologic: Negative for immunocompromised state     Neurological: Negative for dizziness, syncope, weakness, numbness and headaches  Psychiatric/Behavioral: Negative for confusion and suicidal ideas  All other systems reviewed and are negative  Physical Exam remarkable for Physical Exam  Vitals and nursing note reviewed  Constitutional:       Appearance: Normal appearance  She is well-developed  HENT:      Head: Normocephalic and atraumatic  Nose: Nose normal       Mouth/Throat:      Mouth: Mucous membranes are moist    Eyes:      Extraocular Movements: Extraocular movements intact  Pupils: Pupils are equal, round, and reactive to light  Cardiovascular:      Rate and Rhythm: Normal rate and regular rhythm  Heart sounds: No murmur heard  No friction rub  Pulmonary:      Effort: No respiratory distress  Breath sounds: Normal breath sounds  No wheezing or rales  Abdominal:      General: Bowel sounds are normal  There is no distension  Palpations: Abdomen is soft  Tenderness: There is no abdominal tenderness  Musculoskeletal:         General: No tenderness  Normal range of motion  Cervical back: Normal range of motion and neck supple  Skin:     General: Skin is warm  Findings: Rash present  Rash is urticarial           Neurological:      Mental Status: She is alert and oriented to person, place, and time  Psychiatric:         Mood and Affect: Mood normal       Work up and treatment plan discussed with Treatment Team: Registered Nurse: Lizbeth Gilman RN; Registered Nurse: Donny Patino RN; Resident: Zeina Ambriz MD; ED Technician: Dainne Leos and agreed upon plan               MDM  Number of Diagnoses or Management Options  Diagnosis management comments: 27-year-old female presents emergency department noted urticarial rash started on the right lower extremity bilateral inner thighs as well as under the breast   The patient states that the rash started last evening unknown exposure to anything has a history of eczema, she was complained of some scratchy throat but no evidence of airway compromise no swelling, plan will be for continued Benadryl as well as prednisone,       Lab Results: Lab Results: I have personally reviewed pertinent lab results  Imaging: I have personally reviewed pertinent reports      EKG, Pathology, and Other Studies: I have personally reviewed pertinent films in PACS    Clinical Impression:    Final diagnoses:   Urticaria         Disposition    discharged           New Prescriptions:    Discharge Medication List as of 12/10/2022 11:23 AM      START taking these medications    Details   diphenhydrAMINE (BENADRYL) 25 mg tablet Take 2 tablets (50 mg total) by mouth every 6 (six) hours, Starting Sat 12/10/2022, Normal      predniSONE 20 mg tablet Take 3 tablets (60 mg total) by mouth daily for 4 doses Do not start before December 11, 2022 , Starting Sun 12/11/2022, Until u 12/15/2022, Normal                  Follow-up Instructions:    [facility] [address] [facility] [id number] [phone]  Schedule an appointment as soon as possible for a visit in 2 days  to establish care    [address] [place] [address] Emergency Department  [address] [id number] [phone]  Go to   If symptoms worsen, As needed        ED Course         Critical Care Time  Procedures

## 2022-12-10 NOTE — DISCHARGE INSTRUCTIONS
You can take benadryl 50 mg every 6 hours for your itching  Be careful as this medication can make you drowsy    You will also take 60 mg of prednisone for the next four days starting tomorrow  Follow up with your primary care provider and return to the ED for any worsening symptoms

## 2022-12-10 NOTE — ED PROVIDER NOTES
History  Chief Complaint   Patient presents with   • Itching     Pt reports hives on BL lower extrem and under breasts beginning yesterday evening  Took Benadryl and woke this morning with minimal relief  Pt also reports "scratchy" throat     71-year-old female no significant past medical history presenting to the ED today for rash  Patient states that the rash started yesterday morning  It started on her bilateral lower extremities and started radiating up towards her abdomen and underneath her breast   Patient states that she was at work yesterday and one of the nurses gave her a Benadryl which said it did help her itching and helps her go to sleep however it did not help the rash  She came to the ED today for evaluation  She denies any new foods  Denies new soaps, detergents, clothes  Denies any shortness of breath or nausea or vomiting  She has never had a rash like this before in the past   No one else at home has a rash as well  Prior to Admission Medications   Prescriptions Last Dose Informant Patient Reported? Taking?    clobetasol (TEMOVATE) 0 05 % ointment   No No   Sig: Apply topically 2 (two) times a day   cyclobenzaprine (FLEXERIL) 10 mg tablet   No No   Sig: Take 0 5 tablets (5 mg total) by mouth 3 (three) times a day as needed for muscle spasms   Patient not taking: No sig reported   etonogestrel (NEXPLANON) subdermal implant   Yes No   Sig: Inject 68 mg under the skin   ibuprofen (MOTRIN) 600 mg tablet   No No   Sig: Take 1 tablet (600 mg total) by mouth every 8 (eight) hours as needed for moderate pain   Patient not taking: Reported on 11/8/2022   lidocaine (LIDODERM) 5 %   No No   Sig: Apply 1 patch topically every 24 hours for 7 days Remove & Discard patch within 12 hours or as directed by MD   loratadine (CLARITIN) 10 mg tablet   No No   Sig: Take 1 tablet (10 mg total) by mouth daily   methocarbamol (ROBAXIN) 500 mg tablet   No No   Sig: Take 1 tablet (500 mg total) by mouth 2 (two) times a day for 7 days   Patient not taking: Reported on 11/8/2022      Facility-Administered Medications: None       Past Medical History:   Diagnosis Date   • Anxiety    • Depression 02/01/2022   • FTND (full term normal delivery) 2001   • Herniated disc, cervical     9/2022   • 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[address] )     around age 3 or 4 - has EKG done every 2 years   • Severe menstrual cramps    • Visual impairment    • Yeast infection        Past Surgical History:   Procedure Laterality Date   • NO PAST SURGERIES         Family History   Problem Relation Age of Onset   • No Known Problems Mother    • No Known Problems Father    • No Known Problems Sister    • No Known Problems Brother    • No Known Problems Sister    • No Known Problems Sister    • No Known Problems Brother    • No Known Problems Brother    • Cancer Family         colon   • Breast cancer Paternal Grandmother    • Diabetes Paternal Grandmother    • Hypertension Maternal Grandmother      I have reviewed and agree with the history as documented  E-Cigarette/Vaping   • E-Cigarette Use Former User      E-Cigarette/Vaping Substances   • Nicotine Yes    • THC No    • CBD No    • Flavoring No    • Other No    • Unknown No      Social History     Tobacco Use   • Smoking status: Never   • Smokeless tobacco: Never   Vaping Use   • Vaping Use: Former   • Substances: Nicotine   Substance Use Topics   • Alcohol use: Yes     Alcohol/week: 1 0 standard drink     Types: 1 Standard drinks or equivalent per week     Comment: social   • Drug use: Never     Types: Marijuana     Comment: Occ        Review of Systems   Constitutional: Negative for chills and fever  HENT: Negative for hearing loss  Eyes: Negative for visual disturbance  Respiratory: Negative for shortness of breath  Cardiovascular: Negative for chest pain  Gastrointestinal: Negative for abdominal pain, constipation, diarrhea, nausea and vomiting     Genitourinary: Negative for difficulty urinating  Musculoskeletal: Negative for myalgias  Skin: Positive for rash  Negative for color change  Neurological: Negative for dizziness and headaches  Psychiatric/Behavioral: Negative for agitation  All other systems reviewed and are negative  Physical Exam  ED Triage Vitals [12/10/22 1103]   Temperature Pulse Respirations Blood Pressure SpO2   98 7 °F (37 1 °C) 90 18 140/85 100 %      Temp Source Heart Rate Source Patient Position - Orthostatic VS BP Location FiO2 (%)   Oral Monitor -- Right arm --      Pain Score       --             Orthostatic Vital Signs  Vitals:    12/10/22 1103   BP: 140/85   Pulse: 90       Physical Exam  Vitals and nursing note reviewed  Constitutional:       General: She is not in acute distress  Appearance: Normal appearance  She is well-developed  She is not ill-appearing  HENT:      Head: Normocephalic and atraumatic  Right Ear: External ear normal       Left Ear: External ear normal       Nose: Nose normal       Mouth/Throat:      Mouth: Mucous membranes are moist       Pharynx: Oropharynx is clear  No oropharyngeal exudate  Eyes:      General:         Right eye: No discharge  Left eye: No discharge  Extraocular Movements: Extraocular movements intact  Conjunctiva/sclera: Conjunctivae normal       Pupils: Pupils are equal, round, and reactive to light  Cardiovascular:      Rate and Rhythm: Normal rate and regular rhythm  Heart sounds: Normal heart sounds  No murmur heard  No friction rub  No gallop  Pulmonary:      Effort: Pulmonary effort is normal  No respiratory distress  Breath sounds: Normal breath sounds  No stridor  No wheezing  Abdominal:      General: Bowel sounds are normal  There is no distension  Palpations: Abdomen is soft  Tenderness: There is no abdominal tenderness  Musculoskeletal:         General: No swelling  Normal range of motion        Cervical back: Normal range of motion and neck supple  No rigidity  Skin:     General: Skin is warm and dry  Capillary Refill: Capillary refill takes less than 2 seconds  Findings: Rash present  Comments: Urticarial rash present on the bilateral lower extremities extending all the way up to the thigh  Patient had a picture on her phone from yesterday and the rash does seem to have improved from yesterday  Neurological:      General: No focal deficit present  Mental Status: She is alert and oriented to person, place, and time  Mental status is at baseline  Psychiatric:         Mood and Affect: Mood normal          Behavior: Behavior normal          ED Medications  Medications   predniSONE tablet 60 mg (60 mg Oral Given 12/10/22 1123)   diphenhydrAMINE (BENADRYL) tablet 50 mg (50 mg Oral Given 12/10/22 1123)       Diagnostic Studies  Results Reviewed     None                 No orders to display         Procedures  Procedures      ED Course                                       MDM  Number of Diagnoses or Management Options  Diagnosis management comments: 78-year-old female presenting to the ED today for rash  Only skin involvement at this time  We will give her dose of steroids here and provide her 4 more days of steroids as a burst   We will also advise her to continue taking Benadryl but warned her that it can make her drowsy and that she should be careful if she is going to be driving and should not take it if she is going to operate a vehicle  We will encourage her to follow-up with her primary care provider  Will give strict return to ER precautions        Disposition  Final diagnoses:   Urticaria     Time reflects when diagnosis was documented in both MDM as applicable and the Disposition within this note     Time User Action Codes Description Comment    12/10/2022 11:19 AM Adelfo Anand Add [L50 9] Urticaria       ED Disposition     ED Disposition   Discharge    Condition   Stable    Date/Time   Sat Dec 10, 2022 11:19 AM Comment   Jules Frostmary Sykes discharge to home/self care  Follow-up Information     Follow up With Specialties Details Why [address] [facility] Schedule an appointment as soon as possible for a visit in 2 days to establish care [address] [phone]  [facility] [address], [address] [address], [place], [place], [address]   [facility] Emergency Department Emergency Medicine Go to  If symptoms worsen, As needed [address] Emergency Department, [address] [place] [address], [address] [facility]          Patient's Medications   Discharge Prescriptions    DIPHENHYDRAMINE (BENADRYL) 25 MG TABLET    Take 2 tablets (50 mg total) by mouth every 6 (six) hours       Start Date: 12/10/2022End Date: --       Order Dose: 50 mg       Quantity: 20 tablet    Refills: 0    PREDNISONE 20 MG TABLET    Take 3 tablets (60 mg total) by mouth daily for 4 doses Do not start before December 11, 2022  Start Date: 12/11/2022End Date: 12/15/2022       Order Dose: 60 mg       Quantity: 12 tablet    Refills: 0     No discharge procedures on file  PDMP Review     None           ED Provider  Attending physically available and evaluated Jules Edmonds I managed the patient along with the ED Attending      Electronically Signed by         Dee Banerjee MD  12/10/22 0272

## 2023-02-01 ENCOUNTER — TELEPHONE (OUTPATIENT)
Dept: PSYCHIATRY | Facility: CLINIC | Age: 23
End: 2023-02-01

## 2023-02-01 NOTE — TELEPHONE ENCOUNTER
Writer attempted to contact pt in regards to a vm we received requesting to schedule an appt with Saintclair Maxim Lvm to call back

## 2023-02-03 ENCOUNTER — OFFICE VISIT (OUTPATIENT)
Dept: OBGYN CLINIC | Facility: CLINIC | Age: 23
End: 2023-02-03

## 2023-02-03 ENCOUNTER — TELEPHONE (OUTPATIENT)
Dept: PSYCHIATRY | Facility: CLINIC | Age: 23
End: 2023-02-03

## 2023-02-03 VITALS
DIASTOLIC BLOOD PRESSURE: 75 MMHG | SYSTOLIC BLOOD PRESSURE: 116 MMHG | BODY MASS INDEX: 35.72 KG/M2 | WEIGHT: 189.2 LBS | HEART RATE: 61 BPM | HEIGHT: 61 IN

## 2023-02-03 DIAGNOSIS — F32.81 PMDD (PREMENSTRUAL DYSPHORIC DISORDER): Primary | ICD-10-CM

## 2023-02-03 DIAGNOSIS — L29.2 VULVAR ITCHING: ICD-10-CM

## 2023-02-03 DIAGNOSIS — F41.1 GENERALIZED ANXIETY DISORDER: ICD-10-CM

## 2023-02-03 LAB
BV WHIFF TEST VAG QL: NEGATIVE
CLUE CELLS SPEC QL WET PREP: NEGATIVE
PH SMN: 4 [PH]
SL AMB POCT WET MOUNT: NORMAL
T VAGINALIS VAG QL WET PREP: NEGATIVE
YEAST VAG QL WET PREP: NEGATIVE

## 2023-02-03 RX ORDER — DROSPIRENONE AND ETHINYL ESTRADIOL 0.02-3(28)
1 KIT ORAL DAILY
Qty: 28 TABLET | Refills: 3 | Status: SHIPPED | OUTPATIENT
Start: 2023-02-03

## 2023-02-03 NOTE — TELEPHONE ENCOUNTER
Patient called to schedule with provider  Writer informed pt that message would be routed to  for assistance as provider is on leave

## 2023-02-03 NOTE — PROGRESS NOTES
PROBLEM GYNECOLOGICAL VISIT    Vision Mango Pisano is a 25 y o  female who presents today with complaint of vaginitis and anxiety  Her general medical history has been reviewed and she reports it as follows:    Past Medical History:   Diagnosis Date   • Anxiety    • Depression 2022   • FTND (full term normal delivery) 2001   • Herniated disc, cervical     2022   • 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[address] )     around age 3 or 4 - has EKG done every 2 years   • Severe menstrual cramps    • Visual impairment    • Yeast infection      Past Surgical History:   Procedure Laterality Date   • NO PAST SURGERIES       OB History        0    Para   0    Term   0       0    AB   0    Living   0       SAB   0    IAB   0    Ectopic   0    Multiple   0    Live Births   0               Social History     Tobacco Use   • Smoking status: Never   • Smokeless tobacco: Never   Vaping Use   • Vaping Use: Former   • Substances: Nicotine   Substance Use Topics   • Alcohol use:  Yes     Alcohol/week: 1 0 standard drink     Types: 1 Standard drinks or equivalent per week     Comment: social   • Drug use: Not Currently     Types: Marijuana     Comment: Occ     Social History     Substance and Sexual Activity   Sexual Activity Yes   • Partners: Male   • Birth control/protection: Condom Male, None       Current Outpatient Medications   Medication Instructions   • clobetasol (TEMOVATE) 0 05 % ointment Topical, 2 times daily   • cyclobenzaprine (FLEXERIL) 5 mg, Oral, 3 times daily PRN   • diphenhydrAMINE (BENADRYL) 50 mg, Oral, Every 6 hours   • drospirenone-ethinyl estradiol (RAFIA) 3-0 02 MG per tablet 1 tablet, Oral, Daily   • etonogestrel (NEXPLANON) 68 mg, Subcutaneous   • ibuprofen (MOTRIN) 600 mg, Oral, Every 8 hours PRN   • lidocaine (LIDODERM) 5 % 1 patch, Topical, Every 24 hours, Remove & Discard patch within 12 hours or as directed by MD   • loratadine (CLARITIN) 10 mg, Oral, Daily   • methocarbamol (ROBAXIN) 500 mg, Oral, 2 times daily       History of Present Illness:   Vision presents today reporting issues with vaginitis in the few days leading up to her menses for the past few cycles  She reports at that time she will have thick, clumpy, white discharge and vulvar itching  The symptoms will resolve when her menses starts  She does not currently have the symptoms, had her menses a little over a week ago  She also reports concerns about anxiety  She has a history of anxiety and depression in the past, but feels that the anxiety has become severe since her nexplanon devise was removed 4 months ago  She feels that the anxiety is constant, but is much worse the week before her menses  She states she feels constant worry over things that are unimportant or situations that realistically she knows will not happen  She also feels like she has days where she dissociates and she gets to the end of the day without memory of how the day passed  She used to have a therapist that she felt ws helpful but she is currently not seeing patient due to maternity leave  She denies any SI/HI  Review of Systems:  Review of Systems   Constitutional: Negative  Gastrointestinal: Negative  Genitourinary: Negative  Psychiatric/Behavioral: The patient is nervous/anxious  Physical Exam:  /75 (BP Location: Right arm, Patient Position: Sitting, Cuff Size: Large)   Pulse 61   Ht 5' 1" (1 549 m)   Wt 85 8 kg (189 lb 3 2 oz)   LMP 01/26/2023 (Exact Date)   BMI 35 75 kg/m²   Physical Exam  Constitutional:       General: She is not in acute distress  Appearance: Normal appearance  Genitourinary:      Vulva normal       No lesions in the vagina  Vaginal exam comments: Normal vaginal discharge   Right Adnexa: not tender, not full and no mass present  Left Adnexa: not tender, not full and no mass present  No cervical motion tenderness or lesion  Neurological:      Mental Status: She is alert     Skin: General: Skin is warm and dry  Psychiatric:         Mood and Affect: Mood normal          Behavior: Behavior normal    Vitals reviewed  Point of Care Testing:   -Wet mount: -yeast, -trich, -clue cells    -KOH mount: -yeast    -Whiff: negative    -pH 4    Assessment:   1  PMDD    2  Vulvar itching     Plan:   1  Wet mount and physical exam negative for current infection    2  Reviewed good vulvar skin care measures and vaginitis prevention    3  Discussed worsening anxiety prior to menses and PMDD  She does have underlying generalized anxiety disorder and depressive disorder but symptoms seem to be worsened prior to menses  This has become evident to her after stopping hormonal birth control  Discussed options for restarting hormonal birth control to manage PMDD and she is in agreement  Will start Ambar OCPs now  Discussed instructions for use and risks/benefits  Discussed mental health crisis services and warning signs  Patient was referred to Social Work for assistance with mental health resources as she is unable to see her therapist and she is interested in finding a new one  4  Follow up in 3 months for OCP check or sooner if needed  Reviewed with patient that test results are available in [facility] immediately, but that they will not necessarily be reviewed by me immediately  Explained that I will review results at my earliest opportunity and contact patient appropriately

## 2023-02-28 ENCOUNTER — TELEPHONE (OUTPATIENT)
Dept: PSYCHIATRY | Facility: CLINIC | Age: 23
End: 2023-02-28

## 2023-02-28 NOTE — TELEPHONE ENCOUNTER
Spoke to patient in regards to scheduling follow up with Cassy Ramos  Patient stated she will call the office back to schedule as she was in class at the time of the call

## 2023-03-16 ENCOUNTER — TELEMEDICINE (OUTPATIENT)
Dept: BEHAVIORAL/MENTAL HEALTH CLINIC | Facility: CLINIC | Age: 23
End: 2023-03-16

## 2023-03-16 DIAGNOSIS — F32.A DEPRESSION, UNSPECIFIED DEPRESSION TYPE: ICD-10-CM

## 2023-03-16 DIAGNOSIS — F41.1 GENERALIZED ANXIETY DISORDER: Primary | ICD-10-CM

## 2023-03-16 NOTE — PSYCH
Virtual Regular Visit    Verification of patient location:    Patient is located in the following state in which I hold an active license [place]      Assessment/Plan:    Problem List Items Addressed This Visit        Other    Generalized anxiety disorder - Primary    Depression       Goals addressed in session: Goal 1 and Goal 2          Reason for visit is No chief complaint on file  Encounter provider Delvis Brooks    Provider located at [address] [place] [id number] [phone]      Recent Visits  Date Type Provider Dept   03/16/23 310 [facility]   Showing recent visits within past 7 days and meeting all other requirements  Future Appointments  No visits were found meeting these conditions  Showing future appointments within next 150 days and meeting all other requirements       The patient was identified by name and date of birth  Vision Greg Coppola Adjutant was informed that this is a telemedicine visit and that the visit is being conducted throughthe QVIVO platform  She agrees to proceed     My office door was closed  No one else was in the room  She acknowledged consent and understanding of privacy and security of the video platform  The patient has agreed to participate and understands they can discontinue the visit at any time  Patient is aware this is a billable service       Subjective  Vision Conner Galan is a 26 y o  female       HPI     Past Medical History:   Diagnosis Date   • Anxiety    • Depression 02/01/2022   • FTND (full term normal delivery) 2001   • Herniated disc, cervical     9/2022   • 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[facility] [address] )     around age 3 or 4 - has EKG done every 2 years   • Severe menstrual cramps    • Visual impairment    • Yeast infection        Past Surgical History:   Procedure Laterality Date   • NO PAST SURGERIES Current Outpatient Medications   Medication Sig Dispense Refill   • clobetasol (TEMOVATE) 0 05 % ointment Apply topically 2 (two) times a day (Patient not taking: Reported on 2/3/2023) 30 g 0   • cyclobenzaprine (FLEXERIL) 10 mg tablet Take 0 5 tablets (5 mg total) by mouth 3 (three) times a day as needed for muscle spasms (Patient not taking: Reported on 9/29/2022) 20 tablet 0   • diphenhydrAMINE (BENADRYL) 25 mg tablet Take 2 tablets (50 mg total) by mouth every 6 (six) hours 20 tablet 0   • drospirenone-ethinyl estradiol (RAFIA) 3-0 02 MG per tablet Take 1 tablet by mouth daily 28 tablet 3   • etonogestrel (NEXPLANON) subdermal implant Inject 68 mg under the skin     • ibuprofen (MOTRIN) 600 mg tablet Take 1 tablet (600 mg total) by mouth every 8 (eight) hours as needed for moderate pain (Patient not taking: Reported on 11/8/2022) 30 tablet 0   • lidocaine (LIDODERM) 5 % Apply 1 patch topically every 24 hours for 7 days Remove & Discard patch within 12 hours or as directed by MD 7 patch 0   • loratadine (CLARITIN) 10 mg tablet Take 1 tablet (10 mg total) by mouth daily 30 tablet 3   • methocarbamol (ROBAXIN) 500 mg tablet Take 1 tablet (500 mg total) by mouth 2 (two) times a day for 7 days (Patient not taking: Reported on 11/8/2022) 14 tablet 0     No current facility-administered medications for this visit  No Known Allergies    Review of Systems    Video Exam    There were no vitals filed for this visit  Physical Exam     Behavioral Health Psychotherapy Progress Note    Psychotherapy Provided: Individual Psychotherapy     1  Generalized anxiety disorder        2  Depression, unspecified depression type            Goals addressed in session: Goal 1 and Goal 2     DATA: Clinician met with Vision via telehealth for individual therapy  She reports an increase in depressive symptoms in the past few weeks resulted in missed classes   She reports having trouble getting out of bed, 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 and doing anything for self care during that time  Clinician explored depressive symptoms and validated difficultness of this  Clinician explored possible solutions in the form of behavioral interventions and the possibility of medication to assist with symptom reduction  Jules also reports family conflict has been weighing on her and that overall she has been feeling more overwhelmed  Clinician discussed importance of self care and discussed small changed she can make to prioritize her mental well being and decrease depressive symptoms  During this session, this clinician used the following therapeutic modalities: Engagement Strategies, Client-centered Therapy and Supportive Psychotherapy    Substance Abuse was not addressed during this session  If the client is diagnosed with a co-occurring substance use disorder, please indicate any changes in the frequency or amount of use: n/a  Stage of change for addressing substance use diagnoses: No substance use/Not applicable    ASSESSMENT:  Jules Gregorio presents with a Euthymic/ normal mood  her affect is Normal range and intensity, which is congruent, with her mood and the content of the session  The client has made progress on their goals  Jules Gregorio presents with a none risk of suicide, none risk of self-harm, and none risk of harm to others  For any risk assessment that surpasses a "low" rating, a safety plan must be developed  A safety plan was indicated: no  If yes, describe in detail n/a    PLAN: Between sessions, Vision Star Faster will increase self care and use of coping skills  At the next session, the therapist will use Engagement Strategies, Client-centered Therapy and Supportive Psychotherapy to address depressive symptoms  Behavioral Health Treatment Plan and Discharge Planning: Suzie Burkitt is aware of and agrees to continue to work on their treatment plan   They have identified and are working toward their discharge goals   yes    Visit start and stop times:    03/17/23  Start Time: 1400  Stop Time: 1441  Total Visit Time: 41 minutes

## 2023-03-23 ENCOUNTER — TELEMEDICINE (OUTPATIENT)
Dept: BEHAVIORAL/MENTAL HEALTH CLINIC | Facility: CLINIC | Age: 23
End: 2023-03-23

## 2023-03-23 DIAGNOSIS — F41.1 GENERALIZED ANXIETY DISORDER: ICD-10-CM

## 2023-03-23 DIAGNOSIS — F32.A DEPRESSION, UNSPECIFIED DEPRESSION TYPE: Primary | ICD-10-CM

## 2023-03-23 NOTE — PSYCH
Virtual Regular Visit    Verification of patient location:    Patient is located in the following state in which I hold an active license [place]      Assessment/Plan:    Problem List Items Addressed This Visit        Other    Generalized anxiety disorder    Depression - Primary       Goals addressed in session: Goal 1 and Goal 2          Reason for visit is   Chief Complaint   Patient presents with   • Virtual Regular Visit        Encounter provider Jhonny Garcia    Provider located at [address] [place] [id number] [phone]      Recent Visits  Date Type Provider Dept   03/23/23 310 [facility]   Showing recent visits within past 7 days and meeting all other requirements  Future Appointments  No visits were found meeting these conditions  Showing future appointments within next 150 days and meeting all other requirements       The patient was identified by name and date of birth  Vision Greg Schmidt was informed that this is a telemedicine visit and that the visit is being conducted throughthe [employer] platform  She agrees to proceed     My office door was closed  No one else was in the room  She acknowledged consent and understanding of privacy and security of the video platform  The patient has agreed to participate and understands they can discontinue the visit at any time  Patient is aware this is a billable service       Subjective  Vision Carola Sheikh is a 26 y o  female       HPI     Past Medical History:   Diagnosis Date   • Anxiety    • Depression 02/01/2022   • FTND (full term normal delivery) 2001   • Herniated disc, cervical     9/2022   • 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 4 - has EKG done every 2 years   • Severe menstrual cramps    • Visual impairment    • Yeast infection        Past Surgical History:   Procedure Laterality Date   • NO PAST SURGERIES         Current Outpatient Medications   Medication Sig Dispense Refill   • clobetasol (TEMOVATE) 0 05 % ointment Apply topically 2 (two) times a day (Patient not taking: Reported on 2/3/2023) 30 g 0   • cyclobenzaprine (FLEXERIL) 10 mg tablet Take 0 5 tablets (5 mg total) by mouth 3 (three) times a day as needed for muscle spasms (Patient not taking: Reported on 9/29/2022) 20 tablet 0   • diphenhydrAMINE (BENADRYL) 25 mg tablet Take 2 tablets (50 mg total) by mouth every 6 (six) hours 20 tablet 0   • drospirenone-ethinyl estradiol (RAFIA) 3-0 02 MG per tablet Take 1 tablet by mouth daily 28 tablet 3   • etonogestrel (NEXPLANON) subdermal implant Inject 68 mg under the skin     • ibuprofen (MOTRIN) 600 mg tablet Take 1 tablet (600 mg total) by mouth every 8 (eight) hours as needed for moderate pain (Patient not taking: Reported on 11/8/2022) 30 tablet 0   • lidocaine (LIDODERM) 5 % Apply 1 patch topically every 24 hours for 7 days Remove & Discard patch within 12 hours or as directed by MD 7 patch 0   • loratadine (CLARITIN) 10 mg tablet Take 1 tablet (10 mg total) by mouth daily 30 tablet 3   • methocarbamol (ROBAXIN) 500 mg tablet Take 1 tablet (500 mg total) by mouth 2 (two) times a day for 7 days (Patient not taking: Reported on 11/8/2022) 14 tablet 0     No current facility-administered medications for this visit  No Known Allergies    Review of Systems    Video Exam    There were no vitals filed for this visit  Behavioral Health Psychotherapy Progress Note    Psychotherapy Provided: Individual Psychotherapy     1  Depression, unspecified depression type        2  Generalized anxiety disorder            Goals addressed in session: Goal 1 and Goal 2     DATA: Clinician met with Vision via telehealth for individual therapy   She reports that she has continued to have ups and downs in mood and when she has felt lack of motivation and sadness hse has struggled to complete necessary tasks, complete coping skills and self care and 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  She discussed avoiding her teachers as they have reached out and that she recently was called in by the mana to discuss concerns  She reports that she feels better after this meeting stating they that were concerns for her and offered support and understanding  Clinician validated this and discussed behavioral techniques to assist with increasing mood and the possibility of taking medication to assist with symptoms  Jules reports desire to work on mood through talk therapy and behavioral changes  During this session, this clinician used the following therapeutic modalities: Engagement Strategies, Client-centered Therapy, Solution-Focused Therapy and Supportive Psychotherapy    Substance Abuse was not addressed during this session  If the client is diagnosed with a co-occurring substance use disorder, please indicate any changes in the frequency or amount of use: n/a  Stage of change for addressing substance use diagnoses: No substance use/Not applicable   Vision did report that she has decided not to drink at all finding that even drinking 1-2 drinks with friends results in her feeling increase in depressive symptoms the next day  Clinician validated self awareness  ASSESSMENT:  Jules Reid presents with a Depressed mood  her affect is Normal range and intensity and Flat, which is congruent, with her mood and the content of the session  The client has made progress on their goals  Jules Reid presents with a none risk of suicide, none risk of self-harm, and none risk of harm to others  For any risk assessment that surpasses a "low" rating, a safety plan must be developed  A safety plan was indicated: no  If yes, describe in detail n/a    PLAN: Between sessions, Jules Reid will utilize coping skills   At the next session, the therapist will use Engagement Strategies to address depression  Behavioral Health Treatment Plan and Discharge Planning: Nat Yeung is aware of and agrees to continue to work on their treatment plan  They have identified and are working toward their discharge goals   yes    Visit start and stop times:    03/24/23  Start Time: 6830  Stop Time: 1350  Total Visit Time: 43 minutes    Physical Exam

## 2023-04-06 ENCOUNTER — TELEMEDICINE (OUTPATIENT)
Dept: BEHAVIORAL/MENTAL HEALTH CLINIC | Facility: CLINIC | Age: 23
End: 2023-04-06

## 2023-04-06 DIAGNOSIS — F32.A DEPRESSION, UNSPECIFIED DEPRESSION TYPE: ICD-10-CM

## 2023-04-06 DIAGNOSIS — F41.1 GENERALIZED ANXIETY DISORDER: Primary | ICD-10-CM

## 2023-04-06 NOTE — PSYCH
Virtual Regular Visit    Verification of patient location:    Patient is located in the following state in which I hold an active license [place]      Assessment/Plan:    Problem List Items Addressed This Visit        Other    Generalized anxiety disorder - Primary    Depression       Goals addressed in session: Goal 1 and Goal 2          Reason for visit is   Chief Complaint   Patient presents with   • Virtual Regular Visit          Encounter provider Misti Anglin    Provider located at [address] [place] [id number] [phone]      Recent Visits  No visits were found meeting these conditions  Showing recent visits within past 7 days and meeting all other requirements  Today's Visits  Date Type Provider Dept   04/06/23 Telemedicine [facility]   Showing today's visits and meeting all other requirements  Future Appointments  No visits were found meeting these conditions  Showing future appointments within next 150 days and meeting all other requirements       The patient was identified by name and date of birth  Vision Greg Timo Jade was informed that this is a telemedicine visit and that the visit is being conducted throughthe [employer] platform  She agrees to proceed     My office door was closed  No one else was in the room  She acknowledged consent and understanding of privacy and security of the video platform  The patient has agreed to participate and understands they can discontinue the visit at any time  Patient is aware this is a billable service       Subjective  Vision Sarah Perez is a 26 y o  female       HPI     Past Medical History:   Diagnosis Date   • Anxiety    • Depression 02/01/2022   • FTND (full term normal delivery) 2001   • Herniated disc, cervical     9/2022   • 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 - has EKG done every 2 years   • Severe menstrual cramps    • Visual impairment    • Yeast infection        Past Surgical History:   Procedure Laterality Date   • NO PAST SURGERIES         Current Outpatient Medications   Medication Sig Dispense Refill   • clobetasol (TEMOVATE) 0 05 % ointment Apply topically 2 (two) times a day (Patient not taking: Reported on 2/3/2023) 30 g 0   • cyclobenzaprine (FLEXERIL) 10 mg tablet Take 0 5 tablets (5 mg total) by mouth 3 (three) times a day as needed for muscle spasms (Patient not taking: Reported on 9/29/2022) 20 tablet 0   • diphenhydrAMINE (BENADRYL) 25 mg tablet Take 2 tablets (50 mg total) by mouth every 6 (six) hours 20 tablet 0   • drospirenone-ethinyl estradiol (RAFIA) 3-0 02 MG per tablet Take 1 tablet by mouth daily 28 tablet 3   • etonogestrel (NEXPLANON) subdermal implant Inject 68 mg under the skin     • ibuprofen (MOTRIN) 600 mg tablet Take 1 tablet (600 mg total) by mouth every 8 (eight) hours as needed for moderate pain (Patient not taking: Reported on 11/8/2022) 30 tablet 0   • lidocaine (LIDODERM) 5 % Apply 1 patch topically every 24 hours for 7 days Remove & Discard patch within 12 hours or as directed by MD 7 patch 0   • loratadine (CLARITIN) 10 mg tablet Take 1 tablet (10 mg total) by mouth daily 30 tablet 3   • methocarbamol (ROBAXIN) 500 mg tablet Take 1 tablet (500 mg total) by mouth 2 (two) times a day for 7 days (Patient not taking: Reported on 11/8/2022) 14 tablet 0     No current facility-administered medications for this visit  No Known Allergies    Review of Systems    Video Exam    There were no vitals filed for this visit  Physical Exam     Behavioral Health Psychotherapy Progress Note    Psychotherapy Provided: Individual Psychotherapy     1  Generalized anxiety disorder        2  Depression, unspecified depression type            Goals addressed in session: Goal 1 and Goal 2     DATA: Clinician met with Vision via telehealth for individual therapy  "Clinician checked in with Vision about symptoms and use of coping skills to reduce symptoms of anxiety and depression  Vision reports follow thought with health coping skills such as going to the gym, eating well and prioritizing sleep  Clinician validated and encourage this and getting into a healthy schedule  Vision processed conflict with family and working to set better boundaries with family in order prioritize her own mental wellbeing  Clinician provided feedback and support  During this session, this clinician used the following therapeutic modalities: Solution-Focused Therapy and Supportive Psychotherapy    Substance Abuse was not addressed during this session  If the client is diagnosed with a co-occurring substance use disorder, please indicate any changes in the frequency or amount of use: n/a  Stage of change for addressing substance use diagnoses: No substance use/Not applicable    ASSESSMENT:  Jules Anderson presents with a Anxious and Depressed mood  her affect is Normal range and intensity and Tearful, which is congruent, with her mood and the content of the session  The client has made progress on their goals  Jules Anderson presents with a none risk of suicide, none risk of self-harm, and none risk of harm to others  For any risk assessment that surpasses a \"low\" rating, a safety plan must be developed  A safety plan was indicated: no  If yes, describe in detail n/a    PLAN: Between sessions, Jules Anderson will work on setting healthy boundaries  At the next session, the therapist will use Solution-Focused Therapy and Supportive Psychotherapy to address anxiety and depression  Behavioral Health Treatment Plan and Discharge Planning: Kvng Addis is aware of and agrees to continue to work on their treatment plan  They have identified and are working toward their discharge goals   yes    Visit start and stop times:    04/06/23  Start Time: 1400  Stop " Time: 7955  Total Visit Time: 45 minutes

## 2023-05-01 ENCOUNTER — OFFICE VISIT (OUTPATIENT)
Dept: OBGYN CLINIC | Facility: CLINIC | Age: 23
End: 2023-05-01

## 2023-05-01 VITALS
RESPIRATION RATE: 18 BRPM | SYSTOLIC BLOOD PRESSURE: 118 MMHG | BODY MASS INDEX: 35.65 KG/M2 | DIASTOLIC BLOOD PRESSURE: 74 MMHG | HEART RATE: 66 BPM | HEIGHT: 61 IN | WEIGHT: 188.8 LBS

## 2023-05-01 DIAGNOSIS — R35.0 URINARY FREQUENCY: Primary | ICD-10-CM

## 2023-05-01 LAB
SL AMB  POCT GLUCOSE, UA: ABNORMAL
SL AMB LEUKOCYTE ESTERASE,UA: ABNORMAL
SL AMB POCT BILIRUBIN,UA: ABNORMAL
SL AMB POCT BLOOD,UA: ABNORMAL
SL AMB POCT CLARITY,UA: CLEAR
SL AMB POCT COLOR,UA: ABNORMAL
SL AMB POCT KETONES,UA: ABNORMAL
SL AMB POCT NITRITE,UA: ABNORMAL
SL AMB POCT PH,UA: 6
SL AMB POCT SPECIFIC GRAVITY,UA: 1.03
SL AMB POCT URINE PROTEIN: ABNORMAL
SL AMB POCT UROBILINOGEN: 0.2

## 2023-05-01 RX ORDER — PHENAZOPYRIDINE HYDROCHLORIDE 95 MG/1
95 TABLET ORAL 3 TIMES DAILY PRN
Qty: 10 TABLET | Refills: 0 | Status: SHIPPED | OUTPATIENT
Start: 2023-05-01

## 2023-05-01 NOTE — PROGRESS NOTES
"Subjective:     Vision Freda Felty is a 26 y o  Kristen Safe female who presents with urinary urgency, frequency, and dysuria for the past week  Denies any vaginal discharge, rashes, or lesions  LMP was 4/24/23 and was normal  Patient sexually active with the same male partner for the past 4 years, does not always use protection  Objective:    Vitals: Blood pressure 118/74, pulse 66, resp  rate 18, height 5' 1\" (1 549 m), weight 85 6 kg (188 lb 12 8 oz), last menstrual period 04/24/2023, not currently breastfeeding  Body mass index is 35 67 kg/m²  Physical Exam  Constitutional:       General: She is not in acute distress  Appearance: She is not ill-appearing or toxic-appearing  HENT:      Head: Normocephalic and atraumatic  Pulmonary:      Effort: Pulmonary effort is normal  No respiratory distress  Breath sounds: No stridor  Skin:     General: Skin is warm and dry  Neurological:      General: No focal deficit present  Mental Status: She is alert and oriented to person, place, and time  Psychiatric:         Mood and Affect: Mood normal          Thought Content:  Thought content normal          Judgment: Judgment normal             Assessment/Plan:  · UA in office negative, however patient states that she has previously had UA's that were negative but then follow up positive urine cultures  · Urine sent for culture and G&C  · Encouraged consistent condom use  · Rx sent for Azo x3 days        Wiley Goode MD  5/1/2023  1:45 PM          "

## 2023-05-02 LAB
C TRACH DNA SPEC QL NAA+PROBE: NEGATIVE
N GONORRHOEA DNA SPEC QL NAA+PROBE: NEGATIVE

## 2023-05-03 LAB — BACTERIA UR CULT: NORMAL

## 2023-05-04 ENCOUNTER — TELEMEDICINE (OUTPATIENT)
Dept: BEHAVIORAL/MENTAL HEALTH CLINIC | Facility: CLINIC | Age: 23
End: 2023-05-04

## 2023-05-04 DIAGNOSIS — F32.A DEPRESSION, UNSPECIFIED DEPRESSION TYPE: ICD-10-CM

## 2023-05-04 DIAGNOSIS — F41.1 GENERALIZED ANXIETY DISORDER: Primary | ICD-10-CM

## 2023-05-04 NOTE — PSYCH
Virtual Regular Visit    Verification of patient location:    Patient is located at Home in the following state in which I hold an active license [place]      Assessment/Plan:    Problem List Items Addressed This Visit        Other    Generalized anxiety disorder - Primary    Depression       Goals addressed in session: Goal 1 and Goal 2          Reason for visit is   Chief Complaint   Patient presents with    Virtual Regular Visit          Encounter provider Annie Sparks    Provider located at [address] [place] [id number] [phone]      Recent Visits  No visits were found meeting these conditions  Showing recent visits within past 7 days and meeting all other requirements  Today's Visits  Date Type Provider Dept   05/04/23 Telemedicine [address]   Showing today's visits and meeting all other requirements  Future Appointments  No visits were found meeting these conditions  Showing future appointments within next 150 days and meeting all other requirements       The patient was identified by name and date of birth  Vision Greg Kothari was informed that this is a telemedicine visit and that the visit is being conducted throughthe [employer]  She agrees to proceed     My office door was closed  No one else was in the room  She acknowledged consent and understanding of privacy and security of the video platform  The patient has agreed to participate and understands they can discontinue the visit at any time  Patient is aware this is a billable service  Subjective  Vision Harjit Bowser is a 26 y o  female          HPI     Past Medical History:   Diagnosis Date    Anxiety     Depression 02/01/2022    FTND (full term normal delivery) 2001    Herniated disc, cervical     9/2022    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 age 3 or 4 - has EKG done every 2 years    Severe menstrual cramps     Visual impairment     Yeast infection        Past Surgical History:   Procedure Laterality Date    NO PAST SURGERIES         Current Outpatient Medications   Medication Sig Dispense Refill    clobetasol (TEMOVATE) 0 05 % ointment Apply topically 2 (two) times a day (Patient not taking: Reported on 2/3/2023) 30 g 0    cyclobenzaprine (FLEXERIL) 10 mg tablet Take 0 5 tablets (5 mg total) by mouth 3 (three) times a day as needed for muscle spasms (Patient not taking: Reported on 9/29/2022) 20 tablet 0    diphenhydrAMINE (BENADRYL) 25 mg tablet Take 2 tablets (50 mg total) by mouth every 6 (six) hours (Patient not taking: Reported on 5/1/2023) 20 tablet 0    drospirenone-ethinyl estradiol (RAFIA) 3-0 02 MG per tablet Take 1 tablet by mouth daily 28 tablet 3    etonogestrel (NEXPLANON) subdermal implant Inject 68 mg under the skin (Patient not taking: Reported on 5/1/2023)      ibuprofen (MOTRIN) 600 mg tablet Take 1 tablet (600 mg total) by mouth every 8 (eight) hours as needed for moderate pain (Patient not taking: Reported on 11/8/2022) 30 tablet 0    lidocaine (LIDODERM) 5 % Apply 1 patch topically every 24 hours for 7 days Remove & Discard patch within 12 hours or as directed by MD 7 patch 0    loratadine (CLARITIN) 10 mg tablet Take 1 tablet (10 mg total) by mouth daily 30 tablet 3    methocarbamol (ROBAXIN) 500 mg tablet Take 1 tablet (500 mg total) by mouth 2 (two) times a day for 7 days (Patient not taking: Reported on 11/8/2022) 14 tablet 0    phenazopyridine (PYRIDIUM) 95 MG tablet Take 1 tablet (95 mg total) by mouth 3 (three) times a day as needed for bladder spasms 10 tablet 0     No current facility-administered medications for this visit  No Known Allergies    Review of Systems    Video Exam    There were no vitals filed for this visit      Physical Exam     Visit Time    Behavioral Health Psychotherapy Progress Note    Psychotherapy "Provided: Individual Psychotherapy     1  Generalized anxiety disorder        2  Depression, unspecified depression type            Goals addressed in session: Goal 1 and Goal 2     DATA: Clinician met with Vision via telehealth for individual therapy  Vision processed time off between semesters and setting more healthy habits to be carried over into the next semester  Clinician explored schedules and how she plans to follow through with the work she has put in the last three weeks to be more physically activity and complete more self care  Vision discussed strategies she will use to manage busy schedule  During this session, this clinician used the following therapeutic modalities: Client-centered Therapy and Cognitive Processing Therapy    Substance Abuse was not addressed during this session  If the client is diagnosed with a co-occurring substance use disorder, please indicate any changes in the frequency or amount of use: n/a  Stage of change for addressing substance use diagnoses: No substance use/Not applicable    ASSESSMENT:  Jules Altman presents with a Euthymic/ normal mood  her affect is Normal range and intensity, which is congruent, with her mood and the content of the session  The client has made progress on their goals  Jules Altman presents with a none risk of suicide, none risk of self-harm, and none risk of harm to others  For any risk assessment that surpasses a \"low\" rating, a safety plan must be developed  A safety plan was indicated: no  If yes, describe in detail n/a    PLAN: Between sessions, Jules Altman will work on schedules and self care  At the next session, the therapist will use Client-centered Therapy and Cognitive Processing Therapy to address anxiety and depressive symptoms  Behavioral Health Treatment Plan and Discharge Planning: Ramakrishna Anne is aware of and agrees to continue to work on their treatment plan   They have " identified and are working toward their discharge goals   yes    Visit start and stop times:    05/04/23  Start Time: 1400  Stop Time: 1440  Total Visit Time: 40 minutes

## 2023-05-18 ENCOUNTER — TELEMEDICINE (OUTPATIENT)
Dept: BEHAVIORAL/MENTAL HEALTH CLINIC | Facility: CLINIC | Age: 23
End: 2023-05-18

## 2023-05-18 DIAGNOSIS — F41.1 GENERALIZED ANXIETY DISORDER: Primary | ICD-10-CM

## 2023-05-18 DIAGNOSIS — F32.A DEPRESSION, UNSPECIFIED DEPRESSION TYPE: ICD-10-CM

## 2023-05-18 NOTE — PSYCH
Virtual Regular Visit    Verification of patient location:    Patient is located at Home in the following state in which I hold an active license [place]      Assessment/Plan:    Problem List Items Addressed This Visit        Other    Generalized anxiety disorder - Primary    Depression       Goals addressed in session: Goal 1 and Goal 2          Reason for visit is   Chief Complaint   Patient presents with   • Virtual Regular Visit        Encounter provider Kwame Iraheta    Provider located at [address] [place] [id number] [phone]      Recent Visits  No visits were found meeting these conditions  Showing recent visits within past 7 days and meeting all other requirements  Today's Visits  Date Type Provider Dept   05/18/23 Telemedicine [address]   Showing today's visits and meeting all other requirements  Future Appointments  No visits were found meeting these conditions  Showing future appointments within next 150 days and meeting all other requirements       The patient was identified by name and date of birth  Jules Arzateilya Mujica was informed that this is a telemedicine visit and that the visit is being conducted thr[facility]  She agrees to proceed     My office door was closed  No one else was in the room  She acknowledged consent and understanding of privacy and security of the video platform  The patient has agreed to participate and understands they can discontinue the visit at any time  Patient is aware this is a billable service       Subjective  Vision Shasta Velazquez is a 26 y o  female      HPI     Past Medical History:   Diagnosis Date   • Anxiety    • Depression 02/01/2022   • FTND (full term normal delivery) 2001   • Herniated disc, cervical     9/2022   • 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 3 or 4 - has EKG done every 2 years   • Severe menstrual cramps    • Visual impairment    • Yeast infection        Past Surgical History:   Procedure Laterality Date   • NO PAST SURGERIES         Current Outpatient Medications   Medication Sig Dispense Refill   • clobetasol (TEMOVATE) 0 05 % ointment Apply topically 2 (two) times a day (Patient not taking: Reported on 2/3/2023) 30 g 0   • cyclobenzaprine (FLEXERIL) 10 mg tablet Take 0 5 tablets (5 mg total) by mouth 3 (three) times a day as needed for muscle spasms (Patient not taking: Reported on 9/29/2022) 20 tablet 0   • diphenhydrAMINE (BENADRYL) 25 mg tablet Take 2 tablets (50 mg total) by mouth every 6 (six) hours (Patient not taking: Reported on 5/1/2023) 20 tablet 0   • drospirenone-ethinyl estradiol (RAFIA) 3-0 02 MG per tablet Take 1 tablet by mouth daily 28 tablet 3   • etonogestrel (NEXPLANON) subdermal implant Inject 68 mg under the skin (Patient not taking: Reported on 5/1/2023)     • ibuprofen (MOTRIN) 600 mg tablet Take 1 tablet (600 mg total) by mouth every 8 (eight) hours as needed for moderate pain (Patient not taking: Reported on 11/8/2022) 30 tablet 0   • lidocaine (LIDODERM) 5 % Apply 1 patch topically every 24 hours for 7 days Remove & Discard patch within 12 hours or as directed by MD 7 patch 0   • loratadine (CLARITIN) 10 mg tablet Take 1 tablet (10 mg total) by mouth daily 30 tablet 3   • methocarbamol (ROBAXIN) 500 mg tablet Take 1 tablet (500 mg total) by mouth 2 (two) times a day for 7 days (Patient not taking: Reported on 11/8/2022) 14 tablet 0   • phenazopyridine (PYRIDIUM) 95 MG tablet Take 1 tablet (95 mg total) by mouth 3 (three) times a day as needed for bladder spasms 10 tablet 0     No current facility-administered medications for this visit  No Known Allergies    Review of Systems    Video Exam    There were no vitals filed for this visit      Physical Exam     Behavioral Health Psychotherapy Progress Note    Psychotherapy Provided: Individual "Psychotherapy     1  Generalized anxiety disorder        2  Depression, unspecified depression type            Goals addressed in session: Goal 1     DATA: Clinician met with Vision via telehealth for individual therapy  Vision reports an increase in cycling which she described as weeks where she is on top of assignments and getting everything done to the next week where she feels she crashes and is unable to keep up with all necessary tasks and assignments  Clinician explored this and discussed symptoms of ADHD  Vision reports difficulty with being on time and staying on task at school even thought she wants to complete work and is enjoying her schooling  Clinician explored possible solutions for this and encouraged uJles to discuss symptoms with her provider  Vision was open to this even though she is hesitant to be on medication  During this session, this clinician used the following therapeutic modalities: Client-centered Therapy and Solution-Focused Therapy    Substance Abuse was not addressed during this session  If the client is diagnosed with a co-occurring substance use disorder, please indicate any changes in the frequency or amount of use: n/a  Stage of change for addressing substance use diagnoses: No substance use/Not applicable    ASSESSMENT:  Jules Mccarthy presents with a Euthymic/ normal and Anxious mood  her affect is Normal range and intensity, which is congruent, with her mood and the content of the session  The client has made progress on their goals  Vision was open and engaged in the session Jules Mccarthy presents with a none risk of suicide, none risk of self-harm, and none risk of harm to others  For any risk assessment that surpasses a \"low\" rating, a safety plan must be developed  A safety plan was indicated: no  If yes, describe in detail n/a    PLAN: Between sessions, Jules Mccarthy will follow up with provider about current symptoms   At the next " session, the therapist will use Engagement Strategies, Client-centered Therapy and Supportive Psychotherapy to address symptoms of ADHD  Behavioral Health Treatment Plan and Discharge Planning: Jv Mayers is aware of and agrees to continue to work on their treatment plan  They have identified and are working toward their discharge goals   yes    Visit start and stop times:    05/18/23  Start Time: 1402  Stop Time: 1447  Total Visit Time: 45 minutes

## 2023-06-01 ENCOUNTER — TELEMEDICINE (OUTPATIENT)
Dept: BEHAVIORAL/MENTAL HEALTH CLINIC | Facility: CLINIC | Age: 23
End: 2023-06-01

## 2023-06-01 DIAGNOSIS — F32.A DEPRESSION, UNSPECIFIED DEPRESSION TYPE: ICD-10-CM

## 2023-06-01 DIAGNOSIS — F41.1 GENERALIZED ANXIETY DISORDER: Primary | ICD-10-CM

## 2023-06-01 NOTE — PSYCH
Virtual Regular Visit    Verification of patient location:    Patient is located at Home in the following state in which I hold an active license [place]      Assessment/Plan:    Problem List Items Addressed This Visit        Other    Generalized anxiety disorder - Primary    Depression       Goals addressed in session: Goal 1 and Goal 2          Reason for visit is No chief complaint on file  Encounter provider Julio C Ma    Provider located at [address] [place] [id number] [phone]      Recent Visits  No visits were found meeting these conditions  Showing recent visits within past 7 days and meeting all other requirements  Today's Visits  Date Type Provider Dept   06/01/23 Telemedicine [facility]   Showing today's visits and meeting all other requirements  Future Appointments  No visits were found meeting these conditions  Showing future appointments within next 150 days and meeting all other requirements       The patient was identified by name and date of birth  Vision Greg Cifuentes was informed that this is a telemedicine visit and that the visit is being conducted [facility]  She agrees to proceed     My office door was closed  No one else was in the room  She acknowledged consent and understanding of privacy and security of the video platform  The patient has agreed to participate and understands they can discontinue the visit at any time  Patient is aware this is a billable service  Subjective  Vision Tomy Saha is a 26 y o  female          HPI     Past Medical History:   Diagnosis Date   • Anxiety    • Depression 02/01/2022   • FTND (full term normal delivery) 2001   • Herniated disc, cervical     9/2022   • 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 - has EKG done every 2 years   • Severe menstrual cramps • Visual impairment    • Yeast infection        Past Surgical History:   Procedure Laterality Date   • NO PAST SURGERIES         Current Outpatient Medications   Medication Sig Dispense Refill   • clobetasol (TEMOVATE) 0 05 % ointment Apply topically 2 (two) times a day (Patient not taking: Reported on 2/3/2023) 30 g 0   • cyclobenzaprine (FLEXERIL) 10 mg tablet Take 0 5 tablets (5 mg total) by mouth 3 (three) times a day as needed for muscle spasms (Patient not taking: Reported on 9/29/2022) 20 tablet 0   • diphenhydrAMINE (BENADRYL) 25 mg tablet Take 2 tablets (50 mg total) by mouth every 6 (six) hours (Patient not taking: Reported on 5/1/2023) 20 tablet 0   • drospirenone-ethinyl estradiol (RAFIA) 3-0 02 MG per tablet Take 1 tablet by mouth daily 28 tablet 3   • etonogestrel (NEXPLANON) subdermal implant Inject 68 mg under the skin (Patient not taking: Reported on 5/1/2023)     • ibuprofen (MOTRIN) 600 mg tablet Take 1 tablet (600 mg total) by mouth every 8 (eight) hours as needed for moderate pain (Patient not taking: Reported on 11/8/2022) 30 tablet 0   • lidocaine (LIDODERM) 5 % Apply 1 patch topically every 24 hours for 7 days Remove & Discard patch within 12 hours or as directed by MD 7 patch 0   • loratadine (CLARITIN) 10 mg tablet Take 1 tablet (10 mg total) by mouth daily 30 tablet 3   • methocarbamol (ROBAXIN) 500 mg tablet Take 1 tablet (500 mg total) by mouth 2 (two) times a day for 7 days (Patient not taking: Reported on 11/8/2022) 14 tablet 0   • phenazopyridine (PYRIDIUM) 95 MG tablet Take 1 tablet (95 mg total) by mouth 3 (three) times a day as needed for bladder spasms 10 tablet 0     No current facility-administered medications for this visit  No Known Allergies    Review of Systems    Video Exam    There were no vitals filed for this visit  Physical Exam     Visit Time    Behavioral Health Psychotherapy Progress Note    Psychotherapy Provided: Individual Psychotherapy     1  "Generalized anxiety disorder        2  Depression, unspecified depression type            Goals addressed in session: Goal 1 and Goal 2     DATA: Clinician met with [facility] via telehealth for individual therapy  Vision reports that although she continues to utilize coping skills which include regular exercise, eating well, getting enough sleep and taking time to engage in social interaction with friends that she continues to experience depressive symptoms daily and reports that she has decided to follow through with going to see her PCP  Vision reports that she has an appointment at the beginning of July and was placed on a wait list  Clinician praised and encouraged reaching out for help and explored current symptoms  Vision discussed plans to continued utilizing self care and coping skills and was able to identify social support she can reach out to if needed  During this session, this clinician used the following therapeutic modalities: Client-centered Therapy and Supportive Psychotherapy    Substance Abuse was not addressed during this session  If the client is diagnosed with a co-occurring substance use disorder, please indicate any changes in the frequency or amount of use: n/a  Stage of change for addressing substance use diagnoses: No substance use/Not applicable    ASSESSMENT:  Jules Enciso Rather presents with a Depressed mood  her affect is Normal range and intensity, which is congruent, with her mood and the content of the session  The client has made progress on their goals  Vision was open and engaged in the session  Jules Finney presents with a none risk of suicide, none risk of self-harm, and none risk of harm to others  For any risk assessment that surpasses a \"low\" rating, a safety plan must be developed      A safety plan was indicated: no  If yes, describe in detail n/a    PLAN: Between sessions, Jules Finney will continue to utilize coping skills and follow through " with PCP  At the next session, the therapist will use Client-centered Therapy to address depressive symptoms  Behavioral Health Treatment Plan and Discharge Planning: Ming Roberts is aware of and agrees to continue to work on their treatment plan  They have identified and are working toward their discharge goals   yes    Visit start and stop times:    06/01/23  Start Time: 6795  Stop Time: 1346  Total Visit Time: 43 minutes

## 2023-06-15 ENCOUNTER — TELEMEDICINE (OUTPATIENT)
Dept: BEHAVIORAL/MENTAL HEALTH CLINIC | Facility: CLINIC | Age: 23
End: 2023-06-15
Payer: COMMERCIAL

## 2023-06-15 DIAGNOSIS — F32.A DEPRESSION, UNSPECIFIED DEPRESSION TYPE: ICD-10-CM

## 2023-06-15 DIAGNOSIS — F41.1 GENERALIZED ANXIETY DISORDER: Primary | ICD-10-CM

## 2023-06-15 PROCEDURE — 90834 PSYTX W PT 45 MINUTES: CPT | Performed by: COUNSELOR

## 2023-06-15 NOTE — PSYCH
Virtual Regular Visit    Verification of patient location:    Patient is located at Home in the following state in which I hold an active license [place]      Assessment/Plan:    Problem List Items Addressed This Visit        Other    Generalized anxiety disorder - Primary    Depression       Goals addressed in session: Goal 1 and Goal 2          Reason for visit is   Chief Complaint   Patient presents with   • Virtual Regular Visit        Encounter provider Didier Leyva    Provider located at [address] [place] [address] Maxine  [facility] [id number] [phone]      Recent Visits  No visits were found meeting these conditions  Showing recent visits within past 7 days and meeting all other requirements  Today's Visits  Date Type Provider Dept   06/15/23 Telemedicine [facility]   Showing today's visits and meeting all other requirements  Future Appointments  No visits were found meeting these conditions  Showing future appointments within next 150 days and meeting all other requirements       The patient was identified by name and date of birth  Jules Greg Antoine was informed that this is a telemedicine visit and that the visit is being [facility]  She agrees to proceed     My office door was closed  No one else was in the room  She acknowledged consent and understanding of privacy and security of the video platform  The patient has agreed to participate and understands they can discontinue the visit at any time  Patient is aware this is a billable service       Subjective  Vision Lynnette Butler is a 26 y o  female       HPI     Past Medical History:   Diagnosis Date   • Anxiety    • Depression 02/01/2022   • FTND (full term normal delivery) 2001   • Herniated disc, cervical     9/2022   • 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 - has EKG done every 2 years   • Severe menstrual cramps    • Visual impairment    • Yeast infection        Past Surgical History:   Procedure Laterality Date   • NO PAST SURGERIES         Current Outpatient Medications   Medication Sig Dispense Refill   • clobetasol (TEMOVATE) 0 05 % ointment Apply topically 2 (two) times a day (Patient not taking: Reported on 2/3/2023) 30 g 0   • cyclobenzaprine (FLEXERIL) 10 mg tablet Take 0 5 tablets (5 mg total) by mouth 3 (three) times a day as needed for muscle spasms (Patient not taking: Reported on 9/29/2022) 20 tablet 0   • diphenhydrAMINE (BENADRYL) 25 mg tablet Take 2 tablets (50 mg total) by mouth every 6 (six) hours (Patient not taking: Reported on 5/1/2023) 20 tablet 0   • drospirenone-ethinyl estradiol (RAFIA) 3-0 02 MG per tablet Take 1 tablet by mouth daily 28 tablet 3   • etonogestrel (NEXPLANON) subdermal implant Inject 68 mg under the skin (Patient not taking: Reported on 5/1/2023)     • ibuprofen (MOTRIN) 600 mg tablet Take 1 tablet (600 mg total) by mouth every 8 (eight) hours as needed for moderate pain (Patient not taking: Reported on 11/8/2022) 30 tablet 0   • lidocaine (LIDODERM) 5 % Apply 1 patch topically every 24 hours for 7 days Remove & Discard patch within 12 hours or as directed by MD 7 patch 0   • loratadine (CLARITIN) 10 mg tablet Take 1 tablet (10 mg total) by mouth daily 30 tablet 3   • methocarbamol (ROBAXIN) 500 mg tablet Take 1 tablet (500 mg total) by mouth 2 (two) times a day for 7 days (Patient not taking: Reported on 11/8/2022) 14 tablet 0   • phenazopyridine (PYRIDIUM) 95 MG tablet Take 1 tablet (95 mg total) by mouth 3 (three) times a day as needed for bladder spasms 10 tablet 0     No current facility-administered medications for this visit  No Known Allergies    Review of Systems    Video Exam    There were no vitals filed for this visit      Physical Exam     Behavioral Health Psychotherapy Progress Note    Psychotherapy Provided: Individual "Psychotherapy     1  Generalized anxiety disorder        2  Depression, unspecified depression type            Goals addressed in session: Goal 1 and Goal 2     DATA: Clinician met with [facility] via telehealth for individual therapy  Vision discussed continue symptoms of depression and anxiety which she states make her feel like she is on a roller coaster  Clinician explored feelings and use of coping skills  She reports that she has not been to the gym for about two weeks and has been laying in when she is not at class  She reports feeling as if she has been dissociating a few times a week  Clinician normalized depressive feelings but encouraged her to follow up on seeing her PCP about the severity of symptoms  She denies SI/HI, plan or intent and was able to make plans for increased social support  During this session, this clinician used the following therapeutic modalities: Client-centered Therapy and Supportive Psychotherapy    Substance Abuse was not addressed during this session  If the client is diagnosed with a co-occurring substance use disorder, please indicate any changes in the frequency or amount of use: n/a  Stage of change for addressing substance use diagnoses: No substance use/Not applicable    ASSESSMENT:  Jules Shah presents with a Depressed mood  her affect is Normal range and intensity, which is congruent, with her mood and the content of the session  The client has made progress on their goals  Vision was open and engaged in the session  Jules Shah presents with a none risk of suicide, none risk of self-harm, and none risk of harm to others  For any risk assessment that surpasses a \"low\" rating, a safety plan must be developed  A safety plan was indicated: no  If yes, describe in detail n/a    PLAN: Between sessions, Jules Shah will follow through with PCP appointment and utilize self care   At the next session, the therapist will use Client-centered " Therapy and Supportive Psychotherapy to address depressive symptoms and anxiety  Behavioral Health Treatment Plan and Discharge Planning: Cleotis Jeans is aware of and agrees to continue to work on their treatment plan  They have identified and are working toward their discharge goals   yes    Visit start and stop times:    06/15/23  Start Time: 1315  Stop Time: 1355  Total Visit Time: 40 minutes

## 2023-06-21 ENCOUNTER — DOCUMENTATION (OUTPATIENT)
Dept: BEHAVIORAL/MENTAL HEALTH CLINIC | Facility: CLINIC | Age: 23
End: 2023-06-21

## 2023-06-21 ENCOUNTER — OFFICE VISIT (OUTPATIENT)
Dept: BEHAVIORAL/MENTAL HEALTH CLINIC | Facility: CLINIC | Age: 23
End: 2023-06-21
Payer: COMMERCIAL

## 2023-06-21 DIAGNOSIS — F41.1 GENERALIZED ANXIETY DISORDER: Primary | ICD-10-CM

## 2023-06-21 PROCEDURE — H2021 COM WRAP-AROUND SV, 15 MIN: HCPCS

## 2023-06-21 NOTE — PROGRESS NOTES
CIS received a phone call from [address] [place] [address] who was with her director at the [place] [id number]  Patient resides there and also works at [employer]  Patient was tearful and stated that she has been struggling with Anxiety and Depression she is currently doing teletherapy  Patient stated that she is currently on break from school and thought that the break would help but she is still experiencing panic attacks and racing thoughts  Patient reports that she is not suicidal with a plan but has experienced racing thoughts of not wanting to be here  Patient is currently able to contract for safety as she is with a support but patient feels she would like to start medications  Patient is planning to come to the walk in [facility] to be evaluated by roderick after having an ongoing increase in anxiety where she can not calm down

## 2023-06-21 NOTE — PROGRESS NOTES
Assessment      Crisis Intake  Patient Intake  Special Needs: N/A  Living Arrangement: Other (Comment) (Living in a dorm at [address] [employer] school )  Can patient return home?: Yes  Address to be Discharge to[de-identified] [address] [facility], [address] [place] [address]  Patient's Telephone Number: see chart  Access to Firearms: No  Type of Work: student  Work History: Student  School Name: [email or web address] Grade/Year: [address] [place] [address] sophomore  Unemployed / [address] applicant[de-identified] N/A  Patient History  Presenting Problems: Patient presents to the walk-in center at the request of her therapist and school staff after an increase in concerning symptoms over the last three months, Patient states that she has experienced preoccupations, racing thoughts and constantly worrying about things  Patient has had fleeting thoughts of suicide without a plan  Patient denies these feelings today but describes regularly having thoughts of “not wanting to be here ” Patient states that this has affected her ability to not only function in the educational setting but also in her day-to-day life  Patient states that she “loses track of time” and that “days are foggy ”  Patient will sometimes spend up to 15 hours in bed scrolling through her phone  When patient does find the strength to attend to her daily schedule, she returns home completely exhausted and overwhelmed  When in class, patient is unable to focus due to the constant cycling of thoughts  These symptoms have been noticed by staff within her school which led to her being referred to the walk-in center  Patient was tearful throughout today’s discussion  Patient shared that when her anxiety prevents her from completing daily tasks, she will often degrade herself  Patient spends much time researching what is causing her distress which she says leads to additional feelings of frustration and being overwhelmed    Patient described feeling as though she was in a cycle of anxiety and fear which lead to depression and self-degradation  Patient is being treated by a therapist through [facility] in [place] but feels that more treatment is needed at this time  Patient shared that she has family members who have struggled with a variety of mental health issues including Bipolar Disorder and Borderline Personality Disorder  Patient is not active with a psychiatrist and is not taking any medications  Patient denies any current or historical drug and alcohol abuse   Malik Phan scheduled patient for an appointment with Dr Carlos Adan on 6/22/23 at 1pm pending approval  Case management will touch base with patient tomorrow morning to confirm  Patient was able to contract for safety and was comfortable returning to her dorm tonight with a plan to return tomorrow  Patient was provided with resource information for [facility], case management, outpatient services, and the walk-in center  Treatment History: patient has therapy through [facility] in [place]  Currently in Treatment: Yes  Current Treatment Appt Info: therapy on 6/29/2023  Name of ICM[de-identified] N/A  ICM Phone Number[de-identified] N/A  Community Agency Supports: N/A  Medical Problems: denies  Legal Issues: denies  Probation/ Name (if applicable): N/A  Substance Abuse: No  Mental Status Exam  Orientation Level: Oriented X4  Affect: Appropriate  Speech: Soft, Logical/coherent, Slow  Mood: Depressed, Despairing, Anxious  Thought Content: Paranoia, Obsessions, Preoccupations, Suicidal ideation  Hallucination Type: No problems reported or observed  Judgement: Fair  Impulse Control: Fair  Attention Span: Easily distracted (Patient openly shared that her racing thoughts and preoccupations do affect her ability to focus and remain in the moment )  Memory: Impaired  Appetite: Fair  Weight Changes: denies  Sleep: Poor  Total Hours of Sleep: varies, typically 7 hours    Specific Sleep Problem: Variation between sleeping "all day and finding herself awake all night  Appear/Hygiene: Neatly Groomed  ADL Comments: Independent  Strengths and Limitations  Patient Strengths: Motivated, Physically healthy, Cooperative, Resourceful, Good support system, Reasoning ability, Well educated, Self reliant, Insightful, Interpersonal skills, Family ties, Negotiates basic needs, Sense of humor, Good past treatment response  Patient Limitations: Difficulty adapting  Ideations  Current Self Harm/Suicidal Ideation: No (Denies SI today but has experienced fleeting thoughts of \"not wanting to be here\" in recent days  Denies ever having a plan to harm self or others )  Previous Self Harm/Suicidal Ideation: Yes  Description of self harming behaviors or thoughts[de-identified] Denies SI today but has experienced fleeting thoughts of \"not wanting to be here\" in recent days  Denies ever having a plan to harm self or others  Violence Risk to Self: Yes- Within the past 6 months  Current homicidal or violent thoughts toward another: Denies ideations within past 6 months  Description of current thoughts/plans[de-identified] N/A  Previous Plans to Harm Another Person: No  Description of violent thoughts or behaviors toward others[de-identified] N/A  Violence Risk to Others: Denies within past 6 months  Previous History of Violence to Others: No  Provisional Diagnosis  Axis I: Anxiety    C-SSRS  [place] Suicide Severity Rating Scale  C-SSRS Q1   Wish to be Dead: Yes - In the Past Month  C-SSRS Q3  Suicidal Thoughts with Method (without Specific Plan or Intent to Act): No - In the Past Month  C-SSRS Q4  Suicidal Intent (without Specific Plan): Yes - In the Past Month  C-SSRS Q5  Suicide Intent with Specific Plan: No - In the Past Month  C-SSRS Q6  Suicide Behavior Question: No  *Risk Level*: High Risk      Patient was referred by: Self or Family    Visit start and stop times:    06/21/23           Discharge and Safety        The patient was Instructed to follow up with their psychiatrist and therapist  " The patient was provided with referral information for:   Outpatient services, [facility], case mgmt, walk-in center  This writer and the patient completed a safety plan   The patient was provided with a copy of their safety plan with encouragement to utilize the plan following discharge  In addition, the patient was instructed to call local [facility] crisis, other crisis services, [facility] or to go to the nearest ER immediately if their situation changes at any time  This writer discussed discharge plans with the patient who agrees with and understands the discharge plans      SAFETY PLAN  Warning Signs (thoughts, images, mood, behavior, situations) of a potential crisis: negative self talk, feelings of not wanting to be alive, increased anxiety and fearfulness  Coping Skills (what can I do to take my mind off the problem, or to keep myself safe): listen to music, visit with classmates in dorm, speak with family on phone      Outside Support (who can I reach out to for support and help): [facility], walk-in center, case mgmt          National Suicide Prevention Hotline:  6-747.104.3055    [facility] [address] [facility] [phone] - F Crisis/Mobile Crisis   [address]: [phone]  [facility]: [address] [facility] [address] [facility] [phone] - Crisis   [phone] - Peer Support Talk Line (1-9pm daily)  [phone] - Teen Support Talk Line (1-9pm daily)  [address] [facility] [address] [facility] [address]  ([facility]) [phone] - [address] [facility]

## 2023-06-22 ENCOUNTER — OFFICE VISIT (OUTPATIENT)
Dept: PSYCHIATRY | Facility: CLINIC | Age: 23
End: 2023-06-22
Payer: COMMERCIAL

## 2023-06-22 ENCOUNTER — DOCUMENTATION (OUTPATIENT)
Dept: BEHAVIORAL/MENTAL HEALTH CLINIC | Facility: CLINIC | Age: 23
End: 2023-06-22

## 2023-06-22 DIAGNOSIS — F33.1 MDD (MAJOR DEPRESSIVE DISORDER), RECURRENT EPISODE, MODERATE (HCC): ICD-10-CM

## 2023-06-22 DIAGNOSIS — F41.9 ANXIETY: ICD-10-CM

## 2023-06-22 DIAGNOSIS — F41.1 GAD (GENERALIZED ANXIETY DISORDER): Primary | ICD-10-CM

## 2023-06-22 PROCEDURE — 90792 PSYCH DIAG EVAL W/MED SRVCS: CPT | Performed by: STUDENT IN AN ORGANIZED HEALTH CARE EDUCATION/TRAINING PROGRAM

## 2023-06-22 RX ORDER — PROPRANOLOL HYDROCHLORIDE 10 MG/1
10 TABLET ORAL 2 TIMES DAILY PRN
Qty: 30 TABLET | Refills: 1 | Status: SHIPPED | OUTPATIENT
Start: 2023-06-22

## 2023-06-22 NOTE — BH TREATMENT PLAN
TREATMENT PLAN (Medication Management Only)        [employer]    Name and Date of Birth:  Jules Jones 26 y o  2000  Date of Treatment Plan: June 22, 2023  Diagnosis/Diagnoses:    1  MAIN (generalized anxiety disorder)    2  MDD (major depressive disorder), recurrent episode, moderate ([facility] [address] )      Strengths/Personal Resources for Self-Care: supportive friends, taking medications as prescribed, average or above intelligence, independence, motivation for treatment, ability to negotiate basic needs, strong rasta, well educated  Area/Areas of need (in own words): anxiety symptoms, depressive symptoms, low self-esteem, time management  1  Long Term Goal: improve control of anxiety  Target Date:6 months - 12/22/2023  Person/Persons responsible for completion of goal: family  2  Short Term Objective (s) - How will we reach this goal?:   A  Provider new recommended medication/dosage changes and/or continue medication(s): continue current medications as prescribed Zoloft, Atarax, Propranolol  B  Attend medication management appointments regularly  C  Attend psychotherapy regularly  Target Date:6 months - 12/22/2023  Person/Persons Responsible for Completion of Goal: family  Progress Towards Goals: starting treatment  Treatment Modality: medication management with psychotherapy every 1 months  Review due 180 days from date of this plan: 6 months - 12/22/2023  Expected length of service: ongoing treatment  My Physician and I have developed this plan together and I agree to work on the goals and objectives  I understand the treatment goals that were developed for my treatment

## 2023-06-23 ENCOUNTER — HOSPITAL ENCOUNTER (EMERGENCY)
Facility: HOSPITAL | Age: 23
Discharge: HOME | End: 2023-06-24
Attending: EMERGENCY MEDICINE
Payer: MEDICARE

## 2023-06-23 ENCOUNTER — TELEPHONE (OUTPATIENT)
Dept: BEHAVIORAL/MENTAL HEALTH CLINIC | Facility: CLINIC | Age: 23
End: 2023-06-23

## 2023-06-23 VITALS
HEART RATE: 70 BPM | RESPIRATION RATE: 18 BRPM | SYSTOLIC BLOOD PRESSURE: 150 MMHG | OXYGEN SATURATION: 99 % | TEMPERATURE: 97.9 F | DIASTOLIC BLOOD PRESSURE: 82 MMHG

## 2023-06-23 DIAGNOSIS — F41.9 ANXIETY: Primary | ICD-10-CM

## 2023-06-23 PROCEDURE — 81025 URINE PREGNANCY TEST: CPT

## 2023-06-23 PROCEDURE — 93005 ELECTROCARDIOGRAM TRACING: CPT

## 2023-06-23 PROCEDURE — 99284 EMERGENCY DEPT VISIT MOD MDM: CPT

## 2023-06-23 RX ORDER — LORAZEPAM 1 MG/1
1 TABLET ORAL ONCE
Status: COMPLETED | OUTPATIENT
Start: 2023-06-23 | End: 2023-06-23

## 2023-06-23 RX ADMIN — LORAZEPAM 1 MG: 1 TABLET ORAL at 23:57

## 2023-06-23 NOTE — TELEPHONE ENCOUNTER
"Telephone call from PT regarding symptoms PT feels she is having from newly prescribed medication  PT reports taking Zoloft in the am and having a burning sensation in her chest and shoulders since the am with a pins and needles feeling in her arms and back  CM recommended that due to the symptoms PT is exhibiting PT should visit a local ED for evaluation  PT reports taking propranolol \"a few hours ago\" due to feeling tremors coming on earlier  PT feels this medication was effective  PT agreed to go to a local ED for evaluation upon telephone call ending     "

## 2023-06-24 LAB
EXT PREGNANCY TEST URINE: NEGATIVE
EXT. CONTROL: NORMAL

## 2023-06-24 RX ORDER — HYDROXYZINE HYDROCHLORIDE 25 MG/1
25 TABLET, FILM COATED ORAL EVERY 6 HOURS
Qty: 12 TABLET | Refills: 0 | Status: SHIPPED | OUTPATIENT
Start: 2023-06-24 | End: 2023-06-26 | Stop reason: SDUPTHER

## 2023-06-24 NOTE — ED ATTENDING ATTESTATION
6/23/2023  IMary DO, saw and evaluated the patient  I have discussed the patient with the resident/non-physician practitioner and agree with the resident's/non-physician practitioner's findings, Plan of Care, and MDM as documented in the resident's/non-physician practitioner's note, except where noted  All available labs and Radiology studies were reviewed  I was present for key portions of any procedure(s) performed by the resident/non-physician practitioner and I was immediately available to provide assistance  At this point I agree with the current assessment done in the Emergency Department  I have conducted an independent evaluation of this patient a history and physical is as follows:    78-year-old female with anxiety worsening started on Zoloft yesterday feels palpitations and chest pain  No suicidal homicidal patient is very anxious  Is requesting Atarax or Ativan  She has no auditory or visual loose Nations  Normal exam right now    We will offer partial services, 1 dose of Ativan    ED Course         Critical Care Time  Procedures

## 2023-06-24 NOTE — DISCHARGE INSTRUCTIONS
Atarax was sent to your pharmacy, use as prescribed for anxiety  Follow up with your psychiatrist    If you develop new or worsening symptoms, please return to the Emergency Department for further evaluation

## 2023-06-24 NOTE — ED PROVIDER NOTES
History  Chief Complaint   Patient presents with   • Chest Pain     Recently started Zoloft, recent panic attacks, took propanolol which didn't help so she called oncall psychiatrist for prn  Informed them of CP which has been occurring all day, pressure radiating to BL shoulders and neck, lightheaded  HPI     Patient is a 27 y/o F with pmh anxiety presenting with ongoing anxiety  Pt states she was recently started on zoloft and propranolol by her psychiatrist for severe anxiety  States last night and all day she has been unable to settle down and has had chest discomfort associated  States her psychiatrist told her to come to the ED for this  She believes her symptoms are all related to her anxiety  She does endorse depression but no SI/HI/AH/VH  Denies fever, chills, SOB, n/v/d, cough, urinary symptoms  Prior to Admission Medications   Prescriptions Last Dose Informant Patient Reported? Taking?    clobetasol (TEMOVATE) 0 05 % ointment   No No   Sig: Apply topically 2 (two) times a day   Patient not taking: Reported on 2/3/2023   cyclobenzaprine (FLEXERIL) 10 mg tablet   No No   Sig: Take 0 5 tablets (5 mg total) by mouth 3 (three) times a day as needed for muscle spasms   Patient not taking: Reported on 9/29/2022   diphenhydrAMINE (BENADRYL) 25 mg tablet   No No   Sig: Take 2 tablets (50 mg total) by mouth every 6 (six) hours   Patient not taking: Reported on 5/1/2023   drospirenone-ethinyl estradiol (RAFIA) 3-0 02 MG per tablet   No No   Sig: Take 1 tablet by mouth daily   Patient not taking: Reported on 6/22/2023   etonogestrel (NEXPLANON) subdermal implant   Yes No   Sig: Inject 68 mg under the skin   Patient not taking: Reported on 5/1/2023   ibuprofen (MOTRIN) 600 mg tablet   No No   Sig: Take 1 tablet (600 mg total) by mouth every 8 (eight) hours as needed for moderate pain   Patient not taking: Reported on 11/8/2022   lidocaine (LIDODERM) 5 %   No No   Sig: Apply 1 patch topically every 24 hours for 7 days Remove & Discard patch within 12 hours or as directed by MD   loratadine (CLARITIN) 10 mg tablet   No No   Sig: Take 1 tablet (10 mg total) by mouth daily   methocarbamol (ROBAXIN) 500 mg tablet   No No   Sig: Take 1 tablet (500 mg total) by mouth 2 (two) times a day for 7 days   Patient not taking: Reported on 11/8/2022   phenazopyridine (PYRIDIUM) 95 MG tablet   No No   Sig: Take 1 tablet (95 mg total) by mouth 3 (three) times a day as needed for bladder spasms   propranolol (INDERAL) 10 mg tablet 6/23/2023  No Yes   Sig: Take 1 tablet (10 mg total) by mouth 2 (two) times a day as needed (Take 1 tablet up to twice per day 30 minutes before presentation as needed) for up to 60 doses   sertraline (Zoloft) 50 mg tablet 6/23/2023  No Yes   Sig: Take 1 tablet (50 mg total) by mouth daily      Facility-Administered Medications: None       Past Medical History:   Diagnosis Date   • Anxiety    • Depression 02/01/2022   • FTND (full term normal delivery) 2001   • Herniated disc, cervical     9/2022   • 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 - has EKG done every 2 years   • Severe menstrual cramps    • Visual impairment    • Yeast infection        Past Surgical History:   Procedure Laterality Date   • NO PAST SURGERIES         Family History   Problem Relation Age of Onset   • No Known Problems Mother    • No Known Problems Father    • No Known Problems Sister    • No Known Problems Brother    • No Known Problems Sister    • No Known Problems Sister    • No Known Problems Brother    • No Known Problems Brother    • Cancer Family         colon   • Breast cancer Paternal Grandmother    • Diabetes Paternal Grandmother    • Hypertension Maternal Grandmother      I have reviewed and agree with the history as documented      E-Cigarette/Vaping   • E-Cigarette Use Former User      E-Cigarette/Vaping Substances   • Nicotine Yes    • THC No    • CBD No    • Flavoring No    • Other No    • Unknown No      Social History     Tobacco Use   • Smoking status: Never   • Smokeless tobacco: Never   Vaping Use   • Vaping Use: Former   • Substances: Nicotine   Substance Use Topics   • Alcohol use: Yes     Alcohol/week: 1 0 standard drink of alcohol     Types: 1 Standard drinks or equivalent per week     Comment: social   • Drug use: Not Currently     Types: Marijuana     Comment: Occ        Review of Systems   Constitutional: Negative for chills and fever  HENT: Negative for ear pain and sore throat  Eyes: Negative for pain and visual disturbance  Respiratory: Negative for cough and shortness of breath  Cardiovascular: Positive for chest pain  Negative for palpitations  Gastrointestinal: Negative for abdominal pain and vomiting  Genitourinary: Negative for dysuria and hematuria  Musculoskeletal: Negative for arthralgias and back pain  Skin: Negative for color change and rash  Neurological: Negative for seizures and syncope  Psychiatric/Behavioral: The patient is nervous/anxious  All other systems reviewed and are negative  Physical Exam  ED Triage Vitals [06/23/23 2245]   Temperature Pulse Respirations Blood Pressure SpO2   97 9 °F (36 6 °C) 70 18 150/82 99 %      Temp src Heart Rate Source Patient Position - Orthostatic VS BP Location FiO2 (%)   -- -- -- -- --      Pain Score       6             Orthostatic Vital Signs  Vitals:    06/23/23 2245   BP: 150/82   Pulse: 70       Physical Exam  Vitals and nursing note reviewed  Constitutional:       General: She is not in acute distress  Appearance: She is not ill-appearing  HENT:      Head: Normocephalic and atraumatic  Right Ear: External ear normal       Left Ear: External ear normal       Nose: Nose normal       Mouth/Throat:      Pharynx: Oropharynx is clear  Eyes:      Extraocular Movements: Extraocular movements intact  Pupils: Pupils are equal, round, and reactive to light     Cardiovascular:      Rate and Rhythm: Normal rate and regular rhythm  Pulses: Normal pulses  Heart sounds: Normal heart sounds  No murmur heard  No friction rub  No gallop  Pulmonary:      Effort: Pulmonary effort is normal  No respiratory distress  Breath sounds: Normal breath sounds  No decreased breath sounds, wheezing, rhonchi or rales  Abdominal:      General: Abdomen is flat  There is no distension  Palpations: Abdomen is soft  Tenderness: There is no abdominal tenderness  Musculoskeletal:         General: No deformity  Normal range of motion  Cervical back: Normal range of motion  Right lower leg: No tenderness  No edema  Left lower leg: No tenderness  No edema  Skin:     General: Skin is warm and dry  Capillary Refill: Capillary refill takes less than 2 seconds  Findings: No rash  Neurological:      General: No focal deficit present  Mental Status: She is alert and oriented to person, place, and time  Gait: Gait normal    Psychiatric:         Mood and Affect: Mood is anxious  ED Medications  Medications   LORazepam (ATIVAN) tablet 1 mg (1 mg Oral Given 6/23/23 2357)       Diagnostic Studies  Results Reviewed     Procedure Component Value Units Date/Time    POCT pregnancy, urine [[id number]]  (Normal) Resulted: 06/24/23 0004    Lab Status: Final result Updated: 06/24/23 0004     EXT Preg Test, Ur Negative     Control Valid                 No orders to display         Procedures  Procedures      ED Course  ED Course as of 06/24/23 0216   Fri Jun 23, 2023 2319 ECG 12 lead  Procedure Note: EKG  Date/Time: 06/23/23 11:20 PM   Interpreted by: Mine Chavez MD  Indications / Diagnosis: CP  ECG reviewed by me, the ED Provider: yes   The EKG demonstrates:  Rhythm: sinus with sinus arrhythmia   Intervals: normal intervals  Axis: normal axis  QRS/Blocks: normal QRS  ST Changes: No acute ST Changes, no STD/TABATHA                                 SBIRT 20yo+    Flowsheet Row Most Recent Value Initial Alcohol Screen: US AUDIT-C     1  How often do you have a drink containing alcohol? 1 Filed at: 06/23/2023 2244   2  How many drinks containing alcohol do you have on a typical day you are drinking? 1 Filed at: 06/23/2023 2244   3b  FEMALE Any Age, or MALE 65+: How often do you have 4 or more drinks on one occassion? 1 Filed at: 06/23/2023 2244   Audit-C Score 3 Filed at: 06/23/2023 2244   JEN: How many times in the past year have you    Used an illegal drug or used a prescription medication for non-medical reasons? Never Filed at: 06/23/2023 2244                Medical Decision Making  25 y/o F presenting with anxiety  Triage complaint of chest pain however agree with pt symptoms as described most likely related to anxiety  EKG normal, urine pregnancy negative  Will trial ativan  Discussed partial hospitalization which pt was not interested in  Pt somewhat improved with ativan, wants to go home  Will send with atarax  Amount and/or Complexity of Data Reviewed  Labs: ordered  ECG/medicine tests:  Decision-making details documented in ED Course  Risk  Prescription drug management  Disposition  Final diagnoses:   Anxiety     Time reflects when diagnosis was documented in both MDM as applicable and the Disposition within this note     Time User Action Codes Description Comment    6/24/2023 12:52 AM Hayde Bueno Add [F41 9] Anxiety       ED Disposition     ED Disposition   Discharge    Condition   Stable    Date/Time   Sat Jun 24, 2023 12:52 AM    Comment   Jules Sykes discharge to home/self care                 Follow-up Information     Follow up With Specialties Details Why Contact Info Additional [address] Emergency Department Emergency Medicine  If symptoms worsen [address] [id number] [address] Emergency Department, [address], [address] [phone]          Discharge Medication List as of 6/24/2023  1:00 AM      START taking these medications    Details   hydrOXYzine HCL (ATARAX) 25 mg tablet Take 1 tablet (25 mg total) by mouth every 6 (six) hours, Starting Sat 6/24/2023, Normal         CONTINUE these medications which have NOT CHANGED    Details   clobetasol (TEMOVATE) 0 05 % ointment Apply topically 2 (two) times a day, Starting Tue 10/11/2022, Normal      cyclobenzaprine (FLEXERIL) 10 mg tablet Take 0 5 tablets (5 mg total) by mouth 3 (three) times a day as needed for muscle spasms, Starting Mon 8/22/2022, Normal      diphenhydrAMINE (BENADRYL) 25 mg tablet Take 2 tablets (50 mg total) by mouth every 6 (six) hours, Starting Sat 12/10/2022, Normal      drospirenone-ethinyl estradiol (RAFIA) 3-0 02 MG per tablet Take 1 tablet by mouth daily, Starting Fri 2/3/2023, Normal      etonogestrel (NEXPLANON) subdermal implant Inject 68 mg under the skin, Historical Med      ibuprofen (MOTRIN) 600 mg tablet Take 1 tablet (600 mg total) by mouth every 8 (eight) hours as needed for moderate pain, Starting Wed 9/7/2022, Normal      lidocaine (LIDODERM) 5 % Apply 1 patch topically every 24 hours for 7 days Remove & Discard patch within 12 hours or as directed by MD, Starting Wed 9/7/2022, Until Wed 9/14/2022, Print      loratadine (CLARITIN) 10 mg tablet Take 1 tablet (10 mg total) by mouth daily, Starting Mon 8/22/2022, Normal      methocarbamol (ROBAXIN) 500 mg tablet Take 1 tablet (500 mg total) by mouth 2 (two) times a day for 7 days, Starting Wed 9/7/2022, Until Wed 9/14/2022, Normal      phenazopyridine (PYRIDIUM) 95 MG tablet Take 1 tablet (95 mg total) by mouth 3 (three) times a day as needed for bladder spasms, Starting Mon 5/1/2023, Normal      propranolol (INDERAL) 10 mg tablet Take 1 tablet (10 mg total) by mouth 2 (two) times a day as needed (Take 1 tablet up to twice per day 30 minutes before presentation as needed) for up to 60 doses, Starting Thu 6/22/2023, Normal      sertraline (Zoloft) 50 mg tablet Take 1 tablet (50 mg total) by mouth daily, Starting Thu 6/22/2023, Until Mon 8/21/2023, Normal           No discharge procedures on file  PDMP Review     None           ED Provider  Attending physically available and evaluated Vision Maya Hunt I managed the patient along with the ED Attending      Electronically Signed by         Gissell Lopes MD  06/24/23 4099

## 2023-06-25 LAB
ATRIAL RATE: 59 BPM
P AXIS: 24 DEGREES
PR INTERVAL: 134 MS
QRS AXIS: 87 DEGREES
QRSD INTERVAL: 74 MS
QT INTERVAL: 408 MS
QTC INTERVAL: 403 MS
T WAVE AXIS: 66 DEGREES
VENTRICULAR RATE: 59 BPM

## 2023-06-25 PROCEDURE — 93010 ELECTROCARDIOGRAM REPORT: CPT | Performed by: INTERNAL MEDICINE

## 2023-06-26 ENCOUNTER — TELEMEDICINE (OUTPATIENT)
Dept: BEHAVIORAL/MENTAL HEALTH CLINIC | Facility: CLINIC | Age: 23
End: 2023-06-26
Payer: COMMERCIAL

## 2023-06-26 DIAGNOSIS — F41.1 GENERALIZED ANXIETY DISORDER: ICD-10-CM

## 2023-06-26 DIAGNOSIS — F32.A DEPRESSION, UNSPECIFIED DEPRESSION TYPE: Primary | ICD-10-CM

## 2023-06-26 PROCEDURE — 90834 PSYTX W PT 45 MINUTES: CPT | Performed by: COUNSELOR

## 2023-06-26 RX ORDER — HYDROXYZINE HYDROCHLORIDE 25 MG/1
25 TABLET, FILM COATED ORAL EVERY 6 HOURS PRN
Qty: 90 TABLET | Refills: 0 | Status: SHIPPED | OUTPATIENT
Start: 2023-06-26
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
## 2023-07-03 ENCOUNTER — TELEMEDICINE (OUTPATIENT)
Dept: BEHAVIORAL/MENTAL HEALTH CLINIC | Facility: CLINIC | Age: 23
End: 2023-07-03
Payer: COMMERCIAL

## 2023-07-03 DIAGNOSIS — F41.1 GENERALIZED ANXIETY DISORDER: Primary | ICD-10-CM

## 2023-07-03 DIAGNOSIS — F32.A DEPRESSION, UNSPECIFIED DEPRESSION TYPE: ICD-10-CM

## 2023-07-03 PROCEDURE — 90832 PSYTX W PT 30 MINUTES: CPT | Performed by: COUNSELOR

## 2023-07-03 NOTE — PSYCH
Virtual Regular Visit    Verification of patient location:    Patient is located at Home in the following state in which I hold an active license [place]      Assessment/Plan:    Problem List Items Addressed This Visit        Other    Generalized anxiety disorder - Primary    Depression       Goals addressed in session: Goal 1 and Goal 2          Reason for visit is No chief complaint on file. Encounter provider Caty Brantley    Provider located at [address] [place] [id number] [phone]      Recent Visits  Date Type Provider Dept   06/26/23 [address]   Showing recent visits within past 7 days and meeting all other requirements  Today's Visits  Date Type Provider Dept   07/03/23 Telemedicine [address]   Showing today's visits and meeting all other requirements  Future Appointments  No visits were found meeting these conditions. Showing future appointments within next 150 days and meeting all other requirements       The patient was identified by name and date of birth. Vision Greg Cee was informed that this is a telemedicine visit and [facility] She agrees to proceed. .  My office door was closed. No one else was in the room. She acknowledged consent and understanding of privacy and security of the video platform. The patient has agreed to participate and understands they can discontinue the visit at any time. Patient is aware this is a billable service.      Subjective  Vision Flory Leiva is a 26 y.o. 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 - has EKG done every 2 years   • Severe menstrual cramps    • Visual impairment    • Yeast infection        Past Surgical History:   Procedure Laterality Date   • NO PAST SURGERIES         Current Outpatient Medications   Medication Sig Dispense Refill   • clobetasol (TEMOVATE) 0.05 % ointment Apply topically 2 (two) times a day (Patient not taking: Reported on 2/3/2023) 30 g 0   • cyclobenzaprine (FLEXERIL) 10 mg tablet Take 0.5 tablets (5 mg total) by mouth 3 (three) times a day as needed for muscle spasms (Patient not taking: Reported on 9/29/2022) 20 tablet 0   • diphenhydrAMINE (BENADRYL) 25 mg tablet Take 2 tablets (50 mg total) by mouth every 6 (six) hours (Patient not taking: Reported on 5/1/2023) 20 tablet 0   • drospirenone-ethinyl estradiol (RAFIA) 3-0.02 MG per tablet Take 1 tablet by mouth daily (Patient not taking: Reported on 6/22/2023) 28 tablet 3   • etonogestrel (NEXPLANON) subdermal implant Inject 68 mg under the skin (Patient not taking: Reported on 5/1/2023)     • hydrOXYzine HCL (ATARAX) 25 mg tablet Take 1 tablet (25 mg total) by mouth every 6 (six) hours as needed for anxiety for up to 90 doses 90 tablet 0   • ibuprofen (MOTRIN) 600 mg tablet Take 1 tablet (600 mg total) by mouth every 8 (eight) hours as needed for moderate pain (Patient not taking: Reported on 11/8/2022) 30 tablet 0   • lidocaine (LIDODERM) 5 % Apply 1 patch topically every 24 hours for 7 days Remove & Discard patch within 12 hours or as directed by MD 7 patch 0   • loratadine (CLARITIN) 10 mg tablet Take 1 tablet (10 mg total) by mouth daily 30 tablet 3   • methocarbamol (ROBAXIN) 500 mg tablet Take 1 tablet (500 mg total) by mouth 2 (two) times a day for 7 days (Patient not taking: Reported on 11/8/2022) 14 tablet 0   • phenazopyridine (PYRIDIUM) 95 MG tablet Take 1 tablet (95 mg total) by mouth 3 (three) times a day as needed for bladder spasms 10 tablet 0   • propranolol (INDERAL) 10 mg tablet Take 1 tablet (10 mg total) by mouth 2 (two) times a day as needed (Take 1 tablet up to twice per day 30 minutes before presentation as needed) for up to 60 doses 30 tablet 1   • sertraline (Zoloft) 50 mg tablet Take 1 tablet (50 mg total) by mouth daily 30 tablet 1     No current facility-administered medications for this visit. No Known Allergies    Review of Systems    Video Exam    There were no vitals filed for this visit. Physical Exam     Behavioral Health Psychotherapy Progress Note    Psychotherapy Provided: Individual Psychotherapy     1. Generalized anxiety disorder        2. Depression, unspecified depression type            Goals addressed in session: Goal 1 and Goal 2     DATA: Clinician met with Vision via telehealth for individual therapy. Vision processed working through recent break up. She reports utilizing social support of work and school friends and taking things day by day. Clinician encouraged continued use of social support and distraction. She reports taking medication as prescribed and is hopeful that they will be helpful in managing her symptoms. During this session, this clinician used the following therapeutic modalities: Engagement Strategies, Client-centered Therapy, Solution-Focused Therapy and Supportive Psychotherapy    Substance Abuse was not addressed during this session. If the client is diagnosed with a co-occurring substance use disorder, please indicate any changes in the frequency or amount of use: n/a. Stage of change for addressing substance use diagnoses: No substance use/Not applicable    ASSESSMENT:  Jules Brantley presents with a Anxious and Depressed mood. her affect is Normal range and intensity, which is congruent, with her mood and the content of the session. The client has made progress on their goals. Vision was open and engaged in the session. Jules Brantley presents with a none risk of suicide, none risk of self-harm, and none risk of harm to others.     For any risk assessment that surpasses a "low" rating, a safety plan must be developed. A safety plan was indicated: no  If yes, describe in detail n/a    PLAN: Between sessions, Jules Mejiajusto Levine will work on increasing coping skills and utilize social support. At the next session, the therapist will use Client-centered Therapy, Solution-Focused Therapy and Supportive Psychotherapy to address anxiety and depression. Behavioral Health Treatment Plan and Discharge Planning: Kang Marcano is aware of and agrees to continue to work on their treatment plan. They have identified and are working toward their discharge goals.  yes    Visit start and stop times:    07/03/23  Start Time: 1605  Stop Time: 1640  Total Visit Time: 35 minutes

## 2023-07-06 ENCOUNTER — TELEPHONE (OUTPATIENT)
Dept: INTERNAL MEDICINE CLINIC | Facility: CLINIC | Age: 23
End: 2023-07-06

## 2023-07-24 ENCOUNTER — TELEPHONE (OUTPATIENT)
Dept: PSYCHIATRY | Facility: CLINIC | Age: 23
End: 2023-07-24

## 2023-07-24 NOTE — TELEPHONE ENCOUNTER
NO-SHOW LETTER MAILED TO [facility] Greg Sykes.   ADDRESS: [address] [id number]

## 2023-07-25 ENCOUNTER — TELEPHONE (OUTPATIENT)
Dept: PSYCHIATRY | Facility: CLINIC | Age: 23
End: 2023-07-25

## 2023-07-25 ENCOUNTER — OFFICE VISIT (OUTPATIENT)
Dept: PSYCHIATRY | Facility: CLINIC | Age: 23
End: 2023-07-25
Payer: COMMERCIAL

## 2023-07-25 VITALS — DIASTOLIC BLOOD PRESSURE: 86 MMHG | SYSTOLIC BLOOD PRESSURE: 126 MMHG | HEART RATE: 83 BPM

## 2023-07-25 DIAGNOSIS — F41.9 ANXIETY: ICD-10-CM

## 2023-07-25 DIAGNOSIS — F41.1 GAD (GENERALIZED ANXIETY DISORDER): ICD-10-CM

## 2023-07-25 PROCEDURE — 99214 OFFICE O/P EST MOD 30 MIN: CPT | Performed by: STUDENT IN AN ORGANIZED HEALTH CARE EDUCATION/TRAINING PROGRAM

## 2023-07-25 RX ORDER — SERTRALINE HYDROCHLORIDE 100 MG/1
100 TABLET, FILM COATED ORAL DAILY
Qty: 30 TABLET | Refills: 0 | Status: SHIPPED | OUTPATIENT
Start: 2023-07-25 | End: 2023-08-24

## 2023-07-25 RX ORDER — PROPRANOLOL HYDROCHLORIDE 10 MG/1
10 TABLET ORAL 3 TIMES DAILY PRN
Qty: 90 TABLET | Refills: 0 | Status: SHIPPED | OUTPATIENT
Start: 2023-07-25

## 2023-07-25 RX ORDER — HYDROXYZINE HYDROCHLORIDE 25 MG/1
25 TABLET, FILM COATED ORAL EVERY 6 HOURS PRN
Qty: 120 TABLET | Refills: 0 | Status: SHIPPED | OUTPATIENT
Start: 2023-07-25

## 2023-07-25 NOTE — LETTER
July 25, 2023     Patient: Jules Torres  YOB: 2000  Date of Visit: 7/25/2023      To Whom it May Concern:    Jules Sykes is under my professional care. Vision was seen in my office on 7/25/2023. Vision may return to school on 9/4/2023 . She is completely capable of attending clinicals and providing safe and competent care. If you have any questions or concerns, please don't hesitate to call.          Sincerely,          Ken Saab MD        CC: No Recipients
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
## 2023-08-17 ENCOUNTER — TELEPHONE (OUTPATIENT)
Dept: PSYCHIATRY | Facility: CLINIC | Age: 23
End: 2023-08-17

## 2023-08-21 ENCOUNTER — TELEMEDICINE (OUTPATIENT)
Dept: BEHAVIORAL/MENTAL HEALTH CLINIC | Facility: CLINIC | Age: 23
End: 2023-08-21

## 2023-08-21 DIAGNOSIS — F32.A DEPRESSION, UNSPECIFIED DEPRESSION TYPE: Primary | ICD-10-CM

## 2023-08-22 NOTE — PSYCH
No Call. No Show. No Charge    Jules Nelda Hood no showed 08/21/23 appointment , staff called and left message to reschedule appointment     Treatment Plan not due at this session.

## 2023-09-07 ENCOUNTER — OFFICE VISIT (OUTPATIENT)
Dept: URGENT CARE | Age: 23
End: 2023-09-07
Payer: MEDICARE

## 2023-09-07 VITALS
HEART RATE: 110 BPM | DIASTOLIC BLOOD PRESSURE: 70 MMHG | SYSTOLIC BLOOD PRESSURE: 117 MMHG | RESPIRATION RATE: 18 BRPM | OXYGEN SATURATION: 98 % | TEMPERATURE: 100.3 F

## 2023-09-07 DIAGNOSIS — F41.9 ANXIETY: ICD-10-CM

## 2023-09-07 DIAGNOSIS — F41.1 GAD (GENERALIZED ANXIETY DISORDER): ICD-10-CM

## 2023-09-07 DIAGNOSIS — U07.1 COVID-19: ICD-10-CM

## 2023-09-07 DIAGNOSIS — R50.9 FEVER, UNSPECIFIED FEVER CAUSE: Primary | ICD-10-CM

## 2023-09-07 PROCEDURE — 99213 OFFICE O/P EST LOW 20 MIN: CPT | Performed by: NURSE PRACTITIONER

## 2023-09-07 NOTE — LETTER
September 7, 2023     Patient: Jules Adams  YOB: 2000  Date of Visit: 9/7/2023      To Whom it May Concern:    Jules Sykes is under my professional care. Vision was seen in my office on 9/7/2023. Please excuse patient from work today and tomorrow.           Sincerely,          JAMIE Deras        CC: No Recipients

## 2023-09-07 NOTE — TELEPHONE ENCOUNTER
Medication Refill Request     Name sertraline (ZOLOFT) 100 mg tablet    Dose/Frequency Take 1 tablet (100 mg total) by mouth daily   Quantity 30 tablet   Verified pharmacy   [x]  Verified ordering Provider   [x]  Does patient have enough for the next 3 days?  Yes [x] No []

## 2023-09-07 NOTE — PROGRESS NOTES
NAME: Paul Alvarenga is a 26 y.o. female  : 2000    MRN: [id number]    /70   Pulse (!) 110   Temp 100.3 °F (37.9 °C) (Tympanic)   Resp 18   SpO2 98%     2:46 PM    Assessment and Plan   Fever, unspecified fever cause [R50.9]  1. Fever, unspecified fever cause        2. COVID-19            Vision was seen today for cough. Diagnoses and all orders for this visit:    Fever, unspecified fever cause    COVID-19        Patient Instructions   Patient Instructions   Take zyrtec or allegra daily  Use flonase 1-2 sprays in each nare daily   Use nasal saline to the nose,   Use humidifer in room  Symptoms worsen go to ER  Rest    Covid test today in the office was positive,   Work note given  Aware to use mask  Quarnatine and continue to follow CDC guidelines. Proceed to the nearest ER if symptoms worsen, Follow up with your PCP  Continue to social distance, wash your hands, and wear your masks. Please continue to follow the CDC. gov guidelines daily for they are subject to change on COVID-19    Chief Complaint     Chief Complaint   Patient presents with   • Cough     Cough, congestion, thick green mucous. Sx for 3 days. Taking nyquil. Tmax 101.4 last night. History of Present Illness     Patient is a 27-year-old female who is here today for cough and congestion along with a cough with thick green mucus production. Symptoms present for the past 3 days is taking NyQuil over-the-counter had a fever last night 101.4. Today she has an elevated temp of 101.0 on exam.  Patient has not taken any Tylenol or Motrin today for symptoms. She has used NyQuil. Patient is aware that she has had a fever works for [employer] for [employer] however she does not wear mask while working and has not checked itself prior to arrival for [facility]. Patient denies having a headache however does have a sore throat. Patient has a slight cough and has not taken anything for her other symptoms.   Denies any nausea vomiting diarrhea denies chest pain shortness of breath at this time. Review of Systems   Review of Systems   Constitutional: Positive for fever. Negative for fatigue. HENT: Positive for congestion, postnasal drip, sinus pressure and sore throat. Negative for ear pain, rhinorrhea and sinus pain. Eyes: Negative. Respiratory: Positive for cough. Negative for shortness of breath. Cardiovascular: Negative. Gastrointestinal: Negative. Genitourinary: Negative. Musculoskeletal: Negative. Negative for myalgias. Skin: Negative. Neurological: Negative.           Current Medications       Current Outpatient Medications:   •  hydrOXYzine HCL (ATARAX) 25 mg tablet, Take 1 tablet (25 mg total) by mouth every 6 (six) hours as needed for anxiety for up to 120 doses, Disp: 120 tablet, Rfl: 0  •  propranolol (INDERAL) 10 mg tablet, Take 1 tablet (10 mg total) by mouth 3 (three) times a day as needed (Take 1 tablet up to twice per day 30 minutes before presentation as needed) for up to 90 doses, Disp: 90 tablet, Rfl: 0  •  sertraline (ZOLOFT) 100 mg tablet, Take 1 tablet (100 mg total) by mouth daily, Disp: 30 tablet, Rfl: 0  •  clobetasol (TEMOVATE) 0.05 % ointment, Apply topically 2 (two) times a day (Patient not taking: Reported on 2/3/2023), Disp: 30 g, Rfl: 0  •  cyclobenzaprine (FLEXERIL) 10 mg tablet, Take 0.5 tablets (5 mg total) by mouth 3 (three) times a day as needed for muscle spasms (Patient not taking: Reported on 9/29/2022), Disp: 20 tablet, Rfl: 0  •  diphenhydrAMINE (BENADRYL) 25 mg tablet, Take 2 tablets (50 mg total) by mouth every 6 (six) hours (Patient not taking: Reported on 5/1/2023), Disp: 20 tablet, Rfl: 0  •  drospirenone-ethinyl estradiol (RAFIA) 3-0.02 MG per tablet, Take 1 tablet by mouth daily (Patient not taking: Reported on 6/22/2023), Disp: 28 tablet, Rfl: 3  •  etonogestrel (NEXPLANON) subdermal implant, Inject 68 mg under the skin (Patient not taking: Reported on 5/1/2023), Disp: , Rfl:   •  ibuprofen (MOTRIN) 600 mg tablet, Take 1 tablet (600 mg total) by mouth every 8 (eight) hours as needed for moderate pain (Patient not taking: Reported on 11/8/2022), Disp: 30 tablet, Rfl: 0  •  lidocaine (LIDODERM) 5 %, Apply 1 patch topically every 24 hours for 7 days Remove & Discard patch within 12 hours or as directed by MD, Disp: 7 patch, Rfl: 0  •  loratadine (CLARITIN) 10 mg tablet, Take 1 tablet (10 mg total) by mouth daily (Patient not taking: Reported on 7/25/2023), Disp: 30 tablet, Rfl: 3  •  methocarbamol (ROBAXIN) 500 mg tablet, Take 1 tablet (500 mg total) by mouth 2 (two) times a day for 7 days (Patient not taking: Reported on 11/8/2022), Disp: 14 tablet, Rfl: 0  •  phenazopyridine (PYRIDIUM) 95 MG tablet, Take 1 tablet (95 mg total) by mouth 3 (three) times a day as needed for bladder spasms (Patient not taking: Reported on 7/25/2023), Disp: 10 tablet, Rfl: 0    Current Allergies     Allergies as of 09/07/2023   • (No Known Allergies)              Past Medical History:   Diagnosis Date   • Anxiety    • Depression 02/01/2022   • FTND (full term normal delivery) 2000   • Herniated disc, cervical     9/2022   • 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 - has EKG done every 2 years   • Severe menstrual cramps    • Visual impairment    • Yeast infection        Past Surgical History:   Procedure Laterality Date   • NO PAST SURGERIES         Family History   Problem Relation Age of Onset   • No Known Problems Mother    • No Known Problems Father    • No Known Problems Sister    • No Known Problems Brother    • No Known Problems Sister    • No Known Problems Sister    • No Known Problems Brother    • No Known Problems Brother    • Cancer Family         colon   • Breast cancer Paternal Grandmother    • Diabetes Paternal Grandmother    • Hypertension Maternal Grandmother          Medications have been verified.     The following portions of the patient's history were reviewed and updated as appropriate: allergies, current medications, past family history, past medical history, past social history, past surgical history and problem list.    Objective   /70   Pulse (!) 110   Temp 100.3 °F (37.9 °C) (Tympanic)   Resp 18   SpO2 98%      Physical Exam     Physical Exam  Constitutional:       General: She is awake. She is not in acute distress. Appearance: Normal appearance. She is well-developed. She is not ill-appearing. HENT:      Head: Normocephalic. Right Ear: Hearing, tympanic membrane, ear canal and external ear normal. There is no impacted cerumen. Left Ear: Hearing, tympanic membrane, ear canal and external ear normal. There is no impacted cerumen. Nose: No mucosal edema, congestion or rhinorrhea. Right Turbinates: Swollen and pale. Left Turbinates: Swollen and pale. Right Sinus: No maxillary sinus tenderness. Left Sinus: No maxillary sinus tenderness. Mouth/Throat:      Lips: Pink. Mouth: Mucous membranes are moist.      Pharynx: Oropharynx is clear. Uvula midline. No pharyngeal swelling, oropharyngeal exudate, posterior oropharyngeal erythema or uvula swelling. Tonsils: No tonsillar exudate or tonsillar abscesses. Comments: Clear drainage in back of throat  Eyes:      General: Lids are normal.      Extraocular Movements: Extraocular movements intact. Pupils: Pupils are equal, round, and reactive to light. Cardiovascular:      Rate and Rhythm: Normal rate and regular rhythm. Heart sounds: Normal heart sounds. Pulmonary:      Effort: Pulmonary effort is normal.      Breath sounds: Normal breath sounds and air entry. No decreased breath sounds, wheezing, rhonchi or rales. Skin:     General: Skin is warm. Capillary Refill: Capillary refill takes less than 2 seconds. Neurological:      General: No focal deficit present.       Mental Status: She is alert and oriented to person, place, and time.   Psychiatric:         Attention and Perception: Attention normal.         Mood and Affect: Mood normal.         Behavior: Behavior is cooperative. Note: Portions of this record may have been created with voice recognition software. Occasional wrong word or "sound a like" substitutions may have occurred due to the inherent limitations of voice recognition software. Please read the chart carefully and recognize, using context, where substitutions have occurred. JAMIE Hines

## 2023-09-07 NOTE — PATIENT INSTRUCTIONS
Take zyrtec or allegra daily  Use flonase 1-2 sprays in each nare daily   Use nasal saline to the nose,   Use humidifer in room  Symptoms worsen go to ER  Rest    Covid test today in the office was positive,   Work note given  Aware to use mask  Quarnatine and continue to follow CDC guidelines.

## 2023-09-07 NOTE — LETTER
September 7, 2023     Patient: Jules Beltran  YOB: 2000  Date of Visit: 9/7/2023      To Whom it May Concern:    Jules Sykes is under my professional care. Vision was seen in my office on 9/7/2023. Please excuse patient from work/school/activities today and tomorrow for she is positive with COVID 19 in the office with a fever. She may return 09/09/2023 as long as she is fever free for 24 hours without use of tylenol and motrin and symptoms improve, upon returning to work/school/ activities, must wear a mask for the following 5 days. Encouraged to wear at all times.            Sincerely,          JAMIE Simms        CC: No Recipients

## 2023-09-08 RX ORDER — HYDROXYZINE HYDROCHLORIDE 25 MG/1
25 TABLET, FILM COATED ORAL EVERY 6 HOURS PRN
Qty: 120 TABLET | Refills: 0 | Status: SHIPPED | OUTPATIENT
Start: 2023-09-08 | End: 2023-09-12 | Stop reason: SDUPTHER

## 2023-09-08 RX ORDER — SERTRALINE HYDROCHLORIDE 100 MG/1
100 TABLET, FILM COATED ORAL DAILY
Qty: 30 TABLET | Refills: 0 | Status: SHIPPED | OUTPATIENT
Start: 2023-09-08 | End: 2023-09-12 | Stop reason: SDUPTHER

## 2023-09-12 ENCOUNTER — TELEMEDICINE (OUTPATIENT)
Dept: PSYCHIATRY | Facility: CLINIC | Age: 23
End: 2023-09-12
Payer: COMMERCIAL

## 2023-09-12 DIAGNOSIS — F41.1 GAD (GENERALIZED ANXIETY DISORDER): ICD-10-CM

## 2023-09-12 DIAGNOSIS — F41.9 ANXIETY: ICD-10-CM

## 2023-09-12 DIAGNOSIS — F33.1 MDD (MAJOR DEPRESSIVE DISORDER), RECURRENT EPISODE, MODERATE (HCC): Primary | ICD-10-CM

## 2023-09-12 PROCEDURE — 99214 OFFICE O/P EST MOD 30 MIN: CPT | Performed by: STUDENT IN AN ORGANIZED HEALTH CARE EDUCATION/TRAINING PROGRAM

## 2023-09-12 RX ORDER — SERTRALINE HYDROCHLORIDE 100 MG/1
100 TABLET, FILM COATED ORAL DAILY
Qty: 60 TABLET | Refills: 0 | Status: SHIPPED | OUTPATIENT
Start: 2023-09-12 | End: 2023-11-11

## 2023-09-12 RX ORDER — PROPRANOLOL HYDROCHLORIDE 10 MG/1
10 TABLET ORAL 3 TIMES DAILY PRN
Qty: 120 TABLET | Refills: 0 | Status: SHIPPED | OUTPATIENT
Start: 2023-09-12 | End: 2023-11-11

## 2023-09-12 RX ORDER — BUPROPION HYDROCHLORIDE 150 MG/1
150 TABLET ORAL
Qty: 60 TABLET | Refills: 0 | Status: SHIPPED | OUTPATIENT
Start: 2023-09-12 | End: 2023-11-11

## 2023-09-12 RX ORDER — HYDROXYZINE HYDROCHLORIDE 25 MG/1
25 TABLET, FILM COATED ORAL EVERY 6 HOURS PRN
Qty: 120 TABLET | Refills: 0 | Status: SHIPPED | OUTPATIENT
Start: 2023-09-12

## 2023-09-12 RX ORDER — PROPRANOLOL HYDROCHLORIDE 10 MG/1
10 TABLET ORAL 3 TIMES DAILY PRN
Qty: 90 TABLET | Refills: 0 | OUTPATIENT
Start: 2023-09-12
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
## 2023-09-14 ENCOUNTER — TELEPHONE (OUTPATIENT)
Dept: PSYCHIATRY | Facility: CLINIC | Age: 23
End: 2023-09-14

## 2023-09-14 NOTE — TELEPHONE ENCOUNTER
Patient has been scheduled for 9/19 at 2 pm virtually. Patient will schedule more follow ups during appointment time with provider.

## 2023-09-21 ENCOUNTER — TELEPHONE (OUTPATIENT)
Dept: PSYCHIATRY | Facility: CLINIC | Age: 23
End: 2023-09-21

## 2023-10-16 ENCOUNTER — TELEPHONE (OUTPATIENT)
Dept: NEUROSURGERY | Facility: CLINIC | Age: 23
End: 2023-10-16

## 2023-10-16 NOTE — TELEPHONE ENCOUNTER
PATIENT CALLED D/T REFERRAL. REFERRAL IS FROM LAST YEAR FOR BILATERAL LBP NOW PAIN IS LEFT SIDE CONSTANT GOES PELVIS AND RADIATES DOWN LEFT LEG. NO RECENT IMAGING NOR HAS BEEN SEEN BY PCP.  EXPLAINED THE INTAKE

## 2023-10-16 NOTE — TELEPHONE ENCOUNTER
10/16/23 PT LAST SEEN AS [facility] 11/2022 FOR LSPINE, PRN. NO RECENT IMAGING. CALLED PT AND SCHEDULED OVL WITH AP 10/17/23 @ 2PM IN [facility]. SHE HAS ADDRESS. PT HAS [address]

## 2023-10-17 ENCOUNTER — HOSPITAL ENCOUNTER (OUTPATIENT)
Dept: RADIOLOGY | Facility: HOSPITAL | Age: 23
Discharge: HOME | End: 2023-10-17
Payer: MEDICARE

## 2023-10-17 ENCOUNTER — OFFICE VISIT (OUTPATIENT)
Dept: NEUROSURGERY | Facility: CLINIC | Age: 23
End: 2023-10-17

## 2023-10-17 VITALS
DIASTOLIC BLOOD PRESSURE: 86 MMHG | RESPIRATION RATE: 17 BRPM | BODY MASS INDEX: 36.82 KG/M2 | SYSTOLIC BLOOD PRESSURE: 138 MMHG | WEIGHT: 195 LBS | HEART RATE: 74 BPM | TEMPERATURE: 98.2 F | HEIGHT: 61 IN | OXYGEN SATURATION: 99 %

## 2023-10-17 DIAGNOSIS — M48.061 SPINAL STENOSIS OF LUMBAR REGION WITHOUT NEUROGENIC CLAUDICATION: ICD-10-CM

## 2023-10-17 DIAGNOSIS — M46.1 SACROILIITIS, NOT ELSEWHERE CLASSIFIED (HCC): ICD-10-CM

## 2023-10-17 DIAGNOSIS — M54.17 LUMBOSACRAL RADICULOPATHY: ICD-10-CM

## 2023-10-17 DIAGNOSIS — M47.816 FACET HYPERTROPHY OF LUMBAR REGION: ICD-10-CM

## 2023-10-17 DIAGNOSIS — M51.26 HERNIATED LUMBAR INTERVERTEBRAL DISC: Primary | ICD-10-CM

## 2023-10-17 DIAGNOSIS — M51.26 HERNIATED LUMBAR INTERVERTEBRAL DISC: ICD-10-CM

## 2023-10-17 PROCEDURE — 72110 X-RAY EXAM L-2 SPINE 4/>VWS: CPT

## 2023-10-17 NOTE — ASSESSMENT & PLAN NOTE
As addressed in HPI   Concern for progression or recurrent lumbar disc herniation L5 S1 ,, positive straight leg raise for left sided low back pain , coughed in the office x 2 was in significant pain   Positive compression and FRANCOIS for bilateral SI joint discomfort/pain   Denies urinary retention  but admits when pain worsens has to urinary urgency and incontinence. When has sensation to urinated must immediately go or is incontinent. Denies bowel incontinence , no saddle anesthesia or weakness in extremities   antalgic gait when immediately stand , avoids FWB left leg     Imagining   MRI lumbar L4-L5:  Mild facet hypertrophy without central or foraminal narrowing. L5-S1:  Moderate-sized broad-based central protrusion type disc herniation contacts and flattens the ventral thecal sac. There is mild central stenosis. Foramina are patent. Plan  Reviewed MRI Lumbar spine 2022   Ordered MRI lumbar spine wo  Ordered Xray lumbar 4 V --complete over next 2-3 days   Ordered Pain management SIJ, sacroiliitis, initiate multimodal treatment . Possible herniated disc   Reinforce HEP for lumbar and lumbar paraspinal muscle strengthening. RTO as snpx after MRI completion    Reviewed Red flag symptoms if develop go to closest Ed for assessment   Recommend activity restrictions -no lifting greater than 10 lbs no bending, pushing, pulling, stretching, will be revaluated at nex visit after MRI lumbar wo. Advised if she has additional questions or concerns call the office.

## 2023-10-17 NOTE — PROGRESS NOTES
Assessment/Plan:    Herniated lumbar intervertebral disc  As addressed in HPI   Concern for progression or recurrent lumbar disc herniation L5 S1 ,, positive straight leg raise for left sided low back pain , coughed in the office x 2 was in significant pain   Positive compression and FRANCOIS for bilateral SI joint discomfort/pain   Denies urinary retention  but admits when pain worsens has to urinary urgency and incontinence. When has sensation to urinated must immediately go or is incontinent. Denies bowel incontinence , no saddle anesthesia or weakness in extremities   antalgic gait when immediately stand , avoids FWB left leg     Imagining   MRI lumbar L4-L5:  Mild facet hypertrophy without central or foraminal narrowing. L5-S1:  Moderate-sized broad-based central protrusion type disc herniation contacts and flattens the ventral thecal sac. There is mild central stenosis. Foramina are patent. Plan  Reviewed MRI Lumbar spine 2022   Ordered MRI lumbar spine wo  Ordered Xray lumbar 4 V --complete over next 2-3 days   Ordered Pain management SIJ, sacroiliitis, initiate multimodal treatment . Possible herniated disc   Reinforce HEP for lumbar and lumbar paraspinal muscle strengthening. RTO as snpx after MRI completion    Reviewed Red flag symptoms if develop go to closest Ed for assessment   Recommend activity restrictions -no lifting greater than 10 lbs no bending, pushing, pulling, stretching, will be revaluated at nex visit after MRI lumbar wo. Advised if she has additional questions or concerns call the office. Subjective: RTO as f/u recurrent back pain  is constant  into the left leg. Patient ID: Daly Mora is a 26 y.o. female     HPI   She has a long standing history of lumbar radiculopathy with lumbar herniated disc  2022 MRI  lumbar .   She has a previous neurosurgery visits:yes last visit 11/8/2023 herniated lumbar disc , continue conservative management She reports symptoms of left sided low back into left buttock left hip pelvis rim area posterior thigh to left knee, intermittent episodes of pain in the anterior left thigh. Intermittent right sided hip and pelvic rim pain. Left leg weakness feel like it is going to give out. Reports has has difficultly lifting left leg. Se reports symptoms onset as 2019 with inciting event  during a vacation in [place] did a lot of walking, hiking in rough terrain, riding rough water waves , was very physically active , physically challenged herself. Woke one morning had excruciating low back pain , and could not get OOB could not lift feet off bed, had pain down both legs, numbness and tingling in legs down to toes with associated pins and needles. Reports symptoms  resolved in one week . Treatment complete bed rest for one week the remainder of vacation. Was able to fly home without incident. Since onset has had  repeated episodes of acute exacerbations. In 2020  has exacerbation with sciatica symptoms from low back into both legs into feet but not as bad as 2019. Another exacerbation in  2022 but symptoms worse than 2020 by not as severe a  initital in 2019. 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 She reports the quality of symptoms sharp and  constant. . She reports the severity of symptoms as  10/10, "this week has been very bad". Reports she has work long hours was moving also  lifting boxes  and  a lot of physical activity. She reports aggravating symptoms as physical activity lifting, bending, twisting, and sitting for prolonged, periods. She reports over the past week nothing is relieving her symptoms. She denies previous spinal surgery. Multimodal Treatments[de-identified]  PT ---2021 - 2022 Baylee Avila . Received HEP which she continues to perform at the gym and home, lumbar core and back /paraspinal muscles .  She named the exercises she is performing    PM --has never attended. Lizz Donohue in [place] for spine adjustment ,pelvis anterior tilt. Efficacy was temporary relief for 2 days    Accupuncture -non is considering  Medicinal --ibuprofen  for pain . 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 Is hired to start in [employer] in January 2024. REVIEW OF SYSTEMS  Review of Systems   Constitutional:  Positive for activity change and fatigue. HENT: Negative. Eyes: Negative. Respiratory: Negative. Cardiovascular: Negative. Gastrointestinal:  Positive for nausea. Endocrine: Negative. Genitourinary:  Positive for urgency (urine accidents). Musculoskeletal:  Positive for back pain (lower back pain radiates across hips/pelvis; down thigh (front/back)), gait problem (left side weak) and myalgias (tightness and pressure in her lower back almost an everyday thing ). Back pain for years but worse within this past year     PT on and off since last year   No injection for her back   Was seeing a chiropractor , last appt was 08/2022 with relief        Allergic/Immunologic: Negative. Neurological:  Positive for dizziness (from sitting/laying to standing), weakness (left side, feels like leg gives out) and light-headedness (with dizziness). Tremors: sometimes maybe from being anxious . Headaches: -subsided- bad recently , maybe stress related or withdrawl from coming off of birth control. Hematological: Negative.           Meds/Allergies     Current Outpatient Medications   Medication Sig Dispense Refill    buPROPion (Wellbutrin XL) 150 mg 24 hr tablet Take 1 tablet (150 mg total) by mouth daily after breakfast 60 tablet 0    hydrOXYzine HCL (ATARAX) 25 mg tablet Take 1 tablet (25 mg total) by mouth every 6 (six) hours as needed for anxiety for up to 120 doses 120 tablet 0    propranolol (INDERAL) 10 mg tablet Take 1 tablet (10 mg total) by mouth 3 (three) times a day as needed (Take 1 tablet up to twice per day 30 minutes before presentation as needed) 120 tablet 0    sertraline (ZOLOFT) 100 mg tablet Take 1 tablet (100 mg total) by mouth daily 60 tablet 0     No current facility-administered medications for this visit. 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 - has EKG done every 2 years    Severe menstrual cramps     Visual impairment     Yeast infection        Past Surgical History:   Procedure Laterality Date    NO PAST SURGERIES         Social History     Tobacco Use    Smoking status: Never    Smokeless tobacco: Never   Vaping Use    Vaping Use: Former    Substances: Nicotine   Substance Use Topics    Alcohol use:  Yes     Alcohol/week: 1.0 standard drink of alcohol     Types: 1 Standard drinks or equivalent per week     Comment: social    Drug use: Not Currently     Types: Marijuana     Comment: Occ       Family History   Problem Relation Age of Onset    No Known Problems Mother     No Known Problems Father     No Known Problems Sister     No Known Problems Brother     No Known Problems Sister     No Known Problems Sister     No Known Problems Brother     No Known Problems Brother     Cancer Family         colon    Breast cancer Paternal Grandmother     Diabetes Paternal Grandmother     Hypertension Maternal Grandmother        The following portions of the patient's history were reviewed and updated as appropriate: allergies, current medications, past family history, past medical history, past social history, past surgical history and problem list.      EXAM    Vitals:Blood pressure 138/86, pulse 74, temperature 98.2 °F (36.8 °C), temperature source Temporal, resp. rate 17, height 5' 1" (1.549 m), weight 88.5 kg (195 lb), SpO2 99 %, not currently breastfeeding. ,Body mass index is 36.84 kg/m². Physical Exam  Vitals and nursing note reviewed. Constitutional:       General: She is not in acute distress. Appearance: Normal appearance. She is not ill-appearing, toxic-appearing or diaphoretic. HENT:      Head: Normocephalic and atraumatic. Pulmonary:      Effort: Pulmonary effort is normal. No respiratory distress. Musculoskeletal:         General: Tenderness (bilateral SI joints) present. No signs of injury. Right lower leg: No edema. Left lower leg: No edema. Skin:     General: Skin is warm and dry. Neurological:      Mental Status: She is alert and oriented to person, place, and time. Sensory: No sensory deficit. Motor: Weakness present. Coordination: Coordination normal.      Gait: Gait abnormal.      Deep Tendon Reflexes: Reflexes normal.      Reflex Scores:       Achilles reflexes are 2+ on the right side and 2+ on the left side. Psychiatric:         Mood and Affect: Mood normal.         Behavior: Behavior normal.         Neurologic Exam     Mental Status   Oriented to person, place, and time.    Level of consciousness: alert    Motor Exam   Muscle bulk: normal  Overall muscle tone: normal    Strength   Right iliopsoas: 5/5  Left iliopsoas: 4/5  Right quadriceps: 5/5  Left quadriceps: 4/5  Right hamstrin/5  Left hamstrin/5  Right glutei: 5/5  Left glutei: 4/5  Right anterior tibial: 5/5  Left anterior tibial: 5/5  Right gastroc: 5/5  Left gastroc: 5/5    Sensory Exam   Light touch normal.   Right leg light touch: normal  Left leg light touch: normal    Gait, Coordination, and Reflexes     Gait  Gait: (antalgic)    Reflexes   Right achilles: 2+  Left achilles: 2+  Right : 2+  Left : 2+  Right ankle clonus: absent  Left ankle clonus: absent      Imaging Studies    9/18/22 MRI LUMBAR SPINE WITH AND WITHOUT CONTRAST     INDICATION: M54.50: Low back pain, unspecified. COMPARISON:  None. TECHNIQUE:  Sagittal T1, sagittal T2, sagittal inversion recovery, axial T1 and axial T2, coronal T2. Sagittal and axial T1 postcontrast.     IV Contrast:  8 mL of Gadobutrol injection (SINGLE-DOSE)      IMAGE QUALITY:  Diagnostic     FINDINGS:     VERTEBRAL BODIES:  There are 5 lumbar type vertebral bodies. Normal alignment of the lumbar spine. No spondylolysis or spondylolisthesis. No scoliosis. No compression fracture. Normal marrow signal is identified within the visualized bony   structures. No discrete marrow lesion. SACRUM:  Normal signal within the sacrum. No evidence of insufficiency or stress fracture. DISTAL CORD AND CONUS:  Normal size and signal within the distal cord and conus. PARASPINAL SOFT TISSUES:  Paraspinal soft tissues are unremarkable. LOWER THORACIC DISC SPACES:  Normal disc height and signal.  No disc herniation, canal stenosis or foraminal narrowing. LUMBAR DISC SPACES:     L1-L2:  Normal.     L2-L3:  Normal.     L3-L4:  Normal.     L4-L5:  Mild facet hypertrophy without central or foraminal narrowing. L5-S1:  Moderate-sized broad-based central protrusion type disc herniation contacts and flattens the ventral thecal sac. There is mild central stenosis. Foramina are patent. POSTCONTRAST IMAGING:  No abnormal enhancement. IMPRESSION:     Moderate size central broad-based protrusion at L5-S1 results in mild central stenosis. I have personally reviewed pertinent reports.    and I have personally reviewed pertinent films in PACS    I have spent a total time of 45 minutes on 10/17/23 in caring for this patient including Diagnostic results, Risks and benefits of tx options, Instructions for management, Patient and family education, Importance of tx compliance, Risk factor reductions, Impressions, Counseling / Coordination of care, Documenting in the medical record, Reviewing / ordering tests, medicine, procedures  , Obtaining or reviewing history  , and Communicating with other healthcare professionals .

## 2023-10-19 ENCOUNTER — TELEPHONE (OUTPATIENT)
Dept: NEUROSURGERY | Facility: CLINIC | Age: 23
End: 2023-10-19

## 2023-10-19 NOTE — TELEPHONE ENCOUNTER
Patient called to have her excuse redone. The original did not have her name nor the providers name/signature. She provided fax to the [address] at [phone]. she asked if it can be put in my chart as well.

## 2023-10-30 ENCOUNTER — TELEPHONE (OUTPATIENT)
Dept: NEUROSURGERY | Facility: CLINIC | Age: 23
End: 2023-10-30

## 2023-10-30 NOTE — TELEPHONE ENCOUNTER
Patient requesting update to work note, pt requesting adjust her work note , pt statses she is ready to return to work

## 2023-11-03 ENCOUNTER — TELEPHONE (OUTPATIENT)
Dept: NEUROSURGERY | Facility: CLINIC | Age: 23
End: 2023-11-03

## 2023-11-03 NOTE — TELEPHONE ENCOUNTER
Reached out to [employer] as requested by JAMIE PRECIADO regarding return to work. Left a detailed message indicating return to work letter was placed in her chart and I can resend it if she wishes. Also mentioned need for MRI to be completed prior to her follow-up 11/9. Encouraged call back with questions or concerns.

## 2023-11-03 NOTE — TELEPHONE ENCOUNTER
Can a nurse please contact patient , I never told her she cannot work. I bradford continue activity restrictions  recommendations until she is reassessed in the office. If she chooses not to comply that is her decision. At visit   Recommend activity restrictions -no lifting greater than 10 lbs no bending, pushing, pulling, stretching, will be revaluated at nex visit after MRI lumbar wo. The letter does not say she cannot work.

## 2023-11-03 NOTE — TELEPHONE ENCOUNTER
Called patient and spoke to her today regarding need for MRI prior to appointment, she will call CS to reschedule her MRI and is aware she needs to return call to our office to reschedule her follow up appointment if she can not obtain her MRI prior to her appointment on 11/9/2023.

## 2023-11-06 NOTE — TELEPHONE ENCOUNTER
11/06/23 I called pt again see mri notes - she neds to have mri she cx reachedueld before 11/09 apt if not 11/09 apt will be reschedul;ed I called pt lm to call central scheduling left their number as well as our number - I spoke with pt last week see mri order notes she was aware to rescheule before 11/09 apt -bindu

## 2023-11-07 NOTE — TELEPHONE ENCOUNTER
Patient not scheduled for MRI yet, pushed patient's appointment out to 12/7/2023 with [facility] and Dr. Etienne Kenney. Called patient, LMOM, reminder needs to call CS and schedule MRI needed prior to follow up with our office, provided number to CS and new appointment details/info.

## 2023-11-13 ENCOUNTER — TELEPHONE (OUTPATIENT)
Dept: NEUROSURGERY | Facility: CLINIC | Age: 23
End: 2023-11-13

## 2023-11-13 NOTE — TELEPHONE ENCOUNTER
Returned [facility] and she had questions about MRI and pregnancy so I transferred her to nursing line

## 2023-11-13 NOTE — TELEPHONE ENCOUNTER
Received a call from patient requesting a call back regarding follow up plan and MRI. Returned call to patient who stated she is now pregnant and unable to have the MRI done. 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 Patient stated she has been feeling better over all and is willing to return full duty. She stated she still struggles with intermittent sciatica however no longer has constant back pain. Patient also reports she feels better when she is up and moving rather than sitting for extended periods of time. Advised this RN will route to [place] LINDSAYNP to confirm if she is in agreement with patient returning to work without restrictions at this time. Patient was appreciative of the call back.

## 2023-11-14 ENCOUNTER — TELEPHONE (OUTPATIENT)
Dept: PSYCHIATRY | Facility: CLINIC | Age: 23
End: 2023-11-14

## 2023-11-14 NOTE — TELEPHONE ENCOUNTER
Patient contacted the office and wished to speak with her provider regarding her now being pregnant. The patient stated that she was concerned since she was previously informed that her bupropion and zoloft should not be taken if pregnant. Please review. Thank you    The patient can be reached at [phone].

## 2023-11-15 ENCOUNTER — TELEPHONE (OUTPATIENT)
Dept: PSYCHIATRY | Facility: CLINIC | Age: 23
End: 2023-11-15

## 2023-11-17 NOTE — TELEPHONE ENCOUNTER
I never told patient she could not work. 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 Lorraine Edmonds can provide a letter that  as per patient she feels better and wishes to RTW with no restrictions .  She reported is unable to have an MRI is pregnant. , I provided her Maddi Turner

## 2023-12-02 ENCOUNTER — HOSPITAL ENCOUNTER (EMERGENCY)
Facility: HOSPITAL | Age: 23
Discharge: HOME | End: 2023-12-02
Attending: EMERGENCY MEDICINE | Admitting: EMERGENCY MEDICINE
Payer: MEDICARE

## 2023-12-02 VITALS
OXYGEN SATURATION: 100 % | SYSTOLIC BLOOD PRESSURE: 123 MMHG | DIASTOLIC BLOOD PRESSURE: 57 MMHG | TEMPERATURE: 98.4 F | RESPIRATION RATE: 18 BRPM | HEART RATE: 92 BPM

## 2023-12-02 DIAGNOSIS — Z34.90 INTRAUTERINE PREGNANCY: ICD-10-CM

## 2023-12-02 DIAGNOSIS — R42 LIGHTHEADEDNESS: Primary | ICD-10-CM

## 2023-12-02 LAB
ANION GAP SERPL CALCULATED.3IONS-SCNC: 8 MMOL/L
BASOPHILS # BLD AUTO: 0.05 THOUSANDS/ÂΜL (ref 0–0.1)
BASOPHILS NFR BLD AUTO: 1 % (ref 0–1)
BUN SERPL-MCNC: 15 MG/DL (ref 5–25)
CALCIUM SERPL-MCNC: 8.7 MG/DL (ref 8.4–10.2)
CHLORIDE SERPL-SCNC: 104 MMOL/L (ref 96–108)
CO2 SERPL-SCNC: 25 MMOL/L (ref 21–32)
CREAT SERPL-MCNC: 0.71 MG/DL (ref 0.6–1.3)
EOSINOPHIL # BLD AUTO: 0.2 THOUSAND/ÂΜL (ref 0–0.61)
EOSINOPHIL NFR BLD AUTO: 2 % (ref 0–6)
ERYTHROCYTE [DISTWIDTH] IN BLOOD BY AUTOMATED COUNT: 15.1 % (ref 11.6–15.1)
GFR SERPL CREATININE-BSD FRML MDRD: 121 ML/MIN/1.73SQ M
GLUCOSE SERPL-MCNC: 86 MG/DL (ref 65–140)
HCT VFR BLD AUTO: 36.2 % (ref 34.8–46.1)
HGB BLD-MCNC: 11.5 G/DL (ref 11.5–15.4)
IMM GRANULOCYTES # BLD AUTO: 0.04 THOUSAND/UL (ref 0–0.2)
IMM GRANULOCYTES NFR BLD AUTO: 0 % (ref 0–2)
LYMPHOCYTES # BLD AUTO: 2.69 THOUSANDS/ÂΜL (ref 0.6–4.47)
LYMPHOCYTES NFR BLD AUTO: 25 % (ref 14–44)
MCH RBC QN AUTO: 25.5 PG (ref 26.8–34.3)
MCHC RBC AUTO-ENTMCNC: 31.8 G/DL (ref 31.4–37.4)
MCV RBC AUTO: 80 FL (ref 82–98)
MONOCYTES # BLD AUTO: 0.72 THOUSAND/ÂΜL (ref 0.17–1.22)
MONOCYTES NFR BLD AUTO: 7 % (ref 4–12)
NEUTROPHILS # BLD AUTO: 6.93 THOUSANDS/ÂΜL (ref 1.85–7.62)
NEUTS SEG NFR BLD AUTO: 65 % (ref 43–75)
NRBC BLD AUTO-RTO: 0 /100 WBCS
PLATELET # BLD AUTO: 303 THOUSANDS/UL (ref 149–390)
PMV BLD AUTO: 10.4 FL (ref 8.9–12.7)
POTASSIUM SERPL-SCNC: 3.8 MMOL/L (ref 3.5–5.3)
RBC # BLD AUTO: 4.51 MILLION/UL (ref 3.81–5.12)
SODIUM SERPL-SCNC: 137 MMOL/L (ref 135–147)
WBC # BLD AUTO: 10.63 THOUSAND/UL (ref 4.31–10.16)

## 2023-12-02 PROCEDURE — 96360 HYDRATION IV INFUSION INIT: CPT

## 2023-12-02 PROCEDURE — 99285 EMERGENCY DEPT VISIT HI MDM: CPT | Performed by: EMERGENCY MEDICINE

## 2023-12-02 PROCEDURE — 36415 COLL VENOUS BLD VENIPUNCTURE: CPT

## 2023-12-02 PROCEDURE — 85025 COMPLETE CBC W/AUTO DIFF WBC: CPT

## 2023-12-02 PROCEDURE — 93005 ELECTROCARDIOGRAM TRACING: CPT

## 2023-12-02 PROCEDURE — 76815 OB US LIMITED FETUS(S): CPT | Performed by: EMERGENCY MEDICINE

## 2023-12-02 PROCEDURE — 80048 BASIC METABOLIC PNL TOTAL CA: CPT

## 2023-12-02 PROCEDURE — 99284 EMERGENCY DEPT VISIT MOD MDM: CPT

## 2023-12-02 RX ADMIN — SODIUM CHLORIDE 1000 ML: 0.9 INJECTION, SOLUTION INTRAVENOUS at 19:02

## 2023-12-03 LAB
ATRIAL RATE: 76 BPM
P AXIS: 46 DEGREES
PR INTERVAL: 140 MS
QRS AXIS: 89 DEGREES
QRSD INTERVAL: 78 MS
QT INTERVAL: 366 MS
QTC INTERVAL: 411 MS
T WAVE AXIS: 59 DEGREES
VENTRICULAR RATE: 76 BPM

## 2023-12-03 NOTE — DISCHARGE INSTRUCTIONS
Please stay hydrated. All testing normal today. Follow up with OB as planned on Monday for your initial ultrasound. Return to the ED with any new/worsening concerns.

## 2023-12-03 NOTE — ED PROVIDER NOTES
History  Chief Complaint   Patient presents with    Shortness of Breath     Pt started having dyspnea on exertion since 1300. Pt states she feels flushed and dizzy. Pt is 8 weeks pregnant     HPI    Patient is a 50-year-old female who is currently 8 weeks pregnant, with past medical history of anxiety, depression, presenting to the ED for evaluation of several hour history of lightheadedness with exertion, slight shortness of breath with exertion. Symptoms started while she was working earlier today. Thought that it was related to dehydration, patient has been drinking water and tolerating her diet without issue. There is no fevers, chills, cough or congestion, chest pain, pleuritic component, abdominal pain, nausea, vomiting, diarrhea, urinary complaints, vaginal bleeding or discharge. Patient has not had a formal ultrasound for pregnancy, this is scheduled for 2 days from now. Other than in pregnancy, patient denies any risk factor for PE. Prior to Admission Medications   Prescriptions Last Dose Informant Patient Reported? Taking?    buPROPion (Wellbutrin XL) 150 mg 24 hr tablet   No No   Sig: Take 1 tablet (150 mg total) by mouth daily after breakfast   hydrOXYzine HCL (ATARAX) 25 mg tablet   No No   Sig: Take 1 tablet (25 mg total) by mouth every 6 (six) hours as needed for anxiety for up to 120 doses   propranolol (INDERAL) 10 mg tablet   No No   Sig: Take 1 tablet (10 mg total) by mouth 3 (three) times a day as needed (Take 1 tablet up to twice per day 30 minutes before presentation as needed)   sertraline (ZOLOFT) 100 mg tablet   No No   Sig: Take 1 tablet (100 mg total) by mouth daily      Facility-Administered Medications: None       Past Medical History:   Diagnosis Date    Anxiety     Depression 02/01/2022    FTND (full term normal delivery) 2001    Herniated disc, cervical     9/2022    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[facility])     around age 3 or 4 - has EKG done every 2 years    Severe menstrual cramps Visual impairment     Yeast infection        Past Surgical History:   Procedure Laterality Date    NO PAST SURGERIES         Family History   Problem Relation Age of Onset    No Known Problems Mother     No Known Problems Father     No Known Problems Sister     No Known Problems Brother     No Known Problems Sister     No Known Problems Sister     No Known Problems Brother     No Known Problems Brother     Cancer Family         colon    Breast cancer Paternal Grandmother     Diabetes Paternal Grandmother     Hypertension Maternal Grandmother      I have reviewed and agree with the history as documented. E-Cigarette/Vaping    E-Cigarette Use Former User      E-Cigarette/Vaping Substances    Nicotine Yes     THC No     CBD No     Flavoring No     Other No     Unknown No      Social History     Tobacco Use    Smoking status: Never    Smokeless tobacco: Never   Vaping Use    Vaping Use: Former    Substances: Nicotine   Substance Use Topics    Alcohol use: Yes     Alcohol/week: 1.0 standard drink of alcohol     Types: 1 Standard drinks or equivalent per week     Comment: social    Drug use: Not Currently     Types: Marijuana     Comment: Occ        Review of Systems   All other systems reviewed and are negative. Physical Exam  ED Triage Vitals   Temperature Pulse Respirations Blood Pressure SpO2   12/02/23 1737 12/02/23 1737 12/02/23 1737 12/02/23 1737 12/02/23 1737   98.4 °F (36.9 °C) 83 18 135/65 100 %      Temp Source Heart Rate Source Patient Position - Orthostatic VS BP Location FiO2 (%)   12/02/23 1737 12/02/23 1737 12/02/23 1830 12/02/23 1737 --   Oral Monitor Lying Right arm       Pain Score       12/02/23 1830       No Pain             Orthostatic Vital Signs  Vitals:    12/02/23 1737 12/02/23 1830 12/02/23 2030   BP: 135/65 131/73 123/57   Pulse: 83 78 92   Patient Position - Orthostatic VS:  Lying Lying       Physical Exam  Vitals and nursing note reviewed.    Constitutional:       General: She is not in acute distress. Appearance: She is well-developed and normal weight. She is not ill-appearing or toxic-appearing. HENT:      Head: Normocephalic and atraumatic. Mouth/Throat:      Mouth: Mucous membranes are moist.      Pharynx: Oropharynx is clear. Eyes:      Extraocular Movements: Extraocular movements intact. Conjunctiva/sclera: Conjunctivae normal.   Neck:      Vascular: No JVD. Cardiovascular:      Rate and Rhythm: Normal rate and regular rhythm. Pulses: Normal pulses. Heart sounds: Normal heart sounds. No murmur heard. Pulmonary:      Effort: Pulmonary effort is normal. No tachypnea or respiratory distress. Breath sounds: Normal breath sounds. No decreased breath sounds, wheezing, rhonchi or rales. Chest:      Chest wall: No tenderness. Abdominal:      Palpations: Abdomen is soft. Tenderness: There is no abdominal tenderness. Musculoskeletal:         General: No swelling. Cervical back: Neck supple. Right lower leg: No tenderness. No edema. Left lower leg: No tenderness. No edema. Skin:     General: Skin is warm and dry. Capillary Refill: Capillary refill takes less than 2 seconds. Findings: No erythema. Neurological:      General: No focal deficit present. Mental Status: She is alert and oriented to person, place, and time. Psychiatric:         Mood and Affect: Mood is anxious.          ED Medications  Medications   sodium chloride 0.9 % bolus 1,000 mL (0 mL Intravenous Stopped 12/2/23 2010)       Diagnostic Studies  Results Reviewed       Procedure Component Value Units Date/Time    Basic metabolic panel [[id number]] Collected: 12/02/23 1916    Lab Status: Final result Specimen: Blood Updated: 12/02/23 1941     Sodium 137 mmol/L      Potassium 3.8 mmol/L      Chloride 104 mmol/L      CO2 25 mmol/L      ANION GAP 8 mmol/L      BUN 15 mg/dL      Creatinine 0.71 mg/dL      Glucose 86 mg/dL      Calcium 8.7 mg/dL      eGFR 121 ml/min/1.73sq m     Narrative:      National Kidney Disease Foundation guidelines for Chronic Kidney Disease (CKD):     Stage 1 with normal or high GFR (GFR > 90 mL/min/1.73 square meters)    Stage 2 Mild CKD (GFR = 60-89 mL/min/1.73 square meters)    Stage 3A Moderate CKD (GFR = 45-59 mL/min/1.73 square meters)    Stage 3B Moderate CKD (GFR = 30-44 mL/min/1.73 square meters)    Stage 4 Severe CKD (GFR = 15-29 mL/min/1.73 square meters)    Stage 5 End Stage CKD (GFR <15 mL/min/1.73 square meters)  Note: GFR calculation is accurate only with a steady state creatinine    CBC and differential [[id number]]  (Abnormal) Collected: 12/02/23 1916    Lab Status: Final result Specimen: Blood Updated: 12/02/23 1920     WBC 10.63 Thousand/uL      RBC 4.51 Million/uL      Hemoglobin 11.5 g/dL      Hematocrit 36.2 %      MCV 80 fL      MCH 25.5 pg      MCHC 31.8 g/dL      RDW 15.1 %      MPV 10.4 fL      Platelets 123 Thousands/uL      nRBC 0 /100 WBCs      Neutrophils Relative 65 %      Immat GRANS % 0 %      Lymphocytes Relative 25 %      Monocytes Relative 7 %      Eosinophils Relative 2 %      Basophils Relative 1 %      Neutrophils Absolute 6.93 Thousands/µL      Immature Grans Absolute 0.04 Thousand/uL      Lymphocytes Absolute 2.69 Thousands/µL      Monocytes Absolute 0.72 Thousand/µL      Eosinophils Absolute 0.20 Thousand/µL      Basophils Absolute 0.05 Thousands/µL                    No orders to display         Procedures  POC Pelvic US    Date/Time: 12/2/2023 8:45 PM    Performed by: Carmela Levi DO  Authorized by: Carmela Levi DO    Patient location:  ED  Other Assisting Provider: Yes (comment) (Dr. Marie)    Procedure details:     Exam Type:  Diagnostic    Indications: evaluate for IUP      Assessment for: confirm intrauterine pregnancy      Technique: Transabdominal.    Views obtained: uterus (transverse and sagittal)      Image quality: diagnostic      Image availability:  Images available in PACS  Uterine findings:     Intrauterine pregnancy: identified      Single gestation: identified    Other findings:     Free pelvic fluid: not identified      Free peritoneal fluid: not identified    Interpretation:     Ultrasound impressions: normal      Pregnancy findings: intrauterine pregnancy (IUP)    Comments:      Able to visualize small flicker of heartbeat but unable to measure on M mode. ED Course  ED Course as of 12/03/23 0112   Sat Dec 02, 2023   1942 EKG: NSR at 76 bpm, normal axis, normal intervals, no acute ST-T changes as interpreted by me       Patient is a 20-year-old female, currently 8 weeks pregnant, presenting to the ED for evaluation of lightheadedness, near syncope, and occasional dyspnea beginning today. History and clinical exam documented above. Ddx anemia, electrolyte disturbance, arrhythmia. Will obtain labs, EKG, administer IV fluids, and do a bedside US for pregnancy r/o ectopic. Patient improved after fluid administration. Has a IUP (see separate procedure note for US). Has no electrolyte abnormalities. Able to ambulate without issue in the ED. I reviewed all testing with the patient:   I gave oral return precautions for what to return for in addition to the written return precautions. The patient verbalized understanding of the discharge instructions and warnings that would necessitate return to the Emergency Department. I specifically highlighted areas of special concern regarding the written and verbal discharge instructions and return precautions. All questions were answered prior to discharge. Medical Decision Making  Amount and/or Complexity of Data Reviewed  Labs: ordered.           Disposition  Final diagnoses:   Lightheadedness   Intrauterine pregnancy     Time reflects when diagnosis was documented in both MDM as applicable and the Disposition within this note       Time User Action Codes Description Comment    12/2/2023  8:28 PM Shelton Rogers [R42] Lightheadedness     12/2/2023  8:28 PM Kusum Blair Add [Z34.90] Intrauterine pregnancy           ED Disposition       ED Disposition   Discharge    Condition   Stable    Date/Time   Sat Dec 2, 2023  8:28 PM    Comment   Jules Sykes discharge to home/self care. Follow-up Information    None         Discharge Medication List as of 12/2/2023  8:38 PM        CONTINUE these medications which have NOT CHANGED    Details   buPROPion (Wellbutrin XL) 150 mg 24 hr tablet Take 1 tablet (150 mg total) by mouth daily after breakfast, Starting Tue 9/12/2023, Until Sat 11/11/2023, Normal      hydrOXYzine HCL (ATARAX) 25 mg tablet Take 1 tablet (25 mg total) by mouth every 6 (six) hours as needed for anxiety for up to 120 doses, Starting Tue 9/12/2023, Normal      propranolol (INDERAL) 10 mg tablet Take 1 tablet (10 mg total) by mouth 3 (three) times a day as needed (Take 1 tablet up to twice per day 30 minutes before presentation as needed), Starting Tue 9/12/2023, Until Sat 11/11/2023 at 2359, Normal      sertraline (ZOLOFT) 100 mg tablet Take 1 tablet (100 mg total) by mouth daily, Starting Tue 9/12/2023, Until Sat 11/11/2023, Normal           No discharge procedures on file. PDMP Review       None             ED Provider  Attending physically available and evaluated Jules Turner. I managed the patient along with the ED Attending.     Electronically Signed by           Candida Gonzalez DO  12/03/23 3068

## 2023-12-04 ENCOUNTER — ULTRASOUND (OUTPATIENT)
Dept: OBGYN CLINIC | Facility: CLINIC | Age: 23
End: 2023-12-04

## 2023-12-04 VITALS
WEIGHT: 202 LBS | BODY MASS INDEX: 38.14 KG/M2 | DIASTOLIC BLOOD PRESSURE: 70 MMHG | HEIGHT: 61 IN | HEART RATE: 88 BPM | SYSTOLIC BLOOD PRESSURE: 126 MMHG

## 2023-12-04 DIAGNOSIS — Z13.1 DIABETES MELLITUS SCREENING: ICD-10-CM

## 2023-12-04 DIAGNOSIS — N91.2 AMENORRHEA: Primary | ICD-10-CM

## 2023-12-04 DIAGNOSIS — Z34.91 PRENATAL CARE IN FIRST TRIMESTER: ICD-10-CM

## 2023-12-04 RX ORDER — PYRIDOXINE HCL (VITAMIN B6) 25 MG
25 TABLET ORAL DAILY
Qty: 30 TABLET | Refills: 0 | Status: SHIPPED | OUTPATIENT
Start: 2023-12-04 | End: 2024-01-03

## 2023-12-04 NOTE — PROGRESS NOTES
[facility]  Obstetrics & Gynecology  2023    S: 26 y.o.  who presents for viability scan. Her LMP was 10/8/2023 and she is 8 weeks and 2 days by her LMP. She reports she usually has regular periods that last 5-6 days, no heavy bleeding or clots. She took a pregnancy test at home that positive. She previously had a nexplanon. It was removed last year. She has had some mild cramping, but no vaginal bleeding. No vaginal odor or irritation. Patient reports recurrent yeast infections. She has been feeling nauseous but no vomiting. She feels better when she stays fed. Oranges have helped. She denies abdominal or pelvic pain. She has back pain and sciatica at baseline. These have not felt different since pregnancy. This pregnancy was unplanned but desired. No previous pregnancies. Medical problems include: DDD and sciatica. Surgical History: denies. Current medications: none. Smoking/drinking/illicit drug use: rare alcohol, stopped when pregnant. Previously vaped, then switched to nicotine pouches, off everything since pregnancy. No other drug use. Previously on zoloft, attarax, propranolol. Off all of them. Feeling mostly okay but more anxious with new life changes. Will start with unisom at this time and can consider restarting other anxiolytics if needed. 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 O:  Vitals:    23 1550   BP: 126/70   Pulse: 88   Weight: 91.6 kg (202 lb)   Height: 5' 1" (1.549 m)     Body mass index is 38.17 kg/m². Imaging:  First Trimester Ultrasound  Indication: Amenorrhea, viability/dating  Comparison: None. Technique: Transvaginal imaging was performed to assess the gestation, myometrial/endometrial architecture and ovarian parenchymal detail  Findings: A single intrauterine gestation is identified with cardiac activity is detected at 176 bpm. Yolk sac is present and normal in size and appearance.  Mean crown rump length is 15.3 mm = 11 weeks 2 days. Amniotic fluid appears normal. No adnexal masses or pathologic cysts identified. No free fluid. Impression: Ultrasound dating consistent with LMP. Final BRENNA of 7/14/2024 (by LMP). A/P:  Problem List Items Addressed This Visit       Amenorrhea - Primary     - [place], viable, intrauterine pregnancy at 8w1d  - Pregnancy warning signs reviewed  - Return to office for OB nurse intake visit and prenatal H&P  - prenatal panel sent  - MFM referral sent  - early glucola and HA1C for history of recurrent yeast infections   - B6 and unisom prescribed for nausea            Relevant Orders    AMB  OB < 14 weeks single or first gestation level 1 (Completed)     Other Visit Diagnoses       Prenatal care in first trimester        Relevant Medications    pyridoxine (B-6) 25 MG tablet    doxylamine (UNISOM) 25 MG tablet    Other Relevant Orders    HIV 1/2 AG/AB w Reflex [facility] for 2 yr old and above    Hepatitis C antibody    UA (URINE) with reflex to Scope    Urine culture    Hepatitis B surface antigen    CBC and differential    Rubella antibody, IgG    Type and screen    RPR-Syphilis Screening (Total Syphilis IGG/IGM)    Hepatitis B surface antibody    Anemia Panel w/Reflex, OB    POCT hemoglobin A1c    Glucose, 1H PG            Patient discussed with Dr. William Helm.     Fareed Obrien MD  PGY-I, OB/GYN  12/4/2023, 5:30 PM

## 2023-12-04 NOTE — ASSESSMENT & PLAN NOTE
Conrad Fine[facility]  - Pregnancy warning signs reviewed  - Return to office for OB nurse intake visit and prenatal H&P  - prenatal panel sent  - MFM referral sent  - early glucola and HA1C for history of recurrent yeast infections   - B6 and unisom prescribed for nausea

## 2023-12-05 ENCOUNTER — TELEPHONE (OUTPATIENT)
Dept: OBGYN CLINIC | Facility: CLINIC | Age: 23
End: 2023-12-05

## 2023-12-05 NOTE — TELEPHONE ENCOUNTER
Pt called c/o psb vertigo, heart palpitations and head pressure, consulted with the residents, not pregnancy related , pt was advised to consult with her pcp.

## 2023-12-08 NOTE — ED ATTENDING ATTESTATION
12/2/2023  I, Monik Mart MD, saw and evaluated the patient. I have discussed the patient with the resident/non-physician practitioner and agree with the resident's/non-physician practitioner's findings, Plan of Care, and MDM as documented in the resident's/non-physician practitioner's note, except where noted. All available labs and Radiology studies were reviewed. I was present for key portions of any procedure(s) performed by the resident/non-physician practitioner and I was immediately available to provide assistance. At this point I agree with the current assessment done in the Emergency Department. I have conducted an independent evaluation of this patient a history and physical is as follows:  81-year-old female with intermittent orthostatic lightheadedness, 8 weeks pregnant. No fever syncope chest pain or shortness of breath. Benign physical exam.  Bedside ultrasound with normal IUP.   Agree with documentation  ED Course         Critical Care Time  Procedures

## 2023-12-10 DIAGNOSIS — F41.1 GAD (GENERALIZED ANXIETY DISORDER): ICD-10-CM

## 2023-12-10 RX ORDER — SERTRALINE HYDROCHLORIDE 100 MG/1
100 TABLET, FILM COATED ORAL DAILY
Qty: 60 TABLET | Refills: 0 | Status: SHIPPED | OUTPATIENT
Start: 2023-12-10 | End: 2023-12-19

## 2023-12-13 ENCOUNTER — TELEPHONE (OUTPATIENT)
Dept: OBGYN CLINIC | Facility: CLINIC | Age: 23
End: 2023-12-13

## 2023-12-13 NOTE — TELEPHONE ENCOUNTER
Patient called in asking if there is anyway a letter could be added to her account for proof of pregnancy due to her having an appointment with [facility] today, informed her the nurse we have here today is going to look into it for her. Patient understood and had no further questions or concerns. Render Post-Care Instructions In Note?: no Detail Level: Simple Duration Of Freeze Thaw-Cycle (Seconds): 20 Medical Necessity Information: It is in your best interest to select a reason for this procedure from the list below. All of these items fulfill various CMS LCD requirements except the new and changing color options. Number Of Freeze-Thaw Cycles: 1 freeze-thaw cycle Medical Necessity Clause: This procedure was medically necessary because the lesions that were treated were: Post-Care Instructions: I reviewed with the patient in detail post-care instructions. Patient is to wear sunprotection, and avoid picking at any of the treated lesions. Pt may apply Vaseline to crusted or scabbing areas. Consent: The patient's consent was obtained including but not limited to risks of crusting, scabbing, blistering, scarring, darker or lighter pigmentary change, recurrence, incomplete removal and infection.

## 2023-12-19 ENCOUNTER — PATIENT OUTREACH (OUTPATIENT)
Dept: OBGYN CLINIC | Facility: CLINIC | Age: 23
End: 2023-12-19

## 2023-12-19 ENCOUNTER — INITIAL PRENATAL (OUTPATIENT)
Dept: OBGYN CLINIC | Facility: CLINIC | Age: 23
End: 2023-12-19

## 2023-12-19 DIAGNOSIS — Z59.9 FINANCIAL DIFFICULTIES: ICD-10-CM

## 2023-12-19 DIAGNOSIS — F41.1 GAD (GENERALIZED ANXIETY DISORDER): ICD-10-CM

## 2023-12-19 DIAGNOSIS — Z13.31 POSITIVE DEPRESSION SCREENING: ICD-10-CM

## 2023-12-19 DIAGNOSIS — F33.1 MDD (MAJOR DEPRESSIVE DISORDER), RECURRENT EPISODE, MODERATE (HCC): ICD-10-CM

## 2023-12-19 DIAGNOSIS — Z34.91 PRENATAL CARE IN FIRST TRIMESTER: Primary | ICD-10-CM

## 2023-12-19 DIAGNOSIS — Z59.41 FOOD INSECURITY: ICD-10-CM

## 2023-12-19 PROCEDURE — T1001 NURSING ASSESSMENT/EVALUATN: HCPCS

## 2023-12-19 RX ORDER — SERTRALINE HYDROCHLORIDE 100 MG/1
100 TABLET, FILM COATED ORAL DAILY
Qty: 60 TABLET | Refills: 0 | Status: SHIPPED | OUTPATIENT
Start: 2023-12-19

## 2023-12-19 RX ORDER — BUPROPION HYDROCHLORIDE 150 MG/1
150 TABLET ORAL
Qty: 60 TABLET | Refills: 0 | Status: SHIPPED | OUTPATIENT
Start: 2023-12-19 | End: 2024-02-17

## 2023-12-19 RX ORDER — PRENATAL VIT/IRON FUM/FOLIC AC 27MG-0.8MG
1 TABLET ORAL
COMMUNITY

## 2023-12-19 SDOH — ECONOMIC STABILITY - INCOME SECURITY: PROBLEM RELATED TO HOUSING AND ECONOMIC CIRCUMSTANCES, UNSPECIFIED: Z59.9

## 2023-12-19 SDOH — ECONOMIC STABILITY - FOOD INSECURITY: FOOD INSECURITY: Z59.41

## 2023-12-19 NOTE — LETTER
Work Letter    Jules Sykes  2000  [address] [facility] [facility] [address]    Dear Jules Sykes,      12/19/23        Your employee is a patient at [facility].    We recommend that all pregnant women:    1. Have a well-ventilated workspace.  2. Wear low-heeled shoes.  3. Work no more than 40 hours per week.  4. Have a 15 minute break every 2 hours and at least 30 minutes for a meal break.   5. Use good body mechanics by bending at your knees to avoid back strain and lift no more than 20 pounds without assistance. Will need assistance with lifting over 20 lbs.   6. Have ready access to bathrooms and water.      She may continue to work until her due date unless medical complications arise. We anticipate she may return to work in 6-8 weeks after delivery.     Sincerely,  [facility]

## 2023-12-19 NOTE — PROGRESS NOTES
OB INTAKE INTERVIEW  Pt presents for OB intake.     OB History    Para Term  AB Living   1 0 0 0 0 0   SAB IAB Ectopic Multiple Live Births   0 0 0 0 0      # Outcome Date GA Lbr Anthony/2nd Weight Sex Delivery Anes PTL Lv   1 Current              Hx of  delivery prior to 36 weeks 6 days:  N/A   If yes, place a referral for cervical surveillance at 16 weeks.   Last Menstrual Period:    Patient's last menstrual period was 10/08/2023 (approximate).     Ultrasound date: 2023  8 weeks 2 days     Estimated Date of Delivery: 2024.   by LMP  H&P visit scheduled. 2024 @ 1030  with Dr. Almazan     Last pap smear: 2022  Findings; lab pap smear results: no abnormalities    Current Issues:  Constipation :   No  Headaches :   Yes, attributes to poor nutrition and recent stop of medications  Cramping:  Yes, resolved  Spotting :   No  PICA cravings :  Yes, hand   FOB Involved:   Yes  Planned pregnancy:  No  I have these concerns about this prenatal patient:   Hx anxiety and depression. Depression screening of 18 at today's visit. Denies suicidal ideations. Recently taken off zoloft and wellbutrin by psychiatrist d/t pregnancy. Interested in restarting these medications. Request forwarded to Dr. Almazan.   Financial and food insecurity. [facility] letter previously provided. To meet with Care Management post intake  Some nausea and food aversions. Currently managed well with Vit B6 and Unisom. Admits she needs to eat and hydrate more frequently as this alleviates many of her pregnancy symptoms  Unsure about epidural in labor r/t preexisting back condition and chronic pain  Desires Nurse Family Partnership referral. Referral placed.  Reminded to complete 1st trimester labs  MFM referral placed today    Interview education  [facility] Pregnancy Essentials reviewed and discussed   [facility] Handout  [facility] Handout  Discussed genetic testing  Prenatal lab work: Scripts  printed and given to pt.   Influenza vaccine given today: No, pt has already received  Discussed Tdap vaccine.   Immunizations:   Immunization History   Administered Date(s) Administered    COVID-19 MODERNA VACC 0.25 ML IM BOOSTER 01/04/2022    DTP 2001, 2001, 05/22/2003, 05/14/2004, 01/06/2006    HPV Quadrivalent 04/26/2012, 10/23/2012, 02/20/2013    Hep A, adult 05/05/2008, 11/13/2008    Hep B, adult 2001, 05/22/2003, 08/04/2004    Hib (PRP-OMP) 2001, 2001, 05/25/2011, 05/22/2013    INFLUENZA 09/20/2021    IPV 01/06/2006    Influenza, injectable, quadrivalent, preservative free 0.5 mL 09/17/2020    Influenza, seasonal, injectable 01/22/2013    MMR 05/22/2003, 01/06/2006    Meningococcal B, Recombinant (TRUMENBA) 08/12/2019    Meningococcal MCV4P 08/10/2018    Meningococcal, Unknown Serogroups 04/26/2012    OPV 2001, 2001, 05/14/2004, 01/06/2006    Pneumococcal Conjugate PCV 7 2001    Tdap 04/26/2012    Tuberculin Skin Test-PPD Intradermal 05/14/2004    Varicella 07/10/2003, 11/20/2010    influenza, injectable, quadrivalent 11/08/2018     Depression Screening Follow-up Plan: Patient's depression screening was positive with an [place] score of   18  Patient assessed for underlying major depression. They have no active suicidal ideations. Brief counseling provided and recommend additional follow-up/re-evaluation next office visit..  Nurse/Family Partnership- referral placed:  Yes   If yes, place referral for nurse family partnership  Tobacco Cessation Counseling: former   Infection Screening: Does the pt have a hx of MRSA? No  If yes- please follow MRSA protocol and obtain a nasal swab for MRSA culture  The patient was oriented to our practice and all questions were answered.  Interviewed by: anita woods RN 12/19/23

## 2023-12-19 NOTE — PROGRESS NOTES
CHRIST CAVANAUGH met with 24 y/o- S-G1-  English speaking woman for pre  assessment. Pt resides with FOB and reported both are content with the unintended pregnancy. Pt reported it has been an emotional roller coaster and took her sometimes to accept it. Pt denies any usage of drug, alcohol or smoking. Pt reported Hx of Depression / Anxiety. Psychiatrist stopped her medication due to the pregnancy. Pt's depression score today is 18 with no SI / HI.  Pt endorses sad feelings, loss of motivation and tiredness. Pt has been out of work due due to n/v. CHRIST CAVANAUGH provided listening counseling and reminded Pt that at the moment she is dealing with many changes ans it is important to take one day at a time, take her pre radha vitamins and restart her normal routine little by little. Also to be open with FOB and let him know how she is feeling so he can support her accordingly.    Pt denies Domestic Violence hx. Pt claimed FOB and extended family are supportive. 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 Pt denies question regarding pregnancy. CHRIST CAVANAUGH explained her role and gave contact information. CHRIST CAVANAUGH discussed resuming therapy and Pt will consider it. Pr was encouraged to call CHRIST CAVANAUGH at any time needed.

## 2023-12-19 NOTE — LETTER
Proof of Pregnancy Letter    Jules Sykes  2000  [address]  [place] [address]        12/19/23      Vision Greg Sykes is a patient at our facility. Jules Sykes Estimated Date of Delivery: 7/14/2024       Any questions or concerns, please feel free to contact our office.    Sincerely,    Counts include 234 beds at the [facility]    [address]  [address]   [place] [id number] [phone]

## 2023-12-19 NOTE — LETTER
Dentist Letter    Jules Greg Sykes  2000  [address] [facility] [facility] [address]          12/19/23    We have had several requests from local dentist requesting permission to perform procedures on our patients who are pregnant. We wish to respond with this letter regarding some of the more routine procedures that we have been asked about.    The following procedures may be performed on our obstetric patients:   1. Administration of local anesthesia   2. Administration of antibiotics such as PCN, Ampicillin, and Erythromycin.   3. Administration of pain medications such as Tylenol, Tylenol with codeine, and if needed Percocet.   4. Shielded X-rays    Should you have any questions, please do not hesitate to contact at [phone].        Sincerely,    [facility]   [phone]

## 2023-12-20 ENCOUNTER — APPOINTMENT (OUTPATIENT)
Dept: LAB | Facility: CLINIC | Age: 23
End: 2023-12-20
Payer: MEDICARE

## 2023-12-20 DIAGNOSIS — Z13.1 DIABETES MELLITUS SCREENING: ICD-10-CM

## 2023-12-20 DIAGNOSIS — Z34.91 PRENATAL CARE IN FIRST TRIMESTER: ICD-10-CM

## 2023-12-20 LAB
ABO GROUP BLD: NORMAL
BASOPHILS # BLD AUTO: 0.03 THOUSANDS/ÂΜL (ref 0–0.1)
BASOPHILS NFR BLD AUTO: 0 % (ref 0–1)
BLD GP AB SCN SERPL QL: NEGATIVE
EOSINOPHIL # BLD AUTO: 0.13 THOUSAND/ÂΜL (ref 0–0.61)
EOSINOPHIL NFR BLD AUTO: 2 % (ref 0–6)
ERYTHROCYTE [DISTWIDTH] IN BLOOD BY AUTOMATED COUNT: 15.6 % (ref 11.6–15.1)
EST. AVERAGE GLUCOSE BLD GHB EST-MCNC: 108 MG/DL
GLUCOSE 1H P 50 G GLC PO SERPL-MCNC: 98 MG/DL (ref 40–134)
HBA1C MFR BLD: 5.4 %
HCT VFR BLD AUTO: 37.7 % (ref 34.8–46.1)
HGB BLD-MCNC: 12.3 G/DL (ref 11.5–15.4)
HIV 1+2 AB+HIV1 P24 AG SERPL QL IA: NORMAL
HIV 2 AB SERPL QL IA: NORMAL
HIV1 AB SERPL QL IA: NORMAL
HIV1 P24 AG SERPL QL IA: NORMAL
IMM GRANULOCYTES # BLD AUTO: 0.03 THOUSAND/UL (ref 0–0.2)
IMM GRANULOCYTES NFR BLD AUTO: 0 % (ref 0–2)
LYMPHOCYTES # BLD AUTO: 2.07 THOUSANDS/ÂΜL (ref 0.6–4.47)
LYMPHOCYTES NFR BLD AUTO: 25 % (ref 14–44)
MCH RBC QN AUTO: 26.7 PG (ref 26.8–34.3)
MCHC RBC AUTO-ENTMCNC: 32.6 G/DL (ref 31.4–37.4)
MCV RBC AUTO: 82 FL (ref 82–98)
MONOCYTES # BLD AUTO: 0.47 THOUSAND/ÂΜL (ref 0.17–1.22)
MONOCYTES NFR BLD AUTO: 6 % (ref 4–12)
NEUTROPHILS # BLD AUTO: 5.49 THOUSANDS/ÂΜL (ref 1.85–7.62)
NEUTS SEG NFR BLD AUTO: 67 % (ref 43–75)
NRBC BLD AUTO-RTO: 0 /100 WBCS
PLATELET # BLD AUTO: 293 THOUSANDS/UL (ref 149–390)
PMV BLD AUTO: 10.9 FL (ref 8.9–12.7)
RBC # BLD AUTO: 4.6 MILLION/UL (ref 3.81–5.12)
RH BLD: POSITIVE
RUBV IGG SERPL IA-ACNC: 13.7 IU/ML
SPECIMEN EXPIRATION DATE: NORMAL
TREPONEMA PALLIDUM IGG+IGM AB [PRESENCE] IN SERUM OR PLASMA BY IMMUNOASSAY: NORMAL
WBC # BLD AUTO: 8.22 THOUSAND/UL (ref 4.31–10.16)

## 2023-12-20 PROCEDURE — 87340 HEPATITIS B SURFACE AG IA: CPT

## 2023-12-20 PROCEDURE — 87086 URINE CULTURE/COLONY COUNT: CPT

## 2023-12-20 PROCEDURE — 83036 HEMOGLOBIN GLYCOSYLATED A1C: CPT

## 2023-12-20 PROCEDURE — 85025 COMPLETE CBC W/AUTO DIFF WBC: CPT

## 2023-12-20 PROCEDURE — 86706 HEP B SURFACE ANTIBODY: CPT

## 2023-12-20 PROCEDURE — 87389 HIV-1 AG W/HIV-1&-2 AB AG IA: CPT

## 2023-12-20 PROCEDURE — 86803 HEPATITIS C AB TEST: CPT

## 2023-12-20 PROCEDURE — 86900 BLOOD TYPING SEROLOGIC ABO: CPT

## 2023-12-20 PROCEDURE — 86780 TREPONEMA PALLIDUM: CPT

## 2023-12-20 PROCEDURE — 86901 BLOOD TYPING SEROLOGIC RH(D): CPT

## 2023-12-20 PROCEDURE — 82950 GLUCOSE TEST: CPT

## 2023-12-20 PROCEDURE — 36415 COLL VENOUS BLD VENIPUNCTURE: CPT

## 2023-12-20 PROCEDURE — 86850 RBC ANTIBODY SCREEN: CPT

## 2023-12-20 PROCEDURE — 86762 RUBELLA ANTIBODY: CPT

## 2023-12-21 LAB
HBV SURFACE AB SER-ACNC: <3 MIU/ML
HBV SURFACE AG SER QL: NORMAL
HCV AB SER QL: NORMAL

## 2023-12-22 LAB — BACTERIA UR CULT: NORMAL

## 2024-01-02 NOTE — PSYCH
Virtual Regular Visit    Verification of patient location:    Patient is located in the following state in which I hold an active license [place]      Assessment/Plan:    Problem List Items Addressed This Visit        Other    Generalized anxiety disorder - Primary    Depression          Goals addressed in session: Goal 1 and Goal 2          Reason for visit is No chief complaint on file  Encounter provider Zhanna Brown    Provider located at [address] [address] [address]  [facility] [id number] [phone]      Recent Visits  No visits were found meeting these conditions  Showing recent visits within past 7 days and meeting all other requirements  Future Appointments  No visits were found meeting these conditions  Showing future appointments within next 150 days and meeting all other requirements       The patient was identified by name and date of birth  Chelsi Lyle was informed that this is a telemedicine visit and that the visit is being conducted throughEpic Embedded and patient was informed this is a secure, HIPAA-complaint platform  She agrees to proceed  My office door was closed  No one else was in the room  She acknowledged consent and understanding of privacy and security of the video platform  The patient has agreed to participate and understands they can discontinue the visit at any time  Patient is aware this is a billable service  Subjective  Chelsi Lyle is a 25 y o  female     D: Clinician met with Vision via telehealth for individual therapy  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  She reports that she has been able to keep up with class work and is thankful for good study habits and prerequisite teachers that prepared her for nursing school   She reports that she has been doing well with weekend work so far and feels less anxiety about her schedule  Vision reports recently being sick and was able to identify a few trigger such and not eating well, exercising or drinking enough water and was able to problem solve ways to keep up with these self care tasks in the future in attempts to avoid getting sick and overall feel better  Clinician updated treatment plan goals  A: Vision was open and engaged in the session  She reports working to increase self care during her week off and incorporate changes in her daily schedule  P: Continue to meet with Vision every other week for individual therapy  P: Continue to meet    HPI     Past Medical History:   Diagnosis Date    Anxiety     Depression 2/1/2022    FTND (full term normal delivery) 2001    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[facility])     around age 4 or 4 - has EKG done every 2 years    Severe menstrual cramps     Visual impairment     Yeast infection        Past Surgical History:   Procedure Laterality Date    NO PAST SURGERIES         Current Outpatient Medications   Medication Sig Dispense Refill    amoxicillin (AMOXIL) 875 mg tablet Take 1 tablet (875 mg total) by mouth 2 (two) times a day for 7 days 14 tablet 0    brompheniramine-pseudoephedrine-DM 30-2-10 MG/5ML syrup Take 5 mL by mouth 4 (four) times a day as needed for allergies 180 mL 0    etonogestrel (NEXPLANON) subdermal implant Inject 68 mg under the skin      fluticasone (FLONASE) 50 mcg/act nasal spray 1 spray into each nostril daily 11 1 mL 1     No current facility-administered medications for this visit  No Known Allergies    Review of Systems    Video Exam    There were no vitals filed for this visit  Physical Exam     I spent 45 minutes directly with the patient during this visit    VIRTUAL VISIT Keerthi Sykes verbally agrees to participate in [employer]   Pt is aware that Virtual Care Services could be limited without vital signs or the ability to perform a full hands-on physical [place] On[place] understands she or the provider may request at any time to terminate the video visit and request the patient to seek care or treatment in person  Quality 226: Preventive Care And Screening: Tobacco Use: Screening And Cessation Intervention: Patient screened for tobacco use and is an ex/non-smoker Detail Level: Detailed Quality 431: Preventive Care And Screening: Unhealthy Alcohol Use - Screening: Patient not identified as an unhealthy alcohol user when screened for unhealthy alcohol use using a systematic screening method

## 2024-01-03 PROBLEM — R51.9 FREQUENT HEADACHES: Status: RESOLVED | Noted: 2018-08-10 | Resolved: 2024-01-03

## 2024-01-03 PROBLEM — B96.89 BV (BACTERIAL VAGINOSIS): Status: RESOLVED | Noted: 2022-12-04 | Resolved: 2024-01-03

## 2024-01-03 PROBLEM — Z30.09 ENCOUNTER FOR COUNSELING REGARDING CONTRACEPTION: Status: RESOLVED | Noted: 2020-04-29 | Resolved: 2024-01-03

## 2024-01-03 PROBLEM — B96.89 BACTERIAL VAGINOSIS: Status: RESOLVED | Noted: 2021-10-26 | Resolved: 2024-01-03

## 2024-01-03 PROBLEM — N76.0 BACTERIAL VAGINOSIS: Status: RESOLVED | Noted: 2021-10-26 | Resolved: 2024-01-03

## 2024-01-03 PROBLEM — N94.6 DYSMENORRHEA IN ADOLESCENT: Status: RESOLVED | Noted: 2018-08-10 | Resolved: 2024-01-03

## 2024-01-03 PROBLEM — B37.31 VAGINAL YEAST INFECTION: Status: RESOLVED | Noted: 2021-10-26 | Resolved: 2024-01-03

## 2024-01-03 PROBLEM — Z30.017 NEXPLANON INSERTION: Status: RESOLVED | Noted: 2020-05-21 | Resolved: 2024-01-03

## 2024-01-03 PROBLEM — N76.0 BV (BACTERIAL VAGINOSIS): Status: RESOLVED | Noted: 2022-12-04 | Resolved: 2024-01-03

## 2024-01-03 PROBLEM — H54.7 DECREASED VISION: Status: RESOLVED | Noted: 2018-08-10 | Resolved: 2024-01-03

## 2024-01-04 ENCOUNTER — ROUTINE PRENATAL (OUTPATIENT)
Dept: OBGYN CLINIC | Facility: CLINIC | Age: 24
End: 2024-01-04

## 2024-01-04 VITALS
HEIGHT: 61 IN | DIASTOLIC BLOOD PRESSURE: 85 MMHG | HEART RATE: 93 BPM | SYSTOLIC BLOOD PRESSURE: 126 MMHG | WEIGHT: 201 LBS | BODY MASS INDEX: 37.95 KG/M2

## 2024-01-04 DIAGNOSIS — B96.89 BACTERIAL VAGINOSIS IN PREGNANCY: Primary | ICD-10-CM

## 2024-01-04 DIAGNOSIS — O23.599 BACTERIAL VAGINOSIS IN PREGNANCY: Primary | ICD-10-CM

## 2024-01-04 DIAGNOSIS — F41.1 GENERALIZED ANXIETY DISORDER: ICD-10-CM

## 2024-01-04 DIAGNOSIS — Z3A.12 12 WEEKS GESTATION OF PREGNANCY: ICD-10-CM

## 2024-01-04 PROBLEM — N91.2 AMENORRHEA: Status: RESOLVED | Noted: 2023-12-04 | Resolved: 2024-01-04

## 2024-01-04 PROBLEM — M54.9 BACK PAIN: Status: RESOLVED | Noted: 2018-08-10 | Resolved: 2024-01-04

## 2024-01-04 PROBLEM — Z87.39 HISTORY OF KAWASAKI'S DISEASE: Status: ACTIVE | Noted: 2024-01-04

## 2024-01-04 PROBLEM — R41.3 MEMORY CHANGES: Status: RESOLVED | Noted: 2018-08-10 | Resolved: 2024-01-04

## 2024-01-04 PROCEDURE — 87491 CHLMYD TRACH DNA AMP PROBE: CPT | Performed by: OBSTETRICS & GYNECOLOGY

## 2024-01-04 PROCEDURE — 87591 N.GONORRHOEAE DNA AMP PROB: CPT | Performed by: OBSTETRICS & GYNECOLOGY

## 2024-01-04 PROCEDURE — 99213 OFFICE O/P EST LOW 20 MIN: CPT | Performed by: OBSTETRICS & GYNECOLOGY

## 2024-01-04 RX ORDER — METRONIDAZOLE 500 MG/1
500 TABLET ORAL EVERY 12 HOURS SCHEDULED
Qty: 14 TABLET | Refills: 0 | Status: SHIPPED | OUTPATIENT
Start: 2024-01-04 | End: 2024-01-11

## 2024-01-04 RX ORDER — ASPIRIN 81 MG/1
162 TABLET, CHEWABLE ORAL DAILY
Qty: 60 TABLET | Refills: 6 | Status: SHIPPED | OUTPATIENT
Start: 2024-01-04 | End: 2024-07-14

## 2024-01-04 NOTE — PROGRESS NOTES
OB/GYN  PRENATAL H&P VISIT  Vision Greg Sykes  2024  11:52 AM  Dr. Dagmar Almazan, DO      SUBJECTIVE  Vision Greg Sykes is a 23 y.o.  at Unknown here for initial prenatal H&P.     She is currently doing well. She denies vaginal bleeding, cramping, leakage, abnormal discharge.  Vision was having some nausea and vomiting earlier in the pregnancy which was currently well-managed with vitamin B6 and Unisom.    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   - She denies hx of STD/STI, denies a hx of TB or close contacts with persons with TB. She has never had MRSA.   - She occasionally uses nicotine powder (flavored, not tobacco) denies recreational drug use. She denies use of ETOH.    Family History:  - She denies a family history of inheritable conditions such as physical or intellectual disabilities, birth defects, blood disorders, heart or neural tube defects.  No history of thalassemias in the family, no history of Down syndrome, no history of hemophilia's, no history of cystic fibrosis  - Sister has a history of preeclampsia, diabetes, hypertension   -Brother has a heart murmur      OB History    Para Term  AB Living   1 0 0 0 0 0   SAB IAB Ectopic Multiple Live Births   0 0 0 0 0      # Outcome Date GA Lbr Anthony/2nd Weight Sex Delivery Anes PTL Lv   1 Current                  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 - has EKG done every 2 years    Memory changes 08/10/2018       Past Surgical History:   Procedure Laterality Date    NO PAST SURGERIES         Social History     Socioeconomic History    Marital status: Single     Spouse  name: Not on file    Number of children: 0    Years of education: 14    Highest education level: Some college, no degree   Occupational History    Not on file   Tobacco Use    Smoking status: Never    Smokeless tobacco: Never   Vaping Use    Vaping status: Former    Substances: Nicotine   Substance and Sexual Activity    Alcohol use: Not Currently     Alcohol/week: 1.0 standard drink of alcohol     Types: 1 Standard drinks or equivalent per week     Comment: social    Drug use: Not Currently     Types: Marijuana     Comment: Occ    Sexual activity: Yes     Partners: Male     Birth control/protection: None   Other Topics Concern    Not on file   Social History Narrative    Not on file     Social Determinants of Health     Financial Resource Strain: Low Risk  (12/19/2023)    Overall Financial Resource Strain (CARDIA)     Difficulty of Paying Living Expenses: Not very hard   Food Insecurity: Food Insecurity Present (12/19/2023)    Hunger Vital Sign     Worried About Running Out of Food in the Last Year: Sometimes true     Ran Out of Food in the Last Year: Sometimes true   Transportation Needs: No Transportation Needs (12/19/2023)    PRAPARE - Transportation     Lack of Transportation (Medical): No     Lack of Transportation (Non-Medical): No   Physical Activity: Not on file   Stress: No Stress Concern Present (12/19/2023)    [employer] [employer] - Occupational Stress Questionnaire     Feeling of Stress : Only a little   Social Connections: Moderately Isolated (12/19/2023)    Social Connection and Isolation Panel [NHANES]     Frequency of Communication with Friends and Family: Twice a week     Frequency of Social Gatherings with Friends and Family: Once a week     Attends Sikhism Services: Never     Active Member of Clubs or Organizations: No     Attends Club or Organization Meetings: Never     Marital Status: Living with partner   Intimate Partner Violence: Not At Risk (12/19/2023)    Humiliation,  "Afraid, Rape, and Kick questionnaire     Fear of Current or Ex-Partner: No     Emotionally Abused: No     Physically Abused: No     Sexually Abused: No   Housing Stability: Low Risk  (2024)    Housing Stability Vital Sign     Unable to Pay for Housing in the Last Year: No     Number of Places Lived in the Last Year: 1     Unstable Housing in the Last Year: No       OBJECTIVE  Vitals:    24 1054   BP: 126/85   Pulse: 93       General: Well appearing, no distress  Respiratory: Unlabored breathing  Cardiovascular: Regular rate.  Pelvic: thin white discharge with fishy odor, clue cells seen under wet mount  Abdomen: Soft, gravid, nontender  Fundal Height: Appropriate for gestational age.  Extremities: Warm and well perfused.  Non tender.    ASSESSMENT AND PLAN    23 y.o., , with /85 (BP Location: Left arm, Patient Position: Sitting, Cuff Size: Large)   Pulse 93   Ht 5' 1\" (1.549 m)   Wt 91.2 kg (201 lb)   LMP 10/08/2023 (Approximate) , at 12w4d   here for her prenatal H&P.    Pregnancy: H&P completed today. PN Labs reviewed today. She is non immune to rubella and Hep B Labor expectations discussed with patient, including appointment schedule, nutrition, exercise, medications, sexual intercourse, and nausea/vomiting. Patient's BMI is 37. Recommended weight gain is 11-20 lbs.  by TAUS                Screening: Pap smear UTD. GC/CT collected. Genetic screening reviewed with patient - will proceed with . MSAFP discussed to be done between 16-18w and ordered.       Problem List       Myopia    Flexural eczema    Generalized anxiety disorder    Overview     Maintained on Wellbutrin and Zoloft         Depression    Overview     Continue on Wellbutrin and Zoloft, follow-up with psychiatrist         Herniated lumbar intervertebral disc    Overview     Would like to proceed with an anesthesia consult later in pregnancy         12 weeks gestation of pregnancy    Overview     Overview:  Labs  Pap " smear 2022 NILM and GC/CTcollected  Prenatal panel rev (non immune Hep B, Rubella)  MSAFP ordered, to be done between 16-18w  28w labs  Vaccines:  Flu vaccine: received through work in 2023  Covid vaccine: s/p 2 doses  Tdap vaccine: offer at 28 w  RSV: offer at 32w  Contraception: deciding, considering IUD   to be taken until 36 weeks preeclampsia prophylaxis  Feeding plan: breast  Birth plan:  with epidural  Delivery consent: to be signed at 28w  Ultrasounds: Level 1 scheduled for 1/10/24         History of Kawasaki's disease    Overview     Recommended an echocardiogram this pregnancy          Other Visit Diagnoses       Bacterial vaginosis in pregnancy    -  Primary            Dagmar Almazan DO  2024  11:52 AM

## 2024-01-05 ENCOUNTER — PATIENT OUTREACH (OUTPATIENT)
Dept: OBGYN CLINIC | Facility: CLINIC | Age: 24
End: 2024-01-05

## 2024-01-05 DIAGNOSIS — Z3A.12 12 WEEKS GESTATION OF PREGNANCY: Primary | ICD-10-CM

## 2024-01-05 LAB
C TRACH DNA SPEC QL NAA+PROBE: NEGATIVE
N GONORRHOEA DNA SPEC QL NAA+PROBE: NEGATIVE

## 2024-01-10 ENCOUNTER — ROUTINE PRENATAL (OUTPATIENT)
Dept: PERINATAL CARE | Facility: CLINIC | Age: 24
End: 2024-01-10
Payer: COMMERCIAL

## 2024-01-10 DIAGNOSIS — Z34.91 PRENATAL CARE IN FIRST TRIMESTER: ICD-10-CM

## 2024-01-10 DIAGNOSIS — Z36.82 ENCOUNTER FOR (NT) NUCHAL TRANSLUCENCY SCAN: ICD-10-CM

## 2024-01-10 DIAGNOSIS — Z3A.13 13 WEEKS GESTATION OF PREGNANCY: Primary | ICD-10-CM

## 2024-01-10 DIAGNOSIS — Z87.39 HISTORY OF KAWASAKI'S DISEASE: ICD-10-CM

## 2024-01-10 DIAGNOSIS — O99.211 OBESITY AFFECTING PREGNANCY IN FIRST TRIMESTER, UNSPECIFIED OBESITY TYPE: ICD-10-CM

## 2024-01-10 PROCEDURE — 76801 OB US < 14 WKS SINGLE FETUS: CPT | Performed by: OBSTETRICS & GYNECOLOGY

## 2024-01-10 PROCEDURE — 99244 OFF/OP CNSLTJ NEW/EST MOD 40: CPT | Performed by: OBSTETRICS & GYNECOLOGY

## 2024-01-10 PROCEDURE — 76813 OB US NUCHAL MEAS 1 GEST: CPT | Performed by: OBSTETRICS & GYNECOLOGY

## 2024-01-10 NOTE — PROGRESS NOTES
"[facility]: Vision Greg Sykes was seen today for nuchal translucency ultrasound.  See ultrasound report under \"OB Procedures\" tab.        My recommendations are as follows:    1.  We reviewed the availability of genetic screening, as well as diagnostic testing, which are available to all pregnant women. We reviewed limitations, risks, and benefits of screening and testing.  She shares she is likely interested in Non Invasive Prenatal Screening (NIPS) but prefers to call her insurance before having this drawn.  She does not wish to pursue diagnostic testing at this time.  - Information provided to call our office back if she wishes to have NIPS drawn  - MSAFP screening should be ordered through your office at 15-20 weeks gestation, and completed prior to fetal anatomic survey.  - A detailed anatomic survey as well as transvaginal cervical length screening are recommended between 18-22 weeks gestation.    2.  Body mass index > 30 kg/m2 is associated with an increased risk of several pregnancy complications, including hypertensive disorders, diabetes, abnormal fetal growth, fetal malformations. The risk of  delivery is also increased, as are wound complications in the event of  delivery. A healthy diet and exercise, as well as appropriate gestational weight gain (no more than 11-20 pounds) can help reduce risk of these complications. 150 minutes of moderate exercise per week is recommended for all pregnant women. Nutrition counseling is also available if desired. Early screening for gestational diabetes is recommended, as well as routine re-screening at 24-28 weeks if early screening results are normal.  fetal surveillance is also recommended as follows:  - Pre-Pregnancy BMI 35-39.9: Evaluation of fetal growth at 32 weeks gestation, and weekly antepartum surveillance at 37 weeks gestation.    3.  Prevention of preeclampsia: Due to the risk factors of obesity, " primiparity, family history and Kawasaki's disease history she was recommended to start daily aspirin 162 mg q day for preeclampsia prevention as per ACOG Committee Opinion #743. This prophylactic medication is more effective when started prior to 16 weeks but can be started as late as 28 weeks. Use of aspirin 162 mg daily (81 milligram baby aspirin two tablets) up to 36 weeks gestation is recommended, which is a dose different than the one recommended by ACOG. New data suggests that a dose higher than 100 mg and started before 16 weeks gestation can reduce the risk of  preeclampsia (Dell GUZMÁN et al, [facility], 2017).  - This was prescribed for her by her primary OB providers and can be started today and discontinued at 36 weeks    4. Kawasaki disease: 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causes swelling and inflammation in the walls of small to medium sized blood vessels throughout the body.  It is also sometimes called mucocutaneous lymph node syndrome.  He can cause multiple symptoms including high fever swollen hands and feet with skin peeling red eyes and tongue.    Vision shares that she had all the classic features of Kawasaki as a child and it took many months to mom taking her eyes in 2-3-year-old to multiple hospitals before the diagnosis was finally made.  She was treated in the hospital she shares for many months and then had resolved symptoms/manifestations.  She then had routine echocardiograms for most of her life with the most recent being 2 years ago.  She shares that she never had any residual issues.  She has moved through multiple states over the last years so she has not had an echo performed recently nor does she have an establish care provider to follow this at this time.    We discussed that given the rarity of this disease there is limited information on the effects of 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as a child on pregnancy outcomes.  However given that she has had no residual issues and reportedly normal heart function I  suspect that she will have overall a low risk of complications in this pregnancy.  That being said I agree with the recommendation to have a echocardiogram performed and I have taken the liberty of placing a cardiology referral so that she does have scheduled follow-up.    One case series published in BJOG in 2014 (Cody et al) reviewed the course of 10 woman with a history of 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in childhood with a total of 21 pregnancies carried to term.  2 offspring developed Kawasaki disease.  Only 1 of these 10 women experienced an obstetrical complication which was that of postpartum hemorrhage.  Overall the data in this very limited case series was favorable.    We also discussed that there is a increased risk of 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in future offspring but the specific genetic mutation or link is not yet known so no specific testing is recommended at this time.    Follow-up:  - Agree with recommendation for aspirin 162 mg daily started now until 36 weeks  - Recommend establishing care with a cardiologist and I placed a referral  -I recommend a screening maternal echocardiogram for baseline in this pregnancy and I ordered this for her today.      At the conclusion of today's encounter, all questions were answered to her satisfaction. Thank you very much for this kind referral and please do not hesitate to contact me with any further questions or concerns.    MDM:   I. Diagnoses/Problems addressed:  Aneuploidy screening, obesity, kawasaki disease  II.  Data: I reviewed notes from initial prenatal. I ordered TTE  III.  Risk of morbidity: Moderate (aspirin)    Please don't hesitate to contact our office with any concerns or questions.  -Pati Aguilar MD

## 2024-01-10 NOTE — LETTER
"January 10, 2024     Farheen Laws MD  [address]  [address]  [facility] [address]    Patient: Jules Sykes   YOB: 2000   Date of Visit: 1/10/2024       Dear Dr. Lasw:    Thank you for referring Jules Sykes to me for evaluation. Below are my notes for this consultation.    If you have questions, please do not hesitate to call me. I look forward to following your patient along with you.         Sincerely,        Pati Aguilar MD        CC: No Recipients    Pati Aguilar MD  1/10/2024  5:04 PM  Sign when Signing Visit  [facility]: Jules Sykes was seen today for nuchal translucency ultrasound.  See ultrasound report under \"OB Procedures\" tab.        My recommendations are as follows:    1.  We reviewed the availability of genetic screening, as well as diagnostic testing, which are available to all pregnant women. We reviewed limitations, risks, and benefits of screening and testing.  She shares she is likely interested in Non Invasive Prenatal Screening (NIPS) but prefers to call her insurance before having this drawn.  She does not wish to pursue diagnostic testing at this time.  - Information provided to call our office back if she wishes to have NIPS drawn  - MSAFP screening should be ordered through your office at 15-20 weeks gestation, and completed prior to fetal anatomic survey.  - A detailed anatomic survey as well as transvaginal cervical length screening are recommended between 18-22 weeks gestation.    2.  Body mass index > 30 kg/m2 is associated with an increased risk of several pregnancy complications, including hypertensive disorders, diabetes, abnormal fetal growth, fetal malformations. The risk of  delivery is also increased, as are wound complications in the event of  delivery. A healthy diet and exercise, as well as appropriate gestational weight gain (no more than 11-20 pounds) can help reduce risk of these " complications. 150 minutes of moderate exercise per week is recommended for all pregnant women. Nutrition counseling is also available if desired. Early screening for gestational diabetes is recommended, as well as routine re-screening at 24-28 weeks if early screening results are normal.  fetal surveillance is also recommended as follows:  - Pre-Pregnancy BMI 35-39.9: Evaluation of fetal growth at 32 weeks gestation, and weekly antepartum surveillance at 37 weeks gestation.    3.  Prevention of preeclampsia: Due to the risk factors of obesity, primiparity, family history and Kawasaki's disease history she was recommended to start daily aspirin 162 mg q day for preeclampsia prevention as per ACOG Committee Opinion #743. This prophylactic medication is more effective when started prior to 16 weeks but can be started as late as 28 weeks. Use of aspirin 162 mg daily (81 milligram baby aspirin two tablets) up to 36 weeks gestation is recommended, which is a dose different than the one recommended by ACOG. New data suggests that a dose higher than 100 mg and started before 16 weeks gestation can reduce the risk of  preeclampsia (Dell GUZMÁN et al, [facility], 2017).  - This was prescribed for her by her primary OB providers and can be started today and discontinued at 36 weeks    4. Kawasaki disease: 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causes swelling and inflammation in the walls of small to medium sized blood vessels throughout the body.  It is also sometimes called mucocutaneous lymph node syndrome.  He can cause multiple symptoms including high fever swollen hands and feet with skin peeling red eyes and tongue.    Vision shares that she had all the classic features of Kawasaki as a child and it took many months to mom taking her eyes in 2-3-year-old to multiple hospitals before the diagnosis was finally made.  She was treated in the hospital she shares for many months and then had resolved symptoms/manifestations.  She then had  routine echocardiograms for most of her life with the most recent being 2 years ago.  She shares that she never had any residual issues.  She has moved through multiple states over the last years so she has not had an echo performed recently nor does she have an establish care provider to follow this at this time.    We discussed that given the rarity of this disease there is limited information on the effects of 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as a child on pregnancy outcomes.  However given that she has had no residual issues and reportedly normal heart function I suspect that she will have overall a low risk of complications in this pregnancy.  That being said I agree with the recommendation to have a echocardiogram performed and I have taken the liberty of placing a cardiology referral so that she does have scheduled follow-up.    One case series published in BJOG in 2014 (Cody et al) reviewed the course of 10 woman with a history of 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in childhood with a total of 21 pregnancies carried to term.  2 offspring developed Kawasaki disease.  Only 1 of these 10 women experienced an obstetrical complication which was that of postpartum hemorrhage.  Overall the data in this very limited case series was favorable.    We also discussed that there is a increased risk of 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in future offspring but the specific genetic mutation or link is not yet known so no specific testing is recommended at this time.    Follow-up:  - Agree with recommendation for aspirin 162 mg daily started now until 36 weeks  - Recommend establishing care with a cardiologist and I placed a referral  -I recommend a screening maternal echocardiogram for baseline in this pregnancy and I ordered this for her today.      At the conclusion of today's encounter, all questions were answered to her satisfaction. Thank you very much for this kind referral and please do not hesitate to contact me with any further questions or concerns.    MDM:   I.  Diagnoses/Problems addressed:  Aneuploidy screening, obesity, kawasaki disease  II.  Data: I reviewed notes from initial prenatal.  III.  Risk of morbidity: Moderate (aspirin)    Please don't hesitate to contact our office with any concerns or questions.  -Pati Aguilar MD

## 2024-01-22 DIAGNOSIS — F41.9 ANXIETY: ICD-10-CM

## 2024-01-22 RX ORDER — HYDROXYZINE HYDROCHLORIDE 25 MG/1
25 TABLET, FILM COATED ORAL EVERY 6 HOURS PRN
Qty: 120 TABLET | Refills: 0 | Status: SHIPPED | OUTPATIENT
Start: 2024-01-22

## 2024-01-29 NOTE — PROGRESS NOTES
[facility] WOMEN'S HEALTH   PRENATAL VISIT  Jules Sykes  [id number]  2000        ASSESSMENT/PLAN:  Problem List          Musculoskeletal and Integument    Flexural eczema    Herniated lumbar intervertebral disc    Overview     Would like to proceed with an anesthesia consult later in pregnancy            Other    Myopia    Generalized anxiety disorder    Overview     Maintained on Wellbutrin and Zoloft         Depression    Overview     Continue on Wellbutrin and Zoloft, follow-up with psychiatrist         16 weeks gestation of pregnancy    Overview     Overview:  Labs  Pap smear 2022 NILM and GC/CT both neg  Prenatal panel rev (non immune Hep B, Rubella)  MSAFP ordered, to be done between 16-18w, reminded patient 24, not sure if she wants to proceed with testing secondary to cost, told patient it is ordered if she chooses to proceed and explained recommended time frame for testing.  28w labs to be ordered  Vaccines:  Flu vaccine: received through work in 2023  Covid vaccine: s/p 2 doses  Tdap vaccine: offer at 28 w  RSV: offer at 32w  Contraception: deciding, considering IUD   to be taken until 36 weeks preeclampsia prophylaxis  Feeding plan: breast  Birth plan:  with epidural  Delivery consent: to be signed at 28w  Ultrasounds: Level 1 done 1/10/24 wnl         History of Kawasaki's disease    Overview     Recommended an echocardiogram this pregnancy  Ordered by M 1/10/24  Reminded patient to complete 24              Subjective: 23 y.o.  16w1d here for prenatal visit. She denies contractions. She denies leakage of fluid and vaginal bleeding. She has begun to feel flutters of fetal movement but nothing regularly.     Objective:  Pre- Vitals      Flowsheet Row Most Recent Value   Prenatal Assessment    Prenatal Vitals    Blood Pressure 118/80   Weight - Scale 92.3 kg (203 lb 6.4 oz)   Urine Albumin/Glucose    Dilation/Effacement/Station    Vaginal Drainage     Edema              Pregnancy Plan:  Pregnancy: Edmonds     Delivery Plans  Planned delivery method: Vaginal  Planned delivery location: [facility] L[facility]  Acceptable blood products: All     Post-Delivery Plans  Feeding intentions: Breast Milk and Non-human milk substitute      General: Well appearing, no distress  Respiratory: Unlabored breathing  Cardiovascular: Regular rate. FHR: 154bpm  Abdomen: Soft, gravid, nontender  Fundal Height: Appropriate for gestational age.  Extremities: Warm and well perfused.  Non tender.        D/w  Ep[employer]op[employer]      Janis Elder MD  OBGYN PGY-2  2/1/2024 3:29 PM

## 2024-02-01 ENCOUNTER — ROUTINE PRENATAL (OUTPATIENT)
Dept: OBGYN CLINIC | Facility: CLINIC | Age: 24
End: 2024-02-01

## 2024-02-01 ENCOUNTER — PATIENT OUTREACH (OUTPATIENT)
Dept: OBGYN CLINIC | Facility: CLINIC | Age: 24
End: 2024-02-01

## 2024-02-01 VITALS
DIASTOLIC BLOOD PRESSURE: 80 MMHG | HEIGHT: 61 IN | BODY MASS INDEX: 38.4 KG/M2 | RESPIRATION RATE: 18 BRPM | SYSTOLIC BLOOD PRESSURE: 118 MMHG | HEART RATE: 99 BPM | WEIGHT: 203.4 LBS

## 2024-02-01 DIAGNOSIS — Z3A.12 12 WEEKS GESTATION OF PREGNANCY: Primary | ICD-10-CM

## 2024-02-01 DIAGNOSIS — Z3A.16 16 WEEKS GESTATION OF PREGNANCY: Primary | ICD-10-CM

## 2024-02-01 PROCEDURE — PNV: Performed by: OBSTETRICS & GYNECOLOGY

## 2024-02-01 RX ORDER — METRONIDAZOLE 500 MG/1
TABLET ORAL
COMMUNITY
Start: 2024-01-22

## 2024-02-01 NOTE — PROGRESS NOTES
CHRIST CAVANAUGH met with Pt for follow up. Pt reported relationship issues are better. Pt present overwhelmed by issues with employer. Per pt she was authorized to get 30 days FMLA and has not been able to return to work for the past 2 months because they are asking to apply for new position. Pt is behind in her rent and PPL. Pt was advice to call [employer] and ask for Ontrack and budget plan. CHRIST CAVANAUGH assisted Pt to apply for [employer] program to subsidized her rent. Awaiting for their response.     Pt states her mood has been more stable thanks to her medication. CHRIST CAVANAUGH provided listening counseling and encouraged PT to call at any time needed.

## 2024-02-09 ENCOUNTER — APPOINTMENT (OUTPATIENT)
Dept: LAB | Facility: CLINIC | Age: 24
End: 2024-02-09
Payer: COMMERCIAL

## 2024-02-09 ENCOUNTER — NURSE TRIAGE (OUTPATIENT)
Dept: OTHER | Facility: OTHER | Age: 24
End: 2024-02-09

## 2024-02-09 ENCOUNTER — ROUTINE PRENATAL (OUTPATIENT)
Dept: OBGYN CLINIC | Facility: CLINIC | Age: 24
End: 2024-02-09

## 2024-02-09 VITALS
SYSTOLIC BLOOD PRESSURE: 146 MMHG | DIASTOLIC BLOOD PRESSURE: 69 MMHG | BODY MASS INDEX: 38.29 KG/M2 | WEIGHT: 202.8 LBS | HEART RATE: 79 BPM | HEIGHT: 61 IN

## 2024-02-09 DIAGNOSIS — O16.2 ELEVATED BLOOD PRESSURE AFFECTING PREGNANCY IN SECOND TRIMESTER, ANTEPARTUM: ICD-10-CM

## 2024-02-09 DIAGNOSIS — O23.592 VAGINITIS DURING PREGNANCY IN SECOND TRIMESTER: ICD-10-CM

## 2024-02-09 DIAGNOSIS — Z3A.17 17 WEEKS GESTATION OF PREGNANCY: Primary | ICD-10-CM

## 2024-02-09 DIAGNOSIS — N76.0 VAGINITIS DURING PREGNANCY IN SECOND TRIMESTER: ICD-10-CM

## 2024-02-09 LAB
ALBUMIN SERPL BCP-MCNC: 3.6 G/DL (ref 3.5–5)
ALP SERPL-CCNC: 117 U/L (ref 34–104)
ALT SERPL W P-5'-P-CCNC: 43 U/L (ref 7–52)
ANION GAP SERPL CALCULATED.3IONS-SCNC: 8 MMOL/L
AST SERPL W P-5'-P-CCNC: 24 U/L (ref 13–39)
BILIRUB SERPL-MCNC: 0.25 MG/DL (ref 0.2–1)
BUN SERPL-MCNC: 8 MG/DL (ref 5–25)
BV WHIFF TEST VAG QL: NEGATIVE
CALCIUM SERPL-MCNC: 9.3 MG/DL (ref 8.4–10.2)
CHLORIDE SERPL-SCNC: 105 MMOL/L (ref 96–108)
CLUE CELLS SPEC QL WET PREP: NEGATIVE
CO2 SERPL-SCNC: 24 MMOL/L (ref 21–32)
CREAT SERPL-MCNC: 0.44 MG/DL (ref 0.6–1.3)
CREAT UR-MCNC: 136.2 MG/DL
ERYTHROCYTE [DISTWIDTH] IN BLOOD BY AUTOMATED COUNT: 15.4 % (ref 11.6–15.1)
GFR SERPL CREATININE-BSD FRML MDRD: 143 ML/MIN/1.73SQ M
GLUCOSE SERPL-MCNC: 112 MG/DL (ref 65–140)
HCT VFR BLD AUTO: 36.2 % (ref 34.8–46.1)
HGB BLD-MCNC: 11.5 G/DL (ref 11.5–15.4)
MCH RBC QN AUTO: 26.6 PG (ref 26.8–34.3)
MCHC RBC AUTO-ENTMCNC: 31.8 G/DL (ref 31.4–37.4)
MCV RBC AUTO: 84 FL (ref 82–98)
PH SMN: 3.5 [PH]
PLATELET # BLD AUTO: 235 THOUSANDS/UL (ref 149–390)
PMV BLD AUTO: 12.2 FL (ref 8.9–12.7)
POTASSIUM SERPL-SCNC: 3.8 MMOL/L (ref 3.5–5.3)
PROT SERPL-MCNC: 6.4 G/DL (ref 6.4–8.4)
PROT UR-MCNC: 13 MG/DL
PROT/CREAT UR: 0.1 MG/G{CREAT} (ref 0–0.1)
RBC # BLD AUTO: 4.32 MILLION/UL (ref 3.81–5.12)
SL AMB POCT WET MOUNT: ABNORMAL
SODIUM SERPL-SCNC: 137 MMOL/L (ref 135–147)
T VAGINALIS VAG QL WET PREP: NEGATIVE
WBC # BLD AUTO: 8.49 THOUSAND/UL (ref 4.31–10.16)
YEAST VAG QL WET PREP: POSITIVE

## 2024-02-09 PROCEDURE — 99213 OFFICE O/P EST LOW 20 MIN: CPT | Performed by: NURSE PRACTITIONER

## 2024-02-09 PROCEDURE — 36415 COLL VENOUS BLD VENIPUNCTURE: CPT

## 2024-02-09 PROCEDURE — 85027 COMPLETE CBC AUTOMATED: CPT

## 2024-02-09 PROCEDURE — 84156 ASSAY OF PROTEIN URINE: CPT

## 2024-02-09 PROCEDURE — 80053 COMPREHEN METABOLIC PANEL: CPT

## 2024-02-09 PROCEDURE — 82570 ASSAY OF URINE CREATININE: CPT

## 2024-02-09 PROCEDURE — 87210 SMEAR WET MOUNT SALINE/INK: CPT | Performed by: NURSE PRACTITIONER

## 2024-02-09 NOTE — PROGRESS NOTES
Vision Greg Sykes presents today for an OB problem visit at 17w5d. She reports a suspected yeast infection, having thick clumpy discharge and irritation. She has a history of this in the past. Denies any odor. No vaginal bleeding or pelvic pain. Reports feeling fetal movement.     Blood Pressure: 146/69  Wt=92 kg (202 lb 12.8 oz); Body mass index is 38.95 kg/m².; TWG=0.363 kg (12.8 oz)  Fetal Heart Rate: 158;    Abdomen: gravid, soft, non-tender.   She reports vaginal itching and discharge. Speculum exam reveals clumpy white vaginal discharge. Wet mount consistent with yeast.     Orders placed for Monistat. Reviewed vulvar skin care measures.   BP slightly elevated today. Baseline preeclampsia labs ordered. Warning signs reviewed.   She reports a mild HA over the past few days, she contributes this to having very little hydration/food intake over the past few days due to school schedule. Encourage to increase PO hydration today, eat a nutritious meal and take PO tylenol. If HA is not improved she will call the office.   Scheduled for ultrasound 2/28/24.  Reviewed common discomforts of pregnancy in second trimester and warning signs.  Advised to continue medications and return in 3 weeks for routine OC care or sooner if needed.      Current Outpatient Medications   Medication Instructions    aspirin 162 mg, Oral, Daily    buPROPion (WELLBUTRIN XL) 150 mg, Oral, Daily after breakfast    doxylamine (UNISOM) 12.5 mg, Oral, Daily at bedtime PRN    hydrOXYzine HCL (ATARAX) 25 mg, Oral, Every 6 hours PRN    metroNIDAZOLE (FLAGYL) 500 mg tablet TAKE 1 TABLET BY MOUTH EVERY 12 HOURS FOR 7 DAYS    prenatal vitamin (CLASSIC FORMULA) 27-0.8 mg 1 tablet, Oral, Every morning before breakfast    propranolol (INDERAL) 10 mg, Oral, 3 times daily PRN    pyridoxine (B-6) 25 mg, Oral, Daily    sertraline (ZOLOFT) 100 mg, Oral, Daily         Pregnancy Problems (from 12/19/23 to present)       Problem Noted Resolved    17 weeks  gestation of pregnancy 2024 by Dagmar Almazan DO No    Overview Addendum 2024  3:29 PM by Janis Elder MD     Overview:  Labs  Pap smear 2022 NILM and GC/CT both neg  Prenatal panel rev (non immune Hep B, Rubella)  MSAFP ordered, to be done between 16-18w, reminded patient 24, not sure if she wants to proceed with testing secondary to cost, told patient it is ordered if she chooses to proceed and explained recommended time frame for testing.  28w labs to be ordered  Vaccines:  Flu vaccine: received through work in 2023  Covid vaccine: s/p 2 doses  Tdap vaccine: offer at 28 w  RSV: offer at 32w  Contraception: deciding, considering IUD   to be taken until 36 weeks preeclampsia prophylaxis  Feeding plan: breast  Birth plan:  with epidural  Delivery consent: to be signed at 28w  Ultrasounds: Level 1 done 1/10/24 wnl         History of Kawasaki's disease 2024 by Dagmar Almazan DO No    Overview Addendum 2024  3:21 PM by Janis Elder MD     Recommended an echocardiogram this pregnancy  Ordered by [facility] 1/10/24  Reminded patient to complete 24

## 2024-02-10 NOTE — TELEPHONE ENCOUNTER
Regardin weeks pregnant / Headache / ear is ringing/nauseous  ----- Message from Dai Zavaleta sent at 2024  7:08 PM EST -----  I am 18 weeks pregnant. I have a headache that is not going away and my ears are ringing and I feel nauseous

## 2024-02-10 NOTE — TELEPHONE ENCOUNTER
"Reason for Disposition  • [1] Headache AND [2] has not taken pain medications  • All other adults with tinnitus (Exception: mild tinnitus in both ears, heard only in a quiet room)    Answer Assessment - Initial Assessment Questions  1. LOCATION: \"Where does it hurt?\"      R ear , and forehead  2. ONSET: \"When did the headache start?\" (Minutes, hours or days)       2/8 at HS (did not take any medication for pain)  3. PATTERN: \"Does the pain come and go, or has it been constant since it started?\"     Constant   4. SEVERITY: \"How bad is the pain?\" and \"What does it keep you from doing?\"     - MILD - doesn't interfere with normal activities     - MODERATE - interferes with normal activities or awakens from sleep     - SEVERE - excruciating pain, unable to do any normal activities         6/10  5. RECURRENT SYMPTOM: \"Have you ever had headaches before?\" If Yes, ask: \"When was the last time?\" and \"What happened that time?\"       Denies   6. CAUSE: \"What do you think is causing the headache?\"      Unsure   7. MIGRAINE: \"Have you been diagnosed with migraine headaches?\" If Yes, ask: \"Is this headache similar?\"       Denies   8. HEAD INJURY: \"Has there been any recent injury to the head?\"       Denies   9. OTHER SYMPTOMS: \"Do you have any other symptoms?\" (e.g., fever, stiff neck, blurred vision; swelling of hands, face, or feet)  Nausea , ringing in the ears   10. PREGNANCY: \"How many weeks pregnant are you?\"      17W5D  11. BRENNA: \"What date are you expecting to deliver?\"        7/14    Protocols used: Pregnancy - Headache-ADULT-AH, Tinnitus-ADULT-AH    "

## 2024-02-10 NOTE — TELEPHONE ENCOUNTER
Gestation:17W5D  BRENNA:      Headache since  at . Have not attempted tylenol, only water. Reports mild nausea, ringing in ears. No additional symptoms. Care advice given. Informed to call back if worsening/developing symptoms. Verbalized understanding. Agreeable with disposition. No further questions.

## 2024-02-26 ENCOUNTER — TELEPHONE (OUTPATIENT)
Age: 24
End: 2024-02-26

## 2024-02-26 NOTE — TELEPHONE ENCOUNTER
Patient needs to reschedule her appointment because of clinicals in school. Please call if something opens. Her insurance cancels 2/28/24. Can come to an appointment Tue or Thu after 11 am. Wed after 2:30 pm. She is keeping her appointment to see if her school will allow her to take off. Would prefer another time.

## 2024-02-27 ENCOUNTER — ROUTINE PRENATAL (OUTPATIENT)
Age: 24
End: 2024-02-27
Payer: COMMERCIAL

## 2024-02-27 VITALS
WEIGHT: 204.4 LBS | DIASTOLIC BLOOD PRESSURE: 84 MMHG | HEART RATE: 102 BPM | HEIGHT: 61 IN | BODY MASS INDEX: 38.59 KG/M2 | SYSTOLIC BLOOD PRESSURE: 136 MMHG

## 2024-02-27 DIAGNOSIS — Z3A.20 20 WEEKS GESTATION OF PREGNANCY: Primary | ICD-10-CM

## 2024-02-27 DIAGNOSIS — Z87.39 HISTORY OF KAWASAKI'S DISEASE: ICD-10-CM

## 2024-02-27 PROCEDURE — 76811 OB US DETAILED SNGL FETUS: CPT | Performed by: OBSTETRICS & GYNECOLOGY

## 2024-02-27 PROCEDURE — 76817 TRANSVAGINAL US OBSTETRIC: CPT | Performed by: OBSTETRICS & GYNECOLOGY

## 2024-02-27 PROCEDURE — 99213 OFFICE O/P EST LOW 20 MIN: CPT | Performed by: OBSTETRICS & GYNECOLOGY

## 2024-02-27 NOTE — PROGRESS NOTES
Ultrasound Probe Disinfection    A transvaginal ultrasound was performed.   Prior to use, disinfection was performed with High Level Disinfection Process (Nykaaon).  Probe serial number S2: [id number] was used.      Thi Gale  02/27/24  2:10 PM

## 2024-02-27 NOTE — PROGRESS NOTES
A fetal ultrasound was completed. See Ob procedures in Epic for an interpretation and recommendations. Do not hesitate to contact us in [facility] if you have questions.    Chan Fonseca MD, MSCE  Maternal Fetal Medicine

## 2024-02-27 NOTE — LETTER
February 27, 2024     Farheen Laws MD  [address]  [address]  [facility] [address]    Patient: Jules Sykes   YOB: 2000   Date of Visit: 2/27/2024       Dear Dr. Laws:    Thank you for referring Jules Sykes to me for evaluation. Below are my notes for this consultation.    If you have questions, please do not hesitate to call me. I look forward to following your patient along with you.         Sincerely,        Chna Fonseca MD        CC: No Recipients    Chan Fonseca MD  2/27/2024  2:38 PM  Sign when Signing Visit  A fetal ultrasound was completed. See Ob procedures in Epic for an interpretation and recommendations. Do not hesitate to contact us in [facility] if you have questions.    Chan Fonseca MD, MSCE  Maternal Fetal Medicine

## 2024-02-28 NOTE — PROGRESS NOTES
[facility] WOMEN'S HEALTH   PRENATAL VISIT  Jules Sykes  [id number]  2000        ASSESSMENT/PLAN:  Problem List          Musculoskeletal and Integument    Flexural eczema    Herniated lumbar intervertebral disc    Overview     Would like to proceed with an anesthesia consult later in pregnancy            Other    Myopia    Generalized anxiety disorder    Overview     Maintained on Wellbutrin and Zoloft         Depression    Overview     Continue on Wellbutrin and Zoloft, follow-up with psychiatrist         20 weeks gestation of pregnancy    Overview     Overview:  Labs  Pap smear 2022 NILM and GC/CT both neg  Prenatal panel rev (non immune Hep B, Rubella)  MSAFP ordered but not completed  28w labs to be ordered at 24w visit   Vaccines:  Flu vaccine: received through work in 2023  Covid vaccine: s/p 2 doses  Tdap vaccine: offer at 28 w  Contraception: deciding, considering IUD   to be taken until 36 weeks for preeclampsia prophylaxis  Feeding plan: breast  Birth plan:  with epidural  Delivery consent: to be signed at 28w  Ultrasounds:   Level 1 done 1/10/24 wnl  Level 2 23 wnl, RTO in 4 weeks for interval growth           History of Kawasaki's disease    Overview     Recommended an echocardiogram this pregnancy  Ordered by [facility] 1/10/24  Reminded patient to complete 24         Elevated blood pressure affecting pregnancy in second trimester, antepartum    Overview     24: /69 - Baseline PreE labs wnl, P:C 0.10              Subjective: 23 y.o.  20w3d here for prenatal visit. She denies regular contractions. She denies leakage of fluid and vaginal bleeding. She  has begun to feel fetal movement. Patient notes that a few weeks ago she did have spotting after intercourse but it slowed over time and she has not has any since.     Patient was told at her last [facility] appointment that she needs a follow-up anatomy scan in 4 weeks and is concerned that she is losing her  insurance soon and will not be able to afford it. We discussed that if this does happen, she should call our clinic and ask to speak with our  Tarah. Patient does plan to get her AFP done today.     Objective:  Pre-Светлана Vitals      Flowsheet Row Most Recent Value   Prenatal Assessment    Prenatal Vitals    Blood Pressure 116/77   Weight - Scale 92.1 kg (203 lb)   Urine Albumin/Glucose    Dilation/Effacement/Station    Vaginal Drainage    Edema              Pregnancy Plan:  Pregnancy: Edmonds     Delivery Plans  Planned delivery method: Vaginal  Planned delivery location: [facility] L[facility]D  Acceptable blood products: All     Post-Delivery Plans  Feeding intentions: Breast Milk and Non-human milk substitute      General: Well appearing, no distress  Respiratory: Unlabored breathing  Cardiovascular: Regular rate.  bpm  Abdomen: Soft, gravid, nontender  Fundal Height: Appropriate for gestational age. 20 cm  Extremities: Warm and well perfused.  Non tender.        D/w  [employer]       Janis Elder MD  OBGYN PGY-2  2024 3:22 PM

## 2024-02-29 ENCOUNTER — ROUTINE PRENATAL (OUTPATIENT)
Dept: OBGYN CLINIC | Facility: CLINIC | Age: 24
End: 2024-02-29

## 2024-02-29 VITALS
DIASTOLIC BLOOD PRESSURE: 77 MMHG | WEIGHT: 203 LBS | SYSTOLIC BLOOD PRESSURE: 116 MMHG | HEIGHT: 61 IN | BODY MASS INDEX: 38.33 KG/M2 | HEART RATE: 90 BPM | RESPIRATION RATE: 18 BRPM

## 2024-02-29 DIAGNOSIS — Z3A.20 20 WEEKS GESTATION OF PREGNANCY: Primary | ICD-10-CM

## 2024-02-29 PROCEDURE — PNV: Performed by: OBSTETRICS & GYNECOLOGY

## 2024-03-01 ENCOUNTER — NURSE TRIAGE (OUTPATIENT)
Dept: OTHER | Facility: OTHER | Age: 24
End: 2024-03-01

## 2024-03-01 ENCOUNTER — HOSPITAL ENCOUNTER (OUTPATIENT)
Facility: HOSPITAL | Age: 24
Discharge: HOME | End: 2024-03-01
Attending: STUDENT IN AN ORGANIZED HEALTH CARE EDUCATION/TRAINING PROGRAM | Admitting: STUDENT IN AN ORGANIZED HEALTH CARE EDUCATION/TRAINING PROGRAM
Payer: COMMERCIAL

## 2024-03-01 VITALS — HEART RATE: 71 BPM | RESPIRATION RATE: 18 BRPM | SYSTOLIC BLOOD PRESSURE: 112 MMHG | DIASTOLIC BLOOD PRESSURE: 59 MMHG

## 2024-03-01 PROBLEM — B37.9 YEAST INFECTION: Status: ACTIVE | Noted: 2024-03-01

## 2024-03-01 PROBLEM — O99.891 BACK PAIN AFFECTING PREGNANCY IN SECOND TRIMESTER: Status: ACTIVE | Noted: 2024-03-01

## 2024-03-01 PROBLEM — M54.9 BACK PAIN AFFECTING PREGNANCY IN SECOND TRIMESTER: Status: ACTIVE | Noted: 2024-03-01

## 2024-03-01 PROCEDURE — 76817 TRANSVAGINAL US OBSTETRIC: CPT

## 2024-03-01 PROCEDURE — 76815 OB US LIMITED FETUS(S): CPT

## 2024-03-01 PROCEDURE — 99212 OFFICE O/P EST SF 10 MIN: CPT

## 2024-03-01 NOTE — TELEPHONE ENCOUNTER
"Regardin weeks / pain  ----- Message from Marilyn Copeland sent at 3/1/2024  4:44 PM EST -----  \"I am 20 weeks and I feel an increasing tightness in my stomach area that wraps around to my back.\"    "

## 2024-03-01 NOTE — TELEPHONE ENCOUNTER
"Reason for Disposition  • MODERATE-SEVERE abdominal pain (e.g., interferes with normal activities, awakens from sleep)    Answer Assessment - Initial Assessment Questions  1. LOCATION: \"Where does it hurt?\"       Lower abdomen     2. RADIATION: \"Does the pain shoot anywhere else?\" (e.g., chest, back)      Hips and back     3. ONSET: \"When did the pain begin?\" (Minutes, hours or days ago)       Earlier today     4. ONSET: \"Gradual or sudden onset?\"      Gradual     5. PATTERN: \"Does the pain come and go, or has it been constant since it started?\"       Constant    6. SEVERITY: \"How bad is the pain?\" \"What does it keep you from doing?\"  (e.g., Scale 1-10; mild, moderate, or severe)    - MILD (1-3): doesn't interfere with normal activities, abdomen soft and not tender to touch     - MODERATE (4-7): interferes with normal activities or awakens from sleep, tender to touch     - SEVERE (8-10): excruciating pain, doubled over, unable to do any normal activities      5-6/10    7. RECURRENT SYMPTOM: \"Have you ever had this type of stomach pain before?\" If Yes, ask: \"When was the last time?\" and \"What happened that time?\"       Denies    8. CAUSE: \"What do you think is causing the stomach pain?      Unknown     9. RELIEVING/AGGRAVATING FACTORS: \"What makes it better or worse?\" (e.g., antacids, bowel movement, movement)      Laying down makes the pain better and getting up and walking makes the pain tighter    10. FETAL MOVEMENT: \"Has the baby's movement decreased or changed significantly from normal?\"        Hasn't felt the baby move since 1200    11. OTHER SYMPTOMS: \"Has there been any vaginal bleeding, fever, vomiting, diarrhea, or urine problems?\"        Denies    12. BRENNA: \"What date are you expecting to deliver?\"        7/14/23    Protocols used: Pregnancy - Abdominal Pain Greater Than 20 Weeks EGA-ADULT-AH    "

## 2024-03-02 NOTE — PROGRESS NOTES
L&D Triage Note - OB/GYN  Jules Sykes 23 y.o. female MRN: [id number]  Unit/Bed#: LD TRIAGE 2 Encounter: [id number]    Patient is seen by [facility]    ASSESSMENT/PLAN  Jules Sykes is a 23 y.o.  at 20w5d presenting for evaluation of round ligament and back pain.   labor workup negative other than yeast infection.  Plan for Monistat 7-day treatment course.  After discussion with patient, patient desires to receive over-the-counter tonight rather than waiting for prescription tomorrow.  Patient is stable for discharge home      1) Round ligament pain  - worse with sitting up right and standing, on both sides of lower abdomen and wrapping around to bilateral SI joints  - no CVA tenderness  - Speculum exam: Normal-appearing external female genitalia without lesion, normal-appearing vagina and cervix without lesion, mild amount of white mucousy discharge present within vagina.  Cervix visually closed.    LEEANNA/Wet [place]:     Infection:   - neg clue cells    - positive hyphae   - neg trichomonads present    Membrane status   - neg ferning   - neg nitrazene   - neg pooling     SVE:  Cervical Dilation: Closed  Cervical Effacement: 0  Cervical Consistency: Firm  Fetal Station: Floating  Method: Manual  OB Examiner: Veto    FHT:  130s bpm    TOCO:   No activity     IMAGING:       TVUS   Cervical length         - 3.49cm         - 3.5cm         - 3.6cm        TAUS   DVP 4.9 cm   Placenta: posterior    2)  Discharge instructions  - Patient instructed to call if experiencing worsening contractions, vaginal bleeding, loss of fluid or decreased fetal movement.  - Will follow up with OBGYN in office    D/w Dr. Doty  ______________    SUBJECTIVE    BRENNA: Estimated Date of Delivery: 24    HPI:  23 y.o.  20w5d presents with complaint of lower abdominal pain and back pain.  She has a history of a herniated disc.  She denies any regularity to her pain.  She describes  "it as sore and achy.  She denies any recent dysuria, fevers, chills, trauma.  Pain is worse when she stands or sits up.  It is better when she lays down.  She has not tried Tylenol or belly band support.    Contractions: denies  Leakage of fluid: denies  Vaginal Bleeding: denies  Fetal movement: present    Her obstetrical history is significant for elevated BP without a dx and history of kawasaki's disease    ROS:  Constitutional: Negative  Respiratory: Negative  Cardiovascular: Negative    Gastrointestinal: Negative    Physical Exam  GEN: Well appearing, no apparent distress   ABD: Gravid, soft, non-tender, non-distended  : no CVA tenderness, no suprapubic tenderness  SVE: Cervical Dilation: Closed  Cervical Effacement: 0  Cervical Consistency: Firm  Fetal Station: Floating  Method: Manual  OB Examiner: Veto    OBJECTIVE:  /59 (BP Location: Right arm)   Pulse 71   Resp 18   LMP 10/08/2023 (Approximate)   There is no height or weight on file to calculate BMI.  Labs: No results found for this or any previous visit (from the past 24 hour(s)).      Farheen Laws MD  OB/GYN PGY-1  3/2/2024  1:30 AM      Portions of the record may have been created with voice recognition software.  Occasional wrong word or \"sound a like\" substitutions may have occurred due to the inherent limitations of voice recognition software.  Read the chart carefully and recognize, using context, where substitutions have occurred   "

## 2024-03-02 NOTE — PROCEDURES
Vision Greg Sykes, brian  at 20w5d with an BRENNA of 2024, by Last Menstrual Period, was seen at [facility] for the following procedure(s): $Procedure Type: DALJIT, US - abdominal, US - Transvaginal]    Nonstress Test  Reason for NST: Other (Comment) (rule out  labor)    4 Quadrant DALJIT  LVP (cm): 5 cm         Ultrasound Other  Fetal Presentation: Vertex  Cervical Length: 3.49  Funnel: No  Debris: No  Placenta Location: Posterior  Placenta Previa: No  Vasa Previa: No    Interpretation  Nonstress Test Interpretation: Reactive  Overall Impression: Reassuring              Ultrasound Probe Disinfection    A transvaginal ultrasound was performed.   Prior to use, disinfection was performed with High Level Disinfection Process (Trophon)  Probe serial number SCHB:  [id number] was used    Farheen Laws MD  24  7:41 PM

## 2024-03-02 NOTE — DISCHARGE INSTR - AVS FIRST PAGE
As discussed, please proceed to a [facility] or any open pharmacy and  a 7-day course of Monistat, either the suppositories or vaginal cream.  Insert into the vagina every night before bed for 7 days.  Avoid sexual intercourse until 1 week after the completion of treatment.  Please call or return if you experience any vaginal bleeding, gush of fluid, painful contractions.  As discussed, vaginal spotting is normal after today's examination.

## 2024-03-07 ENCOUNTER — PATIENT OUTREACH (OUTPATIENT)
Dept: OBGYN CLINIC | Facility: CLINIC | Age: 24
End: 2024-03-07

## 2024-03-07 DIAGNOSIS — Z3A.20 20 WEEKS GESTATION OF PREGNANCY: Primary | ICD-10-CM

## 2024-03-07 NOTE — PROGRESS NOTES
CHRIST CAVANAUGH spoke with Pt for follow up. Pt reported she is still dealing with the housing issues. Pt is hoping that with the income tax she could pay it. CHRIST CAVANAUGH reminded Pt about resource in the community and pt will call if needed. 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 CHRIST CAVANAUGH provided brief supportive counseling. Pt will call as needed.

## 2024-03-24 ENCOUNTER — HOSPITAL ENCOUNTER (OUTPATIENT)
Facility: HOSPITAL | Age: 24
Discharge: HOME | End: 2024-03-24
Attending: OBSTETRICS & GYNECOLOGY | Admitting: OBSTETRICS & GYNECOLOGY
Payer: COMMERCIAL

## 2024-03-24 ENCOUNTER — NURSE TRIAGE (OUTPATIENT)
Dept: OTHER | Facility: OTHER | Age: 24
End: 2024-03-24

## 2024-03-24 VITALS
HEIGHT: 61 IN | SYSTOLIC BLOOD PRESSURE: 126 MMHG | BODY MASS INDEX: 38.51 KG/M2 | DIASTOLIC BLOOD PRESSURE: 69 MMHG | WEIGHT: 204 LBS | TEMPERATURE: 98.3 F | HEART RATE: 81 BPM | RESPIRATION RATE: 20 BRPM | OXYGEN SATURATION: 100 %

## 2024-03-24 DIAGNOSIS — N39.0 URINARY TRACT INFECTION: Primary | ICD-10-CM

## 2024-03-24 PROBLEM — R10.9 ABDOMINAL PAIN: Status: ACTIVE | Noted: 2024-03-24

## 2024-03-24 LAB
BACTERIA UR QL AUTO: ABNORMAL /HPF
BILIRUB UR QL STRIP: NEGATIVE
CLARITY UR: ABNORMAL
COLOR UR: ABNORMAL
GLUCOSE UR STRIP-MCNC: NEGATIVE MG/DL
HGB UR QL STRIP.AUTO: ABNORMAL
KETONES UR STRIP-MCNC: NEGATIVE MG/DL
LEUKOCYTE ESTERASE UR QL STRIP: NEGATIVE
MUCOUS THREADS UR QL AUTO: ABNORMAL
NITRITE UR QL STRIP: NEGATIVE
NON-SQ EPI CELLS URNS QL MICRO: ABNORMAL /HPF
PH UR STRIP.AUTO: 6.5 [PH]
PROT UR STRIP-MCNC: ABNORMAL MG/DL
RBC #/AREA URNS AUTO: ABNORMAL /HPF
SP GR UR STRIP.AUTO: 1.03 (ref 1–1.03)
UROBILINOGEN UR STRIP-ACNC: <2 MG/DL
WBC #/AREA URNS AUTO: ABNORMAL /HPF

## 2024-03-24 PROCEDURE — 99214 OFFICE O/P EST MOD 30 MIN: CPT

## 2024-03-24 PROCEDURE — 81001 URINALYSIS AUTO W/SCOPE: CPT

## 2024-03-24 PROCEDURE — 87086 URINE CULTURE/COLONY COUNT: CPT

## 2024-03-24 PROCEDURE — 76817 TRANSVAGINAL US OBSTETRIC: CPT

## 2024-03-24 RX ORDER — CEPHALEXIN 500 MG/1
500 CAPSULE ORAL EVERY 12 HOURS SCHEDULED
Qty: 14 CAPSULE | Refills: 0 | Status: SHIPPED | OUTPATIENT
Start: 2024-03-24 | End: 2024-03-31

## 2024-03-24 RX ORDER — ACETAMINOPHEN 10 MG/ML
1000 INJECTION, SOLUTION INTRAVENOUS ONCE
Status: COMPLETED | OUTPATIENT
Start: 2024-03-24 | End: 2024-03-24

## 2024-03-24 RX ADMIN — SODIUM CHLORIDE, SODIUM LACTATE, POTASSIUM CHLORIDE, AND CALCIUM CHLORIDE 1000 ML: .6; .31; .03; .02 INJECTION, SOLUTION INTRAVENOUS at 15:17

## 2024-03-24 RX ADMIN — CEFTRIAXONE SODIUM 1000 MG: 10 INJECTION, POWDER, FOR SOLUTION INTRAVENOUS at 15:18

## 2024-03-24 RX ADMIN — ACETAMINOPHEN 1000 MG: 10 INJECTION INTRAVENOUS at 15:23

## 2024-03-24 NOTE — PROGRESS NOTES
L&D Triage Note - OB/GYN  Vision Greg Sykes 24 y.o. female MRN: [id number]  Unit/Bed#: LD TRIAGE  Encounter: [id number]      ASSESSMENT/PLAN  Vision Greg Sykes is a 24 y.o.  at 24w0d here for lower abdominal pain      1) R/O  labor  - SSE: closed cervix, microscopy normal  - Cervical length normal, see fetal testing note  - pain improved after IV tylenol, fluids, rocephin    2) Concern for UTI  -Patient presents with difficulty urinating, blood in her urine and frequent UTIs  - Follow up urine culture  - 1 dose rocephin given in triage  - Keflex 500 BID for 7 days sent to her pharmacy    She is a patient of [facility]  D/w Dr. Sims, on call OBGYN Attending Physician  ______________    SUBJECTIVE    BRENNA: Estimated Date of Delivery: 24    HPI:  24 y.o.  24w0d presents with complaint of lower abdominal pain that is suprapubic in nature, she reports that it is constant pain.  She reports she gets frequent UTIs and has noticed blood in her urine today.  She does not have any back pain and has never had a history of kidney stone.  She reports she is dehydrated today.     Contractions: no  Leakage of fluid: no  Vaginal Bleeding: no  Fetal movement: present    Her obstetrical history is significant for Depression, Anxiety    ROS:  Constitutional: Negative  Respiratory: Negative  Cardiovascular: Negative    Gastrointestinal: Negative    Physical Exam  General: Well appearing, no distress  Respiratory: Unlabored breathing  Cardiovascular: Regular rate  Abdomen: Soft, gravid, nontender   Speculum Exam:   KOH/Wet Mount:      Infection:   - no clue cells    - no hyphae   - no trichomonads present     Membrane status   - no ferning   - neg nitrazene   - no pooling   Fundal Height: Appropriate for gestational age.  Extremities: Warm and well perfused.  Non tender.      OBJECTIVE:  /69 (BP Location: Left arm)   Pulse 81   Temp 98.3 °F (36.8 °C) (Oral)   Resp 20  "  Ht 5' 1\" (1.549 m)   Wt 92.5 kg (204 lb)   LMP 10/08/2023 (Approximate)   SpO2 100%   BMI 38.55 kg/m²   Body mass index is 38.55 kg/m².  Labs:   Recent Results (from the past 24 hour(s))   UA w Reflex to Microscopic w Reflex to Culture    Collection Time: 03/24/24  1:51 PM    Specimen: Urine, Clean Catch   Result Value Ref Range    Color, UA Light Orange     Clarity, UA Turbid     Specific Gravity, UA 1.026 1.003 - 1.030    pH, UA 6.5 4.5, 5.0, 5.5, 6.0, 6.5, 7.0, 7.5, 8.0    Leukocytes, UA Negative Negative    Nitrite, UA Negative Negative    Protein, UA 30 (1+) (A) Negative mg/dl    Glucose, UA Negative Negative mg/dl    Ketones, UA Negative Negative mg/dl    Urobilinogen, UA <2.0 <2.0 mg/dl mg/dl    Bilirubin, UA Negative Negative    Occult Blood, UA Large (A) Negative   Urine Microscopic    Collection Time: 03/24/24  1:51 PM   Result Value Ref Range    RBC, UA Innumerable (A) None Seen, 1-2 /hpf    WBC, UA None Seen None Seen, 1-2 /hpf    Epithelial Cells Occasional None Seen, Occasional /hpf    Bacteria, UA None Seen None Seen, Occasional /hpf    MUCUS THREADS Occasional (A) None Seen         SVE:   Declined by patient     FHT:  Baseline Rate (FHR): 155 bpm  Variability: Moderate  Accelerations: 10 x 10 (<32 weeks), At variable times  Decelerations: None  FHR Category: Category I    TOCO:   Contraction Frequency (minutes): 0    IMAGING:       TVUS   Cervical length         - 4.0cm       Sonja Faust DO  OB/GYN   3/24/2024  4:59 PM      Portions of the record may have been created with voice recognition software.  Occasional wrong word or \"sound a like\" substitutions may have occurred due to the inherent limitations of voice recognition software.  Read the chart carefully and recognize, using context, where substitutions have occurred    "

## 2024-03-24 NOTE — TELEPHONE ENCOUNTER
"Reason for Disposition  • MODERATE-SEVERE abdominal pain (e.g., interferes with normal activities, awakens from sleep)    Answer Assessment - Initial Assessment Questions  1. LOCATION: \"Where does it hurt?\"       Right lower abdomen    2. RADIATION: \"Does the pain shoot anywhere else?\" (e.g., chest, back)      Right leg w/numbness and tingling    3. ONSET: \"When did the pain begin?\" (Minutes, hours or days ago)       8:30 AM this morning    4. ONSET: \"Gradual or sudden onset?\"      Sudden    5. PATTERN: \"Does the pain come and go, or has it been constant since it started?\"       Constant     6. SEVERITY: \"How bad is the pain?\" \"What does it keep you from doing?\"  (e.g., Scale 1-10; mild, moderate, or severe)    - MILD (1-3): doesn't interfere with normal activities, abdomen soft and not tender to touch     - MODERATE (4-7): interferes with normal activities or awakens from sleep, tender to touch     - SEVERE (8-10): excruciating pain, doubled over, unable to do any normal activities      Severe 8/10    7. RECURRENT SYMPTOM: \"Have you ever had this type of stomach pain before?\" If Yes, ask: \"When was the last time?\" and \"What happened that time?\"       Denies    8. CAUSE: \"What do you think is causing the stomach pain?      Unknown    9. RELIEVING/AGGRAVATING FACTORS: \"What makes it better or worse?\" (e.g., antacids, bowel movement, movement)      Lying down makes pain worse    10. OTHER SYMPTOMS: \"Has there been any vaginal bleeding, fever, vomiting, diarrhea, or urine problems?\"        Frequency urinating but nothing coming out, pelvic pressure    11. BRENNA: \"What date are you expecting to deliver?\"        07/14/2024    Answer Assessment - Initial Assessment Questions  1. LOCATION: \"Where does it hurt?\"       Right lower abdomen    2. RADIATION: \"Does the pain shoot anywhere else?\" (e.g., chest, back)      Right leg w/numbness and tingling     3. ONSET: \"When did the pain begin?\" (e.g., minutes, hours or days ago)    " "   8:30AM this morning    4. SUDDEN: \"Gradual or sudden onset?\"      Sudden    5. PATTERN \"Does the pain come and go, or is it constant?\"     - If constant: \"Is it getting better, staying the same, or worsening?\"       (Note: Constant means the pain never goes away completely; most serious pain is constant and it progresses)      - If intermittent: \"How long does it last?\" \"Do you have pain now?\"      (Note: Intermittent means the pain goes away completely between bouts)      Constant    6. SEVERITY: \"How bad is the pain?\"  (e.g., Scale 1-10; mild, moderate, or severe)    - MILD (1-3): doesn't interfere with normal activities, abdomen soft and not tender to touch     - MODERATE (4-7): interferes with normal activities or awakens from sleep, tender to touch     - SEVERE (8-10): excruciating pain, doubled over, unable to do any normal activities       Severe 8/10    7. RECURRENT SYMPTOM: \"Have you ever had this type of stomach pain before?\" If Yes, ask: \"When was the last time?\" and \"What happened that time?\"       Denies    8. CAUSE: \"What do you think is causing the stomach pain?\"      Unknown    9. RELIEVING/AGGRAVATING FACTORS: \"What makes it better or worse?\" (e.g., movement, antacids, bowel movement)      Laying down makes pain worse    10. OTHER SYMPTOMS: \"Has there been any vomiting, diarrhea, constipation, or urine problems?\"        Frequency urinating but nothing comes out, pelvic pressure    11. PREGNANCY: \"Is there any chance you are pregnant?\" \"When was your last menstrual period?\"        24w0d    Protocols used: Abdominal Pain - Female-ADULT-AH, Pregnancy - Abdominal Pain Greater Than 20 Weeks EGA-ADULT-AH    "

## 2024-03-24 NOTE — TELEPHONE ENCOUNTER
"Regardinw pregnant / stomach pain going down to right foot  ----- Message from Trinity Cruz sent at 3/24/2024  9:48 AM EDT -----  \" I am 24w pregnant and I am having pain on my right lower side of my stomach going down my leg to my foot. My whole leg and foot are numb. \"    "

## 2024-03-24 NOTE — PROCEDURES
Vision Greg Sykes, brian  at 24w0d with an BRENNA of 2024, by Last Menstrual Period, was seen at [facility] for the following procedure(s): $Procedure Type: US - Transvaginal]                   Ultrasound Other  Cervical Length: 4  Funnel: No  Debris: No  Vasa Previa: No         Ultrasound Probe Disinfection    A transvaginal ultrasound was performed.   Prior to use, disinfection was performed with High Level Disinfection Process (Trophon)      Sonja Faust DO  24  2:16 PM

## 2024-03-25 ENCOUNTER — PATIENT MESSAGE (OUTPATIENT)
Dept: OBGYN CLINIC | Facility: CLINIC | Age: 24
End: 2024-03-25

## 2024-03-26 LAB — BACTERIA UR CULT: NORMAL

## 2024-03-26 NOTE — QUICK NOTE
Called patient, discussed her urine culture from her recent visit at [facility].  Urine culture showed no growth, patient told now she could discontinue her Keflex.  Patient reports that she feels feels like she passed a kidney stone but still has minor abdominal discomfort. Discussed importance of proper hydration and tylenol. Patient has appointment in the clinic this week however if pain becomes significant patient will call her provider.    Sonja Faust, DO   OB/GYN PGY-1  03/26/24  9:03 AM

## 2024-03-28 NOTE — PROGRESS NOTES
Please refer to the [facility] ultrasound report in Ob Procedures for additional information regarding today's visit

## 2024-03-29 ENCOUNTER — ULTRASOUND (OUTPATIENT)
Facility: HOSPITAL | Age: 24
End: 2024-03-29

## 2024-03-29 VITALS
WEIGHT: 209 LBS | BODY MASS INDEX: 39.46 KG/M2 | HEART RATE: 88 BPM | DIASTOLIC BLOOD PRESSURE: 56 MMHG | HEIGHT: 61 IN | SYSTOLIC BLOOD PRESSURE: 106 MMHG

## 2024-03-29 DIAGNOSIS — F32.A DEPRESSION COMPLICATING PREGNANCY, ANTEPARTUM, SECOND TRIMESTER: ICD-10-CM

## 2024-03-29 DIAGNOSIS — Z3A.24 24 WEEKS GESTATION OF PREGNANCY: Primary | ICD-10-CM

## 2024-03-29 DIAGNOSIS — O99.212 MATERNAL OBESITY, ANTEPARTUM, SECOND TRIMESTER: ICD-10-CM

## 2024-03-29 DIAGNOSIS — O99.342 DEPRESSION COMPLICATING PREGNANCY, ANTEPARTUM, SECOND TRIMESTER: ICD-10-CM

## 2024-03-29 DIAGNOSIS — Z36.89 ENCOUNTER FOR FETAL ANATOMIC SURVEY: ICD-10-CM

## 2024-03-29 PROCEDURE — 76816 OB US FOLLOW-UP PER FETUS: CPT | Performed by: OBSTETRICS & GYNECOLOGY

## 2024-03-29 NOTE — LETTER
March 29, 2024     [facility] PROVIDER    Patient: Jules Sykes   YOB: 2000   Date of Visit: 3/29/2024       Dear  Provider:    Thank you for referring Jules Sykes to me for evaluation. Below are my notes for this consultation.    If you have questions, please do not hesitate to call me. I look forward to following your patient along with you.         Sincerely,        Saad Gonzales MD        CC: No Recipients    Saad Gonzales MD  3/28/2024 11:35 AM  Sign when Signing Visit  Please refer to the [facility] ultrasound report in Ob Procedures for additional information regarding today's visit

## 2024-04-13 DIAGNOSIS — F41.1 GAD (GENERALIZED ANXIETY DISORDER): ICD-10-CM

## 2024-04-15 RX ORDER — SERTRALINE HYDROCHLORIDE 100 MG/1
100 TABLET, FILM COATED ORAL DAILY
Qty: 30 TABLET | Refills: 1 | Status: SHIPPED | OUTPATIENT
Start: 2024-04-15 | End: 2024-04-22 | Stop reason: SDUPTHER

## 2024-04-17 ENCOUNTER — TELEPHONE (OUTPATIENT)
Dept: PSYCHIATRY | Facility: CLINIC | Age: 24
End: 2024-04-17

## 2024-04-17 NOTE — TELEPHONE ENCOUNTER
Patient has been added to the [facility] wait list without a referral.    Insurance: [facility]  Insurance Type:    Commercial [x]   Medicaid []   [facility] (if applicable)   Medicare []  Location Preference: [place]  Provider Preference: male or female  Virtual: Yes [x] No []  Were outside resources sent: Yes [] No [x]

## 2024-04-22 ENCOUNTER — TELEPHONE (OUTPATIENT)
Dept: OBGYN CLINIC | Facility: CLINIC | Age: 24
End: 2024-04-22

## 2024-04-22 ENCOUNTER — TELEMEDICINE (OUTPATIENT)
Dept: PSYCHIATRY | Facility: CLINIC | Age: 24
End: 2024-04-22
Payer: COMMERCIAL

## 2024-04-22 DIAGNOSIS — F41.1 GAD (GENERALIZED ANXIETY DISORDER): ICD-10-CM

## 2024-04-22 PROCEDURE — 99214 OFFICE O/P EST MOD 30 MIN: CPT | Performed by: STUDENT IN AN ORGANIZED HEALTH CARE EDUCATION/TRAINING PROGRAM

## 2024-04-22 RX ORDER — SERTRALINE HYDROCHLORIDE 100 MG/1
200 TABLET, FILM COATED ORAL DAILY
Qty: 90 TABLET | Refills: 0 | Status: SHIPPED | OUTPATIENT
Start: 2024-04-22 | End: 2024-05-01
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
## 2024-04-26 ENCOUNTER — APPOINTMENT (OUTPATIENT)
Dept: LAB | Age: 24
End: 2024-04-26

## 2024-04-26 DIAGNOSIS — Z3A.28 28 WEEKS GESTATION OF PREGNANCY: Primary | ICD-10-CM

## 2024-04-26 DIAGNOSIS — Z34.92 PRENATAL CARE IN SECOND TRIMESTER: ICD-10-CM

## 2024-04-26 DIAGNOSIS — O16.2 ELEVATED BLOOD PRESSURE AFFECTING PREGNANCY IN SECOND TRIMESTER, ANTEPARTUM: ICD-10-CM

## 2024-04-26 DIAGNOSIS — Z3A.28 28 WEEKS GESTATION OF PREGNANCY: ICD-10-CM

## 2024-04-26 PROBLEM — O09.32 INSUFFICIENT PRENATAL CARE IN SECOND TRIMESTER: Status: ACTIVE | Noted: 2024-04-26

## 2024-04-29 ENCOUNTER — APPOINTMENT (OUTPATIENT)
Dept: LAB | Age: 24
End: 2024-04-29
Payer: MEDICARE

## 2024-04-29 ENCOUNTER — TELEPHONE (OUTPATIENT)
Dept: OBGYN CLINIC | Facility: CLINIC | Age: 24
End: 2024-04-29

## 2024-04-29 ENCOUNTER — ROUTINE PRENATAL (OUTPATIENT)
Dept: OBGYN CLINIC | Facility: CLINIC | Age: 24
End: 2024-04-29

## 2024-04-29 VITALS
SYSTOLIC BLOOD PRESSURE: 121 MMHG | DIASTOLIC BLOOD PRESSURE: 75 MMHG | HEART RATE: 84 BPM | HEIGHT: 61 IN | WEIGHT: 222.2 LBS | BODY MASS INDEX: 41.95 KG/M2

## 2024-04-29 DIAGNOSIS — Z3A.29 29 WEEKS GESTATION OF PREGNANCY: ICD-10-CM

## 2024-04-29 DIAGNOSIS — Z23 NEED FOR TDAP VACCINATION: ICD-10-CM

## 2024-04-29 DIAGNOSIS — Z3A.12 12 WEEKS GESTATION OF PREGNANCY: ICD-10-CM

## 2024-04-29 DIAGNOSIS — Z34.93 PRENATAL CARE IN THIRD TRIMESTER: ICD-10-CM

## 2024-04-29 DIAGNOSIS — Z23 ENCOUNTER FOR IMMUNIZATION: Primary | ICD-10-CM

## 2024-04-29 LAB
ALBUMIN SERPL BCP-MCNC: 3.2 G/DL (ref 3.5–5)
ALP SERPL-CCNC: 135 U/L (ref 34–104)
ALT SERPL W P-5'-P-CCNC: 16 U/L (ref 7–52)
ANION GAP SERPL CALCULATED.3IONS-SCNC: 9 MMOL/L (ref 4–13)
AST SERPL W P-5'-P-CCNC: 16 U/L (ref 13–39)
BILIRUB SERPL-MCNC: 0.17 MG/DL (ref 0.2–1)
BUN SERPL-MCNC: 14 MG/DL (ref 5–25)
CALCIUM ALBUM COR SERPL-MCNC: 9.1 MG/DL (ref 8.3–10.1)
CALCIUM SERPL-MCNC: 8.5 MG/DL (ref 8.4–10.2)
CHLORIDE SERPL-SCNC: 106 MMOL/L (ref 96–108)
CO2 SERPL-SCNC: 25 MMOL/L (ref 21–32)
CREAT SERPL-MCNC: 0.7 MG/DL (ref 0.6–1.3)
CREAT UR-MCNC: 217.4 MG/DL
ERYTHROCYTE [DISTWIDTH] IN BLOOD BY AUTOMATED COUNT: 14.6 % (ref 11.6–15.1)
GFR SERPL CREATININE-BSD FRML MDRD: 122 ML/MIN/1.73SQ M
GLUCOSE 1H P 50 G GLC PO SERPL-MCNC: 102 MG/DL (ref 70–134)
GLUCOSE P FAST SERPL-MCNC: 103 MG/DL (ref 65–99)
HCT VFR BLD AUTO: 35.2 % (ref 34.8–46.1)
HGB BLD-MCNC: 11 G/DL (ref 11.5–15.4)
MCH RBC QN AUTO: 27.2 PG (ref 26.8–34.3)
MCHC RBC AUTO-ENTMCNC: 31.3 G/DL (ref 31.4–37.4)
MCV RBC AUTO: 87 FL (ref 82–98)
PLATELET # BLD AUTO: 238 THOUSANDS/UL (ref 149–390)
PMV BLD AUTO: 12.7 FL (ref 8.9–12.7)
POTASSIUM SERPL-SCNC: 4 MMOL/L (ref 3.5–5.3)
PROT SERPL-MCNC: 5.9 G/DL (ref 6.4–8.4)
PROT UR-MCNC: 28 MG/DL
PROT/CREAT UR: 0.13 MG/G{CREAT} (ref 0–0.1)
RBC # BLD AUTO: 4.04 MILLION/UL (ref 3.81–5.12)
SODIUM SERPL-SCNC: 140 MMOL/L (ref 135–147)
TREPONEMA PALLIDUM IGG+IGM AB [PRESENCE] IN SERUM OR PLASMA BY IMMUNOASSAY: NORMAL
WBC # BLD AUTO: 10.8 THOUSAND/UL (ref 4.31–10.16)

## 2024-04-29 PROCEDURE — 82950 GLUCOSE TEST: CPT

## 2024-04-29 PROCEDURE — 85027 COMPLETE CBC AUTOMATED: CPT

## 2024-04-29 PROCEDURE — 36415 COLL VENOUS BLD VENIPUNCTURE: CPT

## 2024-04-29 PROCEDURE — 86780 TREPONEMA PALLIDUM: CPT

## 2024-04-29 PROCEDURE — 80053 COMPREHEN METABOLIC PANEL: CPT

## 2024-04-29 PROCEDURE — 90471 IMMUNIZATION ADMIN: CPT | Performed by: NURSE PRACTITIONER

## 2024-04-29 PROCEDURE — 82105 ALPHA-FETOPROTEIN SERUM: CPT

## 2024-04-29 PROCEDURE — 90715 TDAP VACCINE 7 YRS/> IM: CPT | Performed by: NURSE PRACTITIONER

## 2024-04-29 PROCEDURE — 84156 ASSAY OF PROTEIN URINE: CPT

## 2024-04-29 PROCEDURE — 82570 ASSAY OF URINE CREATININE: CPT

## 2024-04-29 PROCEDURE — 99213 OFFICE O/P EST LOW 20 MIN: CPT | Performed by: NURSE PRACTITIONER

## 2024-04-29 RX ORDER — PRENATAL WITH FERROUS FUM AND FOLIC ACID 3080; 920; 120; 400; 22; 1.84; 3; 20; 10; 1; 12; 200; 27; 25; 2 [IU]/1; [IU]/1; MG/1; [IU]/1; MG/1; MG/1; MG/1; MG/1; MG/1; MG/1; UG/1; MG/1; MG/1; MG/1; MG/1
1 TABLET ORAL DAILY
Qty: 30 TABLET | Refills: 11 | Status: SHIPPED | OUTPATIENT
Start: 2024-04-29

## 2024-04-29 NOTE — PROGRESS NOTES
Vision Greg Sykes presents today for routine OB visit at 29w1d. She is a . She reports that recently she has been experiencing fatigue and has been waking up at night feeling sweaty. She denies any LOF, VB or contractions. She is reporting good fetal movement.     Blood Pressure: 121/75  Ws=789 kg (222 lb 3.2 oz); Body mass index is 41.98 kg/m².; TWG=9.163 kg (20 lb 3.2 oz)  Fetal Heart Rate: 145; Fundal Height (cm): 30 cm  Abdomen: gravid, soft, non-tender.  Scheduled for ultrasound 24.    Tdap vaccine given today.   Encouraged to complete lab work to assess for anemia and GDM.   Reviewed premature labor precautions and fetal kick counts.  Advised to continue medications and return in 2 weeks.      Current Outpatient Medications   Medication Instructions    Prenatal 27-1 MG TABS 1 tablet, Oral, Daily    prenatal vitamin (CLASSIC FORMULA) 27-0.8 mg 1 tablet, Oral, Every morning before breakfast    sertraline (ZOLOFT) 200 mg, Oral, Daily         Pregnancy Problems (from 23 to present)       Problem Noted Resolved    Insufficient prenatal care in second trimester 2024 by JAMIE Jacob No    Overview Signed 2024 10:14 AM by JAMIE Jacob     No routine OB care from 20w to 29w1d          Elevated blood pressure affecting pregnancy in second trimester, antepartum 2024 by JAMIE Jacob No    Overview Addendum 2024  1:05 PM by Janis Elder MD     24: /69 - Baseline PreE labs wnl, P:C 0.10         29 weeks gestation of pregnancy 2024 by Dagmar Almazan DO No    Overview Addendum 2024 11:21 AM by JAMIE Jacob     Overview:  Labs  Pap smear 2022 NILM and GC/CT both neg  Prenatal panel rev (non immune Hep B, Rubella)  MSAFP ordered but not completed  28w labs ordered 24  Vaccines:  Flu vaccine: received through work in 2023  Covid vaccine: s/p 2 doses  Tdap vaccine: given 24  Contraception: deciding,  considering IUD   to be taken until 36 weeks for preeclampsia prophylaxis  Feeding plan: breast  Birth plan:  with epidural  Delivery consent: to be signed at 28w  Ultrasounds:   Level 1 done 1/10/24 wnl  Level 2 23 wnl, RTO in 4 weeks for interval growth  3/29/2024 24w5d:Breech. Posterior placenta. EFW 48%. Return for growth scan at 32 weeks.            History of Kawasaki's disease 2024 by Dagmar Almazan,  No    Overview Addendum 2024  3:21 PM by Janis Elder MD     Recommended an echocardiogram this pregnancy  Ordered by [facility] 1/10/24  Reminded patient to complete 24

## 2024-04-29 NOTE — TELEPHONE ENCOUNTER
Pt called office today requesting a note that states she may see a chiropractor. Pt had requested this Friday, however forgot to follow up on status at her appointment today. Pt has a 2:00 appointment with chiropractor today.

## 2024-04-30 ENCOUNTER — HOSPITAL ENCOUNTER (EMERGENCY)
Facility: HOSPITAL | Age: 24
Discharge: HOME | End: 2024-04-30
Attending: EMERGENCY MEDICINE
Payer: MEDICARE

## 2024-04-30 VITALS
WEIGHT: 221.56 LBS | TEMPERATURE: 98 F | RESPIRATION RATE: 18 BRPM | BODY MASS INDEX: 41.86 KG/M2 | HEART RATE: 63 BPM | SYSTOLIC BLOOD PRESSURE: 124 MMHG | OXYGEN SATURATION: 98 % | DIASTOLIC BLOOD PRESSURE: 63 MMHG

## 2024-04-30 DIAGNOSIS — F32.A DEPRESSION: Primary | ICD-10-CM

## 2024-04-30 PROCEDURE — 99284 EMERGENCY DEPT VISIT MOD MDM: CPT

## 2024-04-30 PROCEDURE — 99283 EMERGENCY DEPT VISIT LOW MDM: CPT

## 2024-04-30 NOTE — ED NOTES
Patient presents to the Emergency Department via recommendation from the [facility] in [place], following an increase in anxiety. Patient is calm and cooperative upon approach, and agrees to speak with this writer. Patient is oriented across all spheres, has an even affect, speaks logically and coherently and is in an anxious/depressed mood. Patient reports an increase in anxiety, depression and brain fog following an increase in her Zoloft about a week ago. Patient reports her medications were increased when she reported her current dose was no longer as effective as it had once been. Patient reports she started 200mg of Zoloft on 4/22 and since then her anxiety and depression have been worse, and she has had intermittent, passive suicidal ideation without plan or intent. Patient reports this only has happened a few times, and it usually coincides with an increase in anxiety. Patient reports she feels safe and is able to maintain her own safety. Patient follows with Dr. Nguyen for psychiatry and medication management, but does not currently have a therapist. Patient is medication compliant. Patient denies current suicidal ideation, as well as homicidal ideation and auditory/visual/tactile hallucinations. Patient reports a decrease in appetite, and admits she knows she needs to make sure she is getting enough nutrients for herself and the baby. Patient does not report any major disruptions in sleep patterns. Patient denies alcohol, tobacco and drug consumption. Treatment options were discussed with patient, and she is in agreement with a referral to [facility], and would also like to receive resources for an outpatient therapist.    Santhosh Serrano  Crisis Intervention Specialist II  04/30/24

## 2024-04-30 NOTE — ED PROVIDER NOTES
"History  Chief Complaint   Patient presents with    Medication Problem     Patient is 29 weeks pregnant and reports recent change to zoloft and since has been experience feelings of doom, feeling  foggy, denies any SI, patient BP in triage 158/94, OB contacted and instructed to be seen in ED     24 YOF with PMH depression, anxiety,  approx 29 weeks presents today due to feeling \"foggy\" and \"detached\". Pt reports her psychiatrist increased her Zoloft from 100mg to 200 mg and she made that change on . Reports that for the past few days she feels increased depression and she has had intermittent, passive suicidal ideation without plan or intent. . Patient reports this only has happened a few times, and it usually coincides with an increase in anxiety. Patient reports she feels safe and is able to maintain her own safety. Patient follows with Dr. Nguyen for psychiatry and medication management, but does not currently have a therapist. Patient is medication compliant. Patient denies current suicidal ideation, as well as homicidal ideation and auditory/visual/tactile hallucinations.    Pt denies any LH, dizziness, chest pain, abd pain, vaginal bleeding or discharge. Everything has been okay with her pregnancy so far.         Prior to Admission Medications   Prescriptions Last Dose Informant Patient Reported? Taking?   Prenatal 27-1 MG TABS   No No   Sig: Take 1 tablet by mouth in the morning   prenatal vitamin (CLASSIC FORMULA) 27-0.8 mg  Self Yes No   Sig: Take 1 tablet by mouth every morning before breakfast   sertraline (ZOLOFT) 100 mg tablet   No No   Sig: Take 2 tablets (200 mg total) by mouth daily      Facility-Administered Medications: None       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 - has EKG done every 2 years    Memory changes 08/10/2018       Past Surgical History:   Procedure " Laterality Date    NO PAST SURGERIES         Family History   Problem Relation Age of Onset    No Known Problems Mother     No Known Problems Father     Hypertension Sister     No Known Problems Sister     No Known Problems Sister     No Known Problems Brother     No Known Problems Brother     No Known Problems Brother     Hypertension Maternal Grandmother     Breast cancer Paternal Grandmother     Diabetes Paternal Grandmother     Cancer Family         colon     I have reviewed and agree with the history as documented.    E-Cigarette/Vaping    E-Cigarette Use Former User      E-Cigarette/Vaping Substances    Nicotine Yes     THC No     CBD No     Flavoring No     Other No     Unknown No      Social History     Tobacco Use    Smoking status: Never    Smokeless tobacco: Never   Vaping Use    Vaping status: Former    Substances: Nicotine   Substance Use Topics    Alcohol use: Not Currently     Alcohol/week: 1.0 standard drink of alcohol     Types: 1 Standard drinks or equivalent per week     Comment: social    Drug use: Not Currently     Types: Marijuana     Comment: Last use 2022       Review of Systems   Gastrointestinal:  Negative for nausea and vomiting.   Genitourinary:  Negative for pelvic pain and vaginal bleeding.   All other systems reviewed and are negative.      Physical Exam  Physical Exam  Vitals and nursing note reviewed.   Constitutional:       General: She is not in acute distress.     Appearance: Normal appearance. She is well-developed. She is not ill-appearing.   HENT:      Head: Normocephalic and atraumatic.   Eyes:      Conjunctiva/sclera: Conjunctivae normal.   Cardiovascular:      Rate and Rhythm: Normal rate.   Pulmonary:      Effort: Pulmonary effort is normal.   Abdominal:      Palpations: Abdomen is soft.      Tenderness: There is no abdominal tenderness. There is no guarding.   Musculoskeletal:         General: No swelling. Normal range of motion.      Cervical back: Normal range of motion  and neck supple.   Skin:     General: Skin is warm and dry.   Neurological:      Mental Status: She is alert.   Psychiatric:         Attention and Perception: Attention and perception normal. She does not perceive auditory or visual hallucinations.         Mood and Affect: Mood is depressed. Affect is flat.         Speech: Speech normal.         Behavior: Behavior normal.         Vital Signs  ED Triage Vitals [04/30/24 1914]   Temperature Pulse Respirations Blood Pressure SpO2   98 °F (36.7 °C) 63 18 158/94 98 %      Temp Source Heart Rate Source Patient Position - Orthostatic VS BP Location FiO2 (%)   Oral Monitor Sitting Right arm --      Pain Score       --           Vitals:    04/30/24 1914 04/30/24 1930 04/30/24 2000   BP: 158/94 126/73 124/63   Pulse: 63     Patient Position - Orthostatic VS: Sitting           Visual Acuity      ED Medications  Medications - No data to display    Diagnostic Studies  Results Reviewed       None                   No orders to display              Procedures  Procedures         ED Course  ED Course as of 04/30/24 2036 Tue Apr 30, 2024 1954 Blood work from yesterday was reviewed- all WNL   2009 OB does not need to see pt                                              Medical Decision Making  No indication for inpatient psych treatment at this time. Treatment options were discussed with patient, and she is in agreement with a referral to [facility], and would also like to receive resources for an outpatient therapist.     Her BP was elevated initially when she came in but repeat BP was normal. She also just had preeclampsia labs yesterday which were all normal. OB stated they do not need to see her today.     I have discussed the plan to discharge pt from ED. The patient was discharged in stable condition.  Patient ambulated off the department.  Extensive return to emergency department precautions were discussed.  Follow up with appropriate providers  including primary care physician was discussed.  Patient and/or their  primary decision maker expressed understanding.  Patient remained stable during entire emergency department stay.               Disposition  Final diagnoses:   Depression     Time reflects when diagnosis was documented in both MDM as applicable and the Disposition within this note       Time User Action Codes Description Comment    4/30/2024  8:13 PM Larry Bernstein Add [F32.A] Depression           ED Disposition       ED Disposition   Discharge    Condition   Stable    Date/Time   Tue Apr 30, 2024  8:13 PM    Comment   Vision Greg Sykes discharge to home/self care.                   MD Documentation      Flowsheet Row Most Recent Value   Sending MD Dr. Maribel Jackson          Follow-up Information    None         Discharge Medication List as of 4/30/2024  8:13 PM        CONTINUE these medications which have NOT CHANGED    Details   Prenatal 27-1 MG TABS Take 1 tablet by mouth in the morning, Starting Mon 4/29/2024, Normal      prenatal vitamin (CLASSIC FORMULA) 27-0.8 mg Take 1 tablet by mouth every morning before breakfast, Historical Med      sertraline (ZOLOFT) 100 mg tablet Take 2 tablets (200 mg total) by mouth daily, Starting Mon 4/22/2024, Until Thu 6/6/2024, Normal             No discharge procedures on file.    PDMP Review       None            ED Provider  Electronically Signed by             Larry Bernstein PA-C  04/30/24 2036

## 2024-05-01 ENCOUNTER — TELEPHONE (OUTPATIENT)
Dept: OBGYN CLINIC | Facility: CLINIC | Age: 24
End: 2024-05-01

## 2024-05-01 ENCOUNTER — TELEPHONE (OUTPATIENT)
Dept: PSYCHIATRY | Facility: CLINIC | Age: 24
End: 2024-05-01

## 2024-05-01 DIAGNOSIS — F41.1 GAD (GENERALIZED ANXIETY DISORDER): ICD-10-CM

## 2024-05-01 RX ORDER — SERTRALINE HYDROCHLORIDE 100 MG/1
150 TABLET, FILM COATED ORAL DAILY
Status: SHIPPED
Start: 2024-05-01 | End: 2024-06-15

## 2024-05-01 NOTE — ED NOTES
Outpatient treatment resources provided and reviewed with patient.    Santhosh Serrano  Crisis Intervention Specialist II  04/30/24

## 2024-05-01 NOTE — DISCHARGE INSTRUCTIONS
This writer discussed the patients current presentation and recommended discharge plan with AKANKSHA Bernstein.  They agree with the patient being discharged at this time with referrals and/or information about [facility], outpatient treatment providers.     The patient was Instructed to follow up with their PCP, Psychiatrist (Dr. Nguyen).   The patient was provided with referral information for:   [facility].     This writer and the patient completed a safety plan.  The patient was provided with a copy of their safety plan with encouragement to utilize the plan following discharge.     In addition, the patient was instructed to call [facility] crisis, other crisis services, 911 or to go to the nearest ER immediately if their situation changes at any time.     This writer discussed discharge plans with the patient, who agrees with and understands the discharge plans.         SAFETY PLAN  Warning Signs (thoughts, images, mood, behavior, situations) of a potential crisis: Increase in suicidal thoughts, overwhelming depression or anxiety      Coping Skills (what can I do to take my mind off the problem, or to keep myself safe): Deep breathing techniques, preferred activities      Outside Support (who can I reach out to for support and help): Significant other, family supports, psychiatrist        National Suicide Prevention Hotline:  [address] [place] [address] [phone] - Crisis   [facility] [phone] - LVF Crisis/Mobile Crisis   [phone] - SLPF Crisis   [facility]: [phone]  [facility]: [phone]   [facility] [phone] - Crisis   [facility] [phone] - Crisis     [facility] [phone] - Crisis   [facility] [phone] - Crisis   [phone] - Peer Support Talk Line (1-9pm daily)  [phone] - Teen Support Talk Line (1-9pm daily)  [phone] - [facility]   [facility] [phone]- Crisis    [facility] [phone] -  Crisis   [facility] [phone] - Crisis    [facility]) [phone] - [facility] Crisis

## 2024-05-01 NOTE — TELEPHONE ENCOUNTER
Pt was informed of her results and also doctor recommendation to take her prenatal vitamins d/t her being slight anemic. Pt stated that she is taking her prenatal vitamins. All questions were answered, no further questions needed,

## 2024-05-01 NOTE — ED NOTES
[facility] REFERRAL     [facility]    Name and Date of Birth:  Jules Sykes 24 y.o. 2000    Date of Referral: April 30, 2024    Presenting Symptoms and Stressors:      Symptoms:  worsening depression and worsening anxiety  Stressors:  recent medication change    Access to Weapons:  No    Smoking Status: denies use    Substance Use:  None    Suicidal Ideation: None, Yes, fleeting suicidal thoughts    Homicidal Ideation: None    Depressed Mood: Yes    Amanda/Hypomania: None    Psychosis: None    Agitation: No    Appetite Changes: decreased appetite    Sleep Disturbance: normal sleep, adequate number of sleep hours    Diagnoses:  Major Depressive Disorder, recurrent, moderate    Current Psychiatrist or Therapist:    Psychiatrist: Dr. Nguyen  Therapist: None    Do they Require Ambulatory Assistance: No    Communication Assistance: not required     Legal Issues: None        Santhosh Serrano

## 2024-05-01 NOTE — TELEPHONE ENCOUNTER
Spoke with the patient today over the phone after going to the ER yesterday for worsening symptoms of depression and anxiety.  Patient had increased dose of Zoloft after the last visit from 100-200 and her symptoms worsened after that.  She denies being suicidal or homicidal but reports lack of energy and motivation regarding completion of her task.  Discussed with patient that she can take on 150 mg which she tried today without having side effects in the next 10 days.  If her symptoms did not improve I suggested to her electroconvulsive therapy as a safer option than other medications and management of depression in pregnancy patient agreed with the plan

## 2024-05-01 NOTE — TELEPHONE ENCOUNTER
----- Message from JAMIE Jacob sent at 5/1/2024  8:42 AM EDT -----  Please let her know she passed her glucose test and the pre eclampsia lab work is negative. She is very slightly anemic so she should make sure she is taking her prenatal vitamins. Thank you!

## 2024-05-07 LAB
2ND TRIMESTER 4 SCREEN SERPL-IMP: NORMAL
AFP ADJ MOM SERPL: NORMAL
AFP INTERP AMN-IMP: NORMAL
AFP INTERP SERPL-IMP: NORMAL
AFP INTERP SERPL-IMP: NORMAL
AFP SERPL-MCNC: 410 NG/ML
AGE AT DELIVERY: 23.3 YR
GA METHOD: NORMAL
GA: 29.1 WEEKS
IDDM PATIENT QL: NO
MULTIPLE PREGNANCY: NO
NEURAL TUBE DEFECT RISK FETUS: NORMAL %

## 2024-05-12 ENCOUNTER — HOSPITAL ENCOUNTER (INPATIENT)
Facility: HOSPITAL | Age: 24
LOS: 6 days | Discharge: HOME | DRG: 540 | End: 2024-05-18
Attending: OBSTETRICS & GYNECOLOGY | Admitting: OBSTETRICS & GYNECOLOGY
Payer: MEDICARE

## 2024-05-12 ENCOUNTER — NURSE TRIAGE (OUTPATIENT)
Dept: OTHER | Facility: OTHER | Age: 24
End: 2024-05-12

## 2024-05-12 DIAGNOSIS — O09.32 INSUFFICIENT PRENATAL CARE IN SECOND TRIMESTER: ICD-10-CM

## 2024-05-12 DIAGNOSIS — Z3A.31 31 WEEKS GESTATION OF PREGNANCY: Primary | ICD-10-CM

## 2024-05-12 DIAGNOSIS — O14.93 PRE-ECLAMPSIA IN THIRD TRIMESTER: ICD-10-CM

## 2024-05-12 DIAGNOSIS — O14.90 PRE-ECLAMPSIA: ICD-10-CM

## 2024-05-12 PROBLEM — Z28.39 RUBELLA NONIMMUNE STATUS, DELIVERED, CURRENT HOSPITALIZATION: Status: ACTIVE | Noted: 2024-05-12

## 2024-05-12 PROBLEM — B37.9 YEAST INFECTION: Status: RESOLVED | Noted: 2024-03-01 | Resolved: 2024-05-12

## 2024-05-12 PROBLEM — O36.8190 DECREASED FETAL MOVEMENT: Status: ACTIVE | Noted: 2024-05-12

## 2024-05-12 PROBLEM — Z78.9 NONIMMUNE TO HEPATITIS B VIRUS: Status: ACTIVE | Noted: 2024-05-12

## 2024-05-12 PROBLEM — R10.9 ABDOMINAL PAIN: Status: RESOLVED | Noted: 2024-03-24 | Resolved: 2024-05-12

## 2024-05-12 PROBLEM — O13.9 GESTATIONAL HYPERTENSION: Status: ACTIVE | Noted: 2024-02-09

## 2024-05-12 LAB
ALBUMIN SERPL BCP-MCNC: 2.9 G/DL (ref 3.5–5)
ALP SERPL-CCNC: 170 U/L (ref 34–104)
ALT SERPL W P-5'-P-CCNC: 15 U/L (ref 7–52)
ANION GAP SERPL CALCULATED.3IONS-SCNC: 7 MMOL/L (ref 4–13)
AST SERPL W P-5'-P-CCNC: 27 U/L (ref 13–39)
BILIRUB SERPL-MCNC: 0.25 MG/DL (ref 0.2–1)
BUN SERPL-MCNC: 16 MG/DL (ref 5–25)
CALCIUM ALBUM COR SERPL-MCNC: 9.2 MG/DL (ref 8.3–10.1)
CALCIUM SERPL-MCNC: 8.3 MG/DL (ref 8.4–10.2)
CHLORIDE SERPL-SCNC: 108 MMOL/L (ref 96–108)
CO2 SERPL-SCNC: 23 MMOL/L (ref 21–32)
CREAT SERPL-MCNC: 0.83 MG/DL (ref 0.6–1.3)
CREAT UR-MCNC: 195.6 MG/DL
ERYTHROCYTE [DISTWIDTH] IN BLOOD BY AUTOMATED COUNT: 15.5 % (ref 11.6–15.1)
GFR SERPL CREATININE-BSD FRML MDRD: 99 ML/MIN/1.73SQ M
GLUCOSE SERPL-MCNC: 79 MG/DL (ref 65–140)
HCT VFR BLD AUTO: 36.3 % (ref 34.8–46.1)
HGB BLD-MCNC: 11.6 G/DL (ref 11.5–15.4)
MAGNESIUM SERPL-MCNC: 2.1 MG/DL (ref 1.9–2.7)
MCH RBC QN AUTO: 27.6 PG (ref 26.8–34.3)
MCHC RBC AUTO-ENTMCNC: 32 G/DL (ref 31.4–37.4)
MCV RBC AUTO: 86 FL (ref 82–98)
PLATELET # BLD AUTO: 207 THOUSANDS/UL (ref 149–390)
PMV BLD AUTO: 12.6 FL (ref 8.9–12.7)
POTASSIUM SERPL-SCNC: 4.7 MMOL/L (ref 3.5–5.3)
PROT SERPL-MCNC: 5.9 G/DL (ref 6.4–8.4)
PROT UR-MCNC: 1824 MG/DL
PROT/CREAT UR: 9.33 MG/G{CREAT} (ref 0–0.1)
RBC # BLD AUTO: 4.2 MILLION/UL (ref 3.81–5.12)
SODIUM SERPL-SCNC: 138 MMOL/L (ref 135–147)
WBC # BLD AUTO: 10.77 THOUSAND/UL (ref 4.31–10.16)

## 2024-05-12 PROCEDURE — 99214 OFFICE O/P EST MOD 30 MIN: CPT

## 2024-05-12 PROCEDURE — 96374 THER/PROPH/DIAG INJ IV PUSH: CPT

## 2024-05-12 PROCEDURE — 96372 THER/PROPH/DIAG INJ SC/IM: CPT

## 2024-05-12 PROCEDURE — 96365 THER/PROPH/DIAG IV INF INIT: CPT

## 2024-05-12 PROCEDURE — 96366 THER/PROPH/DIAG IV INF ADDON: CPT

## 2024-05-12 PROCEDURE — 83735 ASSAY OF MAGNESIUM: CPT

## 2024-05-12 PROCEDURE — 96360 HYDRATION IV INFUSION INIT: CPT

## 2024-05-12 PROCEDURE — 96375 TX/PRO/DX INJ NEW DRUG ADDON: CPT

## 2024-05-12 PROCEDURE — 82570 ASSAY OF URINE CREATININE: CPT

## 2024-05-12 PROCEDURE — 85027 COMPLETE CBC AUTOMATED: CPT

## 2024-05-12 PROCEDURE — 4A1HXCZ MONITORING OF PRODUCTS OF CONCEPTION, CARDIAC RATE, EXTERNAL APPROACH: ICD-10-PCS | Performed by: OBSTETRICS & GYNECOLOGY

## 2024-05-12 PROCEDURE — 84156 ASSAY OF PROTEIN URINE: CPT

## 2024-05-12 PROCEDURE — 86780 TREPONEMA PALLIDUM: CPT

## 2024-05-12 PROCEDURE — 99255 IP/OBS CONSLTJ NEW/EST HI 80: CPT | Performed by: OBSTETRICS & GYNECOLOGY

## 2024-05-12 PROCEDURE — 96376 TX/PRO/DX INJ SAME DRUG ADON: CPT

## 2024-05-12 PROCEDURE — NC001 PR NO CHARGE: Performed by: OBSTETRICS & GYNECOLOGY

## 2024-05-12 PROCEDURE — 80053 COMPREHEN METABOLIC PANEL: CPT

## 2024-05-12 RX ORDER — CALCIUM GLUCONATE 94 MG/ML
1 INJECTION, SOLUTION INTRAVENOUS ONCE AS NEEDED
Status: DISCONTINUED | OUTPATIENT
Start: 2024-05-12 | End: 2024-05-18 | Stop reason: HOSPADM

## 2024-05-12 RX ORDER — LABETALOL HYDROCHLORIDE 5 MG/ML
80 INJECTION, SOLUTION INTRAVENOUS ONCE
Status: COMPLETED | OUTPATIENT
Start: 2024-05-12 | End: 2024-05-12

## 2024-05-12 RX ORDER — LANOLIN ALCOHOL/MO/W.PET/CERES
3 CREAM (GRAM) TOPICAL
Status: DISCONTINUED | OUTPATIENT
Start: 2024-05-12 | End: 2024-05-13

## 2024-05-12 RX ORDER — BETAMETHASONE SODIUM PHOSPHATE AND BETAMETHASONE ACETATE 3; 3 MG/ML; MG/ML
12 INJECTION, SUSPENSION INTRA-ARTICULAR; INTRALESIONAL; INTRAMUSCULAR; SOFT TISSUE EVERY 24 HOURS
Status: DISCONTINUED | OUTPATIENT
Start: 2024-05-12 | End: 2024-05-13

## 2024-05-12 RX ORDER — LABETALOL HYDROCHLORIDE 5 MG/ML
20 INJECTION, SOLUTION INTRAVENOUS ONCE
Status: COMPLETED | OUTPATIENT
Start: 2024-05-12 | End: 2024-05-12

## 2024-05-12 RX ORDER — MAGNESIUM SULFATE HEPTAHYDRATE 40 MG/ML
2 INJECTION, SOLUTION INTRAVENOUS ONCE
Status: COMPLETED | OUTPATIENT
Start: 2024-05-12 | End: 2024-05-12

## 2024-05-12 RX ORDER — SODIUM CHLORIDE, SODIUM LACTATE, POTASSIUM CHLORIDE, CALCIUM CHLORIDE 600; 310; 30; 20 MG/100ML; MG/100ML; MG/100ML; MG/100ML
50 INJECTION, SOLUTION INTRAVENOUS CONTINUOUS
Status: DISCONTINUED | OUTPATIENT
Start: 2024-05-12 | End: 2024-05-14

## 2024-05-12 RX ORDER — LABETALOL HYDROCHLORIDE 5 MG/ML
40 INJECTION, SOLUTION INTRAVENOUS ONCE
Status: COMPLETED | OUTPATIENT
Start: 2024-05-12 | End: 2024-05-12

## 2024-05-12 RX ORDER — HYDRALAZINE HYDROCHLORIDE 20 MG/ML
INJECTION INTRAMUSCULAR; INTRAVENOUS
Status: COMPLETED
Start: 2024-05-12 | End: 2024-05-12

## 2024-05-12 RX ORDER — HYDROXYZINE HYDROCHLORIDE 25 MG/1
25 TABLET, FILM COATED ORAL EVERY 6 HOURS PRN
Status: DISCONTINUED | OUTPATIENT
Start: 2024-05-12 | End: 2024-05-12

## 2024-05-12 RX ORDER — ONDANSETRON 2 MG/ML
4 INJECTION INTRAMUSCULAR; INTRAVENOUS EVERY 6 HOURS PRN
Status: DISCONTINUED | OUTPATIENT
Start: 2024-05-12 | End: 2024-05-13

## 2024-05-12 RX ORDER — CALCIUM CARBONATE 500 MG/1
1000 TABLET, CHEWABLE ORAL 3 TIMES DAILY PRN
Status: DISCONTINUED | OUTPATIENT
Start: 2024-05-12 | End: 2024-05-13

## 2024-05-12 RX ORDER — MAGNESIUM SULFATE HEPTAHYDRATE 40 MG/ML
4 INJECTION, SOLUTION INTRAVENOUS ONCE
Status: COMPLETED | OUTPATIENT
Start: 2024-05-12 | End: 2024-05-12

## 2024-05-12 RX ORDER — LABETALOL HYDROCHLORIDE 5 MG/ML
INJECTION, SOLUTION INTRAVENOUS
Status: COMPLETED
Start: 2024-05-12 | End: 2024-05-12

## 2024-05-12 RX ORDER — ACETAMINOPHEN 325 MG/1
975 TABLET ORAL EVERY 8 HOURS PRN
Status: DISCONTINUED | OUTPATIENT
Start: 2024-05-12 | End: 2024-05-13

## 2024-05-12 RX ORDER — MAGNESIUM SULFATE HEPTAHYDRATE 40 MG/ML
2 INJECTION, SOLUTION INTRAVENOUS CONTINUOUS
Status: DISCONTINUED | OUTPATIENT
Start: 2024-05-12 | End: 2024-05-14

## 2024-05-12 RX ORDER — NIFEDIPINE 10 MG/1
10 CAPSULE ORAL ONCE
Status: COMPLETED | OUTPATIENT
Start: 2024-05-12 | End: 2024-05-12

## 2024-05-12 RX ORDER — HYDRALAZINE HYDROCHLORIDE 20 MG/ML
5 INJECTION INTRAMUSCULAR; INTRAVENOUS ONCE
Status: COMPLETED | OUTPATIENT
Start: 2024-05-12 | End: 2024-05-12

## 2024-05-12 RX ORDER — HYDRALAZINE HYDROCHLORIDE 20 MG/ML
5 INJECTION INTRAMUSCULAR; INTRAVENOUS ONCE
Status: CANCELLED | OUTPATIENT
Start: 2024-05-12

## 2024-05-12 RX ORDER — LABETALOL HYDROCHLORIDE 5 MG/ML
10 INJECTION, SOLUTION INTRAVENOUS ONCE
Status: DISCONTINUED | OUTPATIENT
Start: 2024-05-12 | End: 2024-05-12

## 2024-05-12 RX ORDER — NIFEDIPINE 10 MG/1
10 CAPSULE ORAL ONCE
Status: DISCONTINUED | OUTPATIENT
Start: 2024-05-12 | End: 2024-05-12

## 2024-05-12 RX ADMIN — HYDRALAZINE HYDROCHLORIDE 5 MG: 20 INJECTION, SOLUTION INTRAMUSCULAR; INTRAVENOUS at 21:15

## 2024-05-12 RX ADMIN — MAGNESIUM SULFATE HEPTAHYDRATE 2 G: 40 INJECTION, SOLUTION INTRAVENOUS at 20:56

## 2024-05-12 RX ADMIN — MAGNESIUM SULFATE HEPTAHYDRATE 4 G: 40 INJECTION, SOLUTION INTRAVENOUS at 20:26

## 2024-05-12 RX ADMIN — Medication 40 MG: at 20:32

## 2024-05-12 RX ADMIN — LABETALOL HYDROCHLORIDE 20 MG: 5 INJECTION, SOLUTION INTRAVENOUS at 20:14

## 2024-05-12 RX ADMIN — BETAMETHASONE SODIUM PHOSPHATE AND BETAMETHASONE ACETATE 12 MG: 3; 3 INJECTION, SUSPENSION INTRA-ARTICULAR; INTRALESIONAL; INTRAMUSCULAR at 20:22

## 2024-05-12 RX ADMIN — NIFEDIPINE 10 MG: 10 CAPSULE ORAL at 19:34

## 2024-05-12 RX ADMIN — Medication 20 MG: at 20:14

## 2024-05-12 RX ADMIN — HYDRALAZINE HYDROCHLORIDE 5 MG: 20 INJECTION INTRAMUSCULAR; INTRAVENOUS at 21:15

## 2024-05-12 RX ADMIN — MAGNESIUM SULFATE HEPTAHYDRATE 2 G/HR: 40 INJECTION, SOLUTION INTRAVENOUS at 21:17

## 2024-05-12 RX ADMIN — Medication 80 MG: at 20:52

## 2024-05-12 RX ADMIN — SODIUM CHLORIDE, SODIUM LACTATE, POTASSIUM CHLORIDE, AND CALCIUM CHLORIDE 50 ML/HR: .6; .31; .03; .02 INJECTION, SOLUTION INTRAVENOUS at 20:25

## 2024-05-12 NOTE — TELEPHONE ENCOUNTER
"Reason for Disposition  • [1] Pregnant 23 or more weeks AND [2] no movement of baby > 8 hours    Answer Assessment - Initial Assessment Questions  1. FETAL MOVEMENT: \"Has the baby's movement decreased or changed significantly from normal?\" (e.g., yes, no; describe)      Yes- patient states she hasn't felt baby move all day day. Last time she felt baby move was last night.     2. BRENNA: \"What date are you expecting to deliver?\"       7/14/2024    3. PREGNANCY: \"How many weeks pregnant are you?\"       31w0d    4. OTHER SYMPTOMS: \"Do you have any other symptoms?\" (e.g., abdominal pain, leaking fluid from vagina, vaginal bleeding, etc.)      Last night had headache and took hydroxyzine 25mg for sleep and anxiety    Protocols used: Pregnancy - Decreased Fetal Movement-ADULT-AH    "

## 2024-05-12 NOTE — TELEPHONE ENCOUNTER
"Regarding: decreased fetal movement/31 weeks pregnant  ----- Message from Cookie Arriaga sent at 5/12/2024  5:47 PM EDT -----  Pt states \"I am 31 weeks pregnant and I havent felt the baby move all day\"    "

## 2024-05-12 NOTE — TELEPHONE ENCOUNTER
On call provider paged. Advised pt to proceed to  [facility]. Charge RN notified. Pt verbalized understanding.

## 2024-05-13 ENCOUNTER — ANESTHESIA (INPATIENT)
Dept: LABOR AND DELIVERY | Facility: HOSPITAL | Age: 24
End: 2024-05-13
Payer: MEDICARE

## 2024-05-13 ENCOUNTER — ANESTHESIA EVENT (INPATIENT)
Dept: LABOR AND DELIVERY | Facility: HOSPITAL | Age: 24
End: 2024-05-13
Payer: MEDICARE

## 2024-05-13 PROBLEM — Z98.891 HISTORY OF CLASSICAL CESAREAN SECTION: Status: ACTIVE | Noted: 2024-05-13

## 2024-05-13 LAB
ABO GROUP BLD: NORMAL
ALBUMIN SERPL BCP-MCNC: 2.9 G/DL (ref 3.5–5)
ALP SERPL-CCNC: 144 U/L (ref 34–104)
ALP SERPL-CCNC: 163 U/L (ref 34–104)
ALP SERPL-CCNC: 173 U/L (ref 34–104)
ALT SERPL W P-5'-P-CCNC: 16 U/L (ref 7–52)
ALT SERPL W P-5'-P-CCNC: 18 U/L (ref 7–52)
ALT SERPL W P-5'-P-CCNC: 18 U/L (ref 7–52)
ANION GAP SERPL CALCULATED.3IONS-SCNC: 10 MMOL/L (ref 4–13)
ANION GAP SERPL CALCULATED.3IONS-SCNC: 11 MMOL/L (ref 4–13)
ANION GAP SERPL CALCULATED.3IONS-SCNC: 8 MMOL/L (ref 4–13)
AST SERPL W P-5'-P-CCNC: 24 U/L (ref 13–39)
AST SERPL W P-5'-P-CCNC: 25 U/L (ref 13–39)
AST SERPL W P-5'-P-CCNC: 29 U/L (ref 13–39)
BASE EXCESS BLDCOA CALC-SCNC: -14.3 MMOL/L (ref 3–11)
BASE EXCESS BLDCOV CALC-SCNC: -14.8 MMOL/L (ref 1–9)
BILIRUB SERPL-MCNC: 0.16 MG/DL (ref 0.2–1)
BILIRUB SERPL-MCNC: 0.17 MG/DL (ref 0.2–1)
BILIRUB SERPL-MCNC: 0.24 MG/DL (ref 0.2–1)
BLD GP AB SCN SERPL QL: NEGATIVE
BUN SERPL-MCNC: 15 MG/DL (ref 5–25)
BUN SERPL-MCNC: 17 MG/DL (ref 5–25)
BUN SERPL-MCNC: 18 MG/DL (ref 5–25)
CALCIUM ALBUM COR SERPL-MCNC: 8.1 MG/DL (ref 8.3–10.1)
CALCIUM ALBUM COR SERPL-MCNC: 8.2 MG/DL (ref 8.3–10.1)
CALCIUM ALBUM COR SERPL-MCNC: 8.7 MG/DL (ref 8.3–10.1)
CALCIUM SERPL-MCNC: 7.2 MG/DL (ref 8.4–10.2)
CALCIUM SERPL-MCNC: 7.3 MG/DL (ref 8.4–10.2)
CALCIUM SERPL-MCNC: 7.8 MG/DL (ref 8.4–10.2)
CHLORIDE SERPL-SCNC: 102 MMOL/L (ref 96–108)
CHLORIDE SERPL-SCNC: 104 MMOL/L (ref 96–108)
CHLORIDE SERPL-SCNC: 105 MMOL/L (ref 96–108)
CO2 SERPL-SCNC: 18 MMOL/L (ref 21–32)
CO2 SERPL-SCNC: 19 MMOL/L (ref 21–32)
CO2 SERPL-SCNC: 22 MMOL/L (ref 21–32)
CREAT SERPL-MCNC: 0.81 MG/DL (ref 0.6–1.3)
CREAT SERPL-MCNC: 0.9 MG/DL (ref 0.6–1.3)
CREAT SERPL-MCNC: 1.01 MG/DL (ref 0.6–1.3)
ERYTHROCYTE [DISTWIDTH] IN BLOOD BY AUTOMATED COUNT: 15.7 % (ref 11.6–15.1)
ERYTHROCYTE [DISTWIDTH] IN BLOOD BY AUTOMATED COUNT: 15.9 % (ref 11.6–15.1)
ERYTHROCYTE [DISTWIDTH] IN BLOOD BY AUTOMATED COUNT: 15.9 % (ref 11.6–15.1)
GFR SERPL CREATININE-BSD FRML MDRD: 102 ML/MIN/1.73SQ M
GFR SERPL CREATININE-BSD FRML MDRD: 78 ML/MIN/1.73SQ M
GFR SERPL CREATININE-BSD FRML MDRD: 90 ML/MIN/1.73SQ M
GLUCOSE SERPL-MCNC: 114 MG/DL (ref 65–140)
GLUCOSE SERPL-MCNC: 136 MG/DL (ref 65–140)
GLUCOSE SERPL-MCNC: 156 MG/DL (ref 65–140)
HCO3 BLDCOA-SCNC: 18 MMOL/L (ref 17.3–27.3)
HCO3 BLDCOV-SCNC: 15.2 MMOL/L (ref 12.2–28.6)
HCT VFR BLD AUTO: 32.7 % (ref 34.8–46.1)
HCT VFR BLD AUTO: 36 % (ref 34.8–46.1)
HCT VFR BLD AUTO: 38.6 % (ref 34.8–46.1)
HGB BLD-MCNC: 10.5 G/DL (ref 11.5–15.4)
HGB BLD-MCNC: 11.6 G/DL (ref 11.5–15.4)
HGB BLD-MCNC: 12.4 G/DL (ref 11.5–15.4)
MAGNESIUM SERPL-MCNC: 5.8 MG/DL (ref 1.9–2.7)
MAGNESIUM SERPL-MCNC: 6.5 MG/DL (ref 1.9–2.7)
MAGNESIUM SERPL-MCNC: 7.6 MG/DL (ref 1.9–2.7)
MCH RBC QN AUTO: 27.3 PG (ref 26.8–34.3)
MCH RBC QN AUTO: 27.6 PG (ref 26.8–34.3)
MCH RBC QN AUTO: 27.9 PG (ref 26.8–34.3)
MCHC RBC AUTO-ENTMCNC: 32.1 G/DL (ref 31.4–37.4)
MCHC RBC AUTO-ENTMCNC: 32.1 G/DL (ref 31.4–37.4)
MCHC RBC AUTO-ENTMCNC: 32.2 G/DL (ref 31.4–37.4)
MCV RBC AUTO: 85 FL (ref 82–98)
MCV RBC AUTO: 86 FL (ref 82–98)
MCV RBC AUTO: 87 FL (ref 82–98)
O2 CT VFR BLDCOA CALC: 4.8 ML/DL
OXYHGB MFR BLDCOA: 22.4 %
OXYHGB MFR BLDCOV: 39.5 %
PCO2 BLDCOA: 71.9 MM[HG] (ref 30–60)
PCO2 BLDCOV: 51 MM HG (ref 27–43)
PH BLDCOA: 7.02 [PH] (ref 7.23–7.43)
PH BLDCOV: 7.09 [PH] (ref 7.19–7.49)
PLATELET # BLD AUTO: 229 THOUSANDS/UL (ref 149–390)
PLATELET # BLD AUTO: 253 THOUSANDS/UL (ref 149–390)
PLATELET # BLD AUTO: 278 THOUSANDS/UL (ref 149–390)
PMV BLD AUTO: 12.1 FL (ref 8.9–12.7)
PMV BLD AUTO: 12.3 FL (ref 8.9–12.7)
PMV BLD AUTO: 12.6 FL (ref 8.9–12.7)
PO2 BLDCOA: 15.6 MM HG (ref 5–25)
PO2 BLDCOV: 21.6 MM HG (ref 15–45)
POTASSIUM SERPL-SCNC: 4.6 MMOL/L (ref 3.5–5.3)
POTASSIUM SERPL-SCNC: 4.9 MMOL/L (ref 3.5–5.3)
POTASSIUM SERPL-SCNC: 5 MMOL/L (ref 3.5–5.3)
PROT SERPL-MCNC: 5.7 G/DL (ref 6.4–8.4)
PROT SERPL-MCNC: 5.7 G/DL (ref 6.4–8.4)
PROT SERPL-MCNC: 6 G/DL (ref 6.4–8.4)
RBC # BLD AUTO: 3.76 MILLION/UL (ref 3.81–5.12)
RBC # BLD AUTO: 4.21 MILLION/UL (ref 3.81–5.12)
RBC # BLD AUTO: 4.55 MILLION/UL (ref 3.81–5.12)
RH BLD: POSITIVE
SAO2 % BLDCOV: 8.7 ML/DL
SODIUM SERPL-SCNC: 132 MMOL/L (ref 135–147)
SODIUM SERPL-SCNC: 133 MMOL/L (ref 135–147)
SODIUM SERPL-SCNC: 134 MMOL/L (ref 135–147)
SPECIMEN EXPIRATION DATE: NORMAL
TREPONEMA PALLIDUM IGG+IGM AB [PRESENCE] IN SERUM OR PLASMA BY IMMUNOASSAY: NORMAL
WBC # BLD AUTO: 15.84 THOUSAND/UL (ref 4.31–10.16)
WBC # BLD AUTO: 21.02 THOUSAND/UL (ref 4.31–10.16)
WBC # BLD AUTO: 23.34 THOUSAND/UL (ref 4.31–10.16)

## 2024-05-13 PROCEDURE — 80053 COMPREHEN METABOLIC PANEL: CPT

## 2024-05-13 PROCEDURE — 85027 COMPLETE CBC AUTOMATED: CPT

## 2024-05-13 PROCEDURE — 86850 RBC ANTIBODY SCREEN: CPT | Performed by: OBSTETRICS & GYNECOLOGY

## 2024-05-13 PROCEDURE — NC001 PR NO CHARGE: Performed by: OBSTETRICS & GYNECOLOGY

## 2024-05-13 PROCEDURE — 86900 BLOOD TYPING SEROLOGIC ABO: CPT | Performed by: OBSTETRICS & GYNECOLOGY

## 2024-05-13 PROCEDURE — 88307 TISSUE EXAM BY PATHOLOGIST: CPT | Performed by: PATHOLOGY

## 2024-05-13 PROCEDURE — 87150 DNA/RNA AMPLIFIED PROBE: CPT

## 2024-05-13 PROCEDURE — 83735 ASSAY OF MAGNESIUM: CPT

## 2024-05-13 PROCEDURE — C1765 ADHESION BARRIER: HCPCS | Performed by: OBSTETRICS & GYNECOLOGY

## 2024-05-13 PROCEDURE — 82805 BLOOD GASES W/O2 SATURATION: CPT | Performed by: OBSTETRICS & GYNECOLOGY

## 2024-05-13 PROCEDURE — 86901 BLOOD TYPING SEROLOGIC RH(D): CPT | Performed by: OBSTETRICS & GYNECOLOGY

## 2024-05-13 RX ORDER — SENNOSIDES 8.6 MG
1 TABLET ORAL DAILY
Status: DISCONTINUED | OUTPATIENT
Start: 2024-05-13 | End: 2024-05-17

## 2024-05-13 RX ORDER — ENOXAPARIN SODIUM 100 MG/ML
40 INJECTION SUBCUTANEOUS
Status: DISCONTINUED | OUTPATIENT
Start: 2024-05-14 | End: 2024-05-13

## 2024-05-13 RX ORDER — ENOXAPARIN SODIUM 100 MG/ML
40 INJECTION SUBCUTANEOUS EVERY 12 HOURS
Status: DISCONTINUED | OUTPATIENT
Start: 2024-05-14 | End: 2024-05-13

## 2024-05-13 RX ORDER — HYDROMORPHONE HCL/PF 1 MG/ML
0.5 SYRINGE (ML) INJECTION EVERY 2 HOUR PRN
Status: DISCONTINUED | OUTPATIENT
Start: 2024-05-13 | End: 2024-05-18 | Stop reason: HOSPADM

## 2024-05-13 RX ORDER — METOCLOPRAMIDE HYDROCHLORIDE 5 MG/ML
10 INJECTION INTRAMUSCULAR; INTRAVENOUS ONCE AS NEEDED
Status: DISCONTINUED | OUTPATIENT
Start: 2024-05-13 | End: 2024-05-13

## 2024-05-13 RX ORDER — CEFAZOLIN SODIUM 2 G/50ML
2000 SOLUTION INTRAVENOUS ONCE
Status: COMPLETED | OUTPATIENT
Start: 2024-05-13 | End: 2024-05-13

## 2024-05-13 RX ORDER — ONDANSETRON 2 MG/ML
4 INJECTION INTRAMUSCULAR; INTRAVENOUS ONCE AS NEEDED
Status: DISCONTINUED | OUTPATIENT
Start: 2024-05-13 | End: 2024-05-13

## 2024-05-13 RX ORDER — BUPIVACAINE HYDROCHLORIDE 7.5 MG/ML
INJECTION, SOLUTION INTRASPINAL AS NEEDED
Status: DISCONTINUED | OUTPATIENT
Start: 2024-05-13 | End: 2024-05-13

## 2024-05-13 RX ORDER — CALCIUM CARBONATE 500 MG/1
1000 TABLET, CHEWABLE ORAL DAILY PRN
Status: DISCONTINUED | OUTPATIENT
Start: 2024-05-13 | End: 2024-05-18 | Stop reason: HOSPADM

## 2024-05-13 RX ORDER — PROPOFOL 10 MG/ML
INJECTION, EMULSION INTRAVENOUS AS NEEDED
Status: DISCONTINUED | OUTPATIENT
Start: 2024-05-13 | End: 2024-05-13

## 2024-05-13 RX ORDER — HYDROMORPHONE HCL/PF 1 MG/ML
0.5 SYRINGE (ML) INJECTION
Status: DISCONTINUED | OUTPATIENT
Start: 2024-05-13 | End: 2024-05-13

## 2024-05-13 RX ORDER — OXYCODONE HYDROCHLORIDE 5 MG/1
5 TABLET ORAL EVERY 4 HOURS PRN
Status: DISCONTINUED | OUTPATIENT
Start: 2024-05-14 | End: 2024-05-18 | Stop reason: HOSPADM

## 2024-05-13 RX ORDER — ENOXAPARIN SODIUM 100 MG/ML
40 INJECTION SUBCUTANEOUS EVERY 12 HOURS SCHEDULED
Status: DISCONTINUED | OUTPATIENT
Start: 2024-05-14 | End: 2024-05-14

## 2024-05-13 RX ORDER — FENTANYL CITRATE 50 UG/ML
INJECTION, SOLUTION INTRAMUSCULAR; INTRAVENOUS AS NEEDED
Status: DISCONTINUED | OUTPATIENT
Start: 2024-05-13 | End: 2024-05-13

## 2024-05-13 RX ORDER — MORPHINE SULFATE 0.5 MG/ML
INJECTION, SOLUTION EPIDURAL; INTRATHECAL; INTRAVENOUS AS NEEDED
Status: DISCONTINUED | OUTPATIENT
Start: 2024-05-13 | End: 2024-05-13

## 2024-05-13 RX ORDER — ALBUTEROL SULFATE 2.5 MG/3ML
2.5 SOLUTION RESPIRATORY (INHALATION) ONCE AS NEEDED
Status: DISCONTINUED | OUTPATIENT
Start: 2024-05-13 | End: 2024-05-18 | Stop reason: HOSPADM

## 2024-05-13 RX ORDER — METOCLOPRAMIDE HYDROCHLORIDE 5 MG/ML
10 INJECTION INTRAMUSCULAR; INTRAVENOUS ONCE
Status: COMPLETED | OUTPATIENT
Start: 2024-05-13 | End: 2024-05-13

## 2024-05-13 RX ORDER — NALBUPHINE HYDROCHLORIDE 10 MG/ML
5 INJECTION, SOLUTION INTRAMUSCULAR; INTRAVENOUS; SUBCUTANEOUS
Status: DISPENSED | OUTPATIENT
Start: 2024-05-13 | End: 2024-05-14

## 2024-05-13 RX ORDER — KETOROLAC TROMETHAMINE 30 MG/ML
INJECTION, SOLUTION INTRAMUSCULAR; INTRAVENOUS AS NEEDED
Status: DISCONTINUED | OUTPATIENT
Start: 2024-05-13 | End: 2024-05-13

## 2024-05-13 RX ORDER — SIMETHICONE 80 MG
80 TABLET,CHEWABLE ORAL 4 TIMES DAILY PRN
Status: DISCONTINUED | OUTPATIENT
Start: 2024-05-13 | End: 2024-05-18 | Stop reason: HOSPADM

## 2024-05-13 RX ORDER — ONDANSETRON 2 MG/ML
4 INJECTION INTRAMUSCULAR; INTRAVENOUS EVERY 6 HOURS PRN
Status: ACTIVE | OUTPATIENT
Start: 2024-05-13 | End: 2024-05-14

## 2024-05-13 RX ORDER — DIPHENHYDRAMINE HCL 25 MG
25 TABLET ORAL EVERY 6 HOURS PRN
Status: DISCONTINUED | OUTPATIENT
Start: 2024-05-13 | End: 2024-05-18 | Stop reason: HOSPADM

## 2024-05-13 RX ORDER — OXYTOCIN/RINGER'S LACTATE 30/500 ML
PLASTIC BAG, INJECTION (ML) INTRAVENOUS CONTINUOUS PRN
Status: DISCONTINUED | OUTPATIENT
Start: 2024-05-13 | End: 2024-05-13

## 2024-05-13 RX ORDER — DIPHENHYDRAMINE HYDROCHLORIDE 50 MG/ML
25 INJECTION INTRAMUSCULAR; INTRAVENOUS ONCE
Status: COMPLETED | OUTPATIENT
Start: 2024-05-13 | End: 2024-05-13

## 2024-05-13 RX ORDER — NALOXONE HYDROCHLORIDE 0.4 MG/ML
0.1 INJECTION, SOLUTION INTRAMUSCULAR; INTRAVENOUS; SUBCUTANEOUS
Status: ACTIVE | OUTPATIENT
Start: 2024-05-13 | End: 2024-05-14

## 2024-05-13 RX ORDER — OXYTOCIN/RINGER'S LACTATE 30/500 ML
62.5 PLASTIC BAG, INJECTION (ML) INTRAVENOUS ONCE
Status: COMPLETED | OUTPATIENT
Start: 2024-05-13 | End: 2024-05-13

## 2024-05-13 RX ORDER — FENTANYL CITRATE/PF 50 MCG/ML
50 SYRINGE (ML) INJECTION
Status: DISCONTINUED | OUTPATIENT
Start: 2024-05-13 | End: 2024-05-18 | Stop reason: HOSPADM

## 2024-05-13 RX ORDER — BENZOCAINE/MENTHOL 6 MG-10 MG
1 LOZENGE MUCOUS MEMBRANE DAILY PRN
Status: DISCONTINUED | OUTPATIENT
Start: 2024-05-13 | End: 2024-05-18 | Stop reason: HOSPADM

## 2024-05-13 RX ORDER — DEXAMETHASONE SODIUM PHOSPHATE 10 MG/ML
INJECTION, SOLUTION INTRAMUSCULAR; INTRAVENOUS AS NEEDED
Status: DISCONTINUED | OUTPATIENT
Start: 2024-05-13 | End: 2024-05-13

## 2024-05-13 RX ORDER — ACETAMINOPHEN 325 MG/1
650 TABLET ORAL EVERY 6 HOURS SCHEDULED
Status: DISPENSED | OUTPATIENT
Start: 2024-05-13 | End: 2024-05-16

## 2024-05-13 RX ORDER — KETOROLAC TROMETHAMINE 30 MG/ML
30 INJECTION, SOLUTION INTRAMUSCULAR; INTRAVENOUS EVERY 6 HOURS
Status: COMPLETED | OUTPATIENT
Start: 2024-05-13 | End: 2024-05-14

## 2024-05-13 RX ADMIN — FENTANYL CITRATE 25 MCG: 50 INJECTION, SOLUTION INTRAMUSCULAR; INTRAVENOUS at 08:10

## 2024-05-13 RX ADMIN — PROPOFOL 10 MG: 10 INJECTION, EMULSION INTRAVENOUS at 08:01

## 2024-05-13 RX ADMIN — ACETAMINOPHEN 975 MG: 325 TABLET, FILM COATED ORAL at 01:08

## 2024-05-13 RX ADMIN — SERTRALINE HYDROCHLORIDE 150 MG: 50 TABLET ORAL at 10:00

## 2024-05-13 RX ADMIN — METOCLOPRAMIDE 10 MG: 5 INJECTION, SOLUTION INTRAMUSCULAR; INTRAVENOUS at 01:11

## 2024-05-13 RX ADMIN — KETOROLAC TROMETHAMINE 30 MG: 30 INJECTION, SOLUTION INTRAMUSCULAR; INTRAVENOUS at 20:13

## 2024-05-13 RX ADMIN — PHENYLEPHRINE HYDROCHLORIDE 100 MCG: 50 INJECTION INTRAVENOUS at 07:47

## 2024-05-13 RX ADMIN — KETOROLAC TROMETHAMINE 30 MG: 30 INJECTION, SOLUTION INTRAMUSCULAR; INTRAVENOUS at 14:00

## 2024-05-13 RX ADMIN — KETOROLAC TROMETHAMINE 30 MG: 30 INJECTION, SOLUTION INTRAMUSCULAR; INTRAVENOUS at 08:39

## 2024-05-13 RX ADMIN — FENTANYL CITRATE 15 MCG: 50 INJECTION INTRAMUSCULAR; INTRAVENOUS at 07:44

## 2024-05-13 RX ADMIN — DEXAMETHASONE SODIUM PHOSPHATE 10 MG: 10 INJECTION, SOLUTION INTRAMUSCULAR; INTRAVENOUS at 07:57

## 2024-05-13 RX ADMIN — DIPHENHYDRAMINE HYDROCHLORIDE 25 MG: 50 INJECTION, SOLUTION INTRAMUSCULAR; INTRAVENOUS at 01:11

## 2024-05-13 RX ADMIN — MAGNESIUM SULFATE HEPTAHYDRATE 2 G/HR: 40 INJECTION, SOLUTION INTRAVENOUS at 07:02

## 2024-05-13 RX ADMIN — PHENYLEPHRINE HYDROCHLORIDE 100 MCG: 50 INJECTION INTRAVENOUS at 07:54

## 2024-05-13 RX ADMIN — NALBUPHINE HYDROCHLORIDE 5 MG: 10 INJECTION, SOLUTION INTRAMUSCULAR; INTRAVENOUS; SUBCUTANEOUS at 20:27

## 2024-05-13 RX ADMIN — MORPHINE SULFATE 0.15 MG: 0.5 INJECTION, SOLUTION EPIDURAL; INTRATHECAL; INTRAVENOUS at 07:44

## 2024-05-13 RX ADMIN — SODIUM CHLORIDE, SODIUM LACTATE, POTASSIUM CHLORIDE, AND CALCIUM CHLORIDE 50 ML/HR: .6; .31; .03; .02 INJECTION, SOLUTION INTRAVENOUS at 02:59

## 2024-05-13 RX ADMIN — BUPIVACAINE HYDROCHLORIDE IN DEXTROSE 1.4 ML: 7.5 INJECTION, SOLUTION SUBARACHNOID at 07:44

## 2024-05-13 RX ADMIN — SENNOSIDES 8.6 MG: 8.6 TABLET, FILM COATED ORAL at 09:45

## 2024-05-13 RX ADMIN — MAGNESIUM SULFATE HEPTAHYDRATE 2 G/HR: 40 INJECTION, SOLUTION INTRAVENOUS at 16:09

## 2024-05-13 RX ADMIN — PHENYLEPHRINE HYDROCHLORIDE 50 MCG/MIN: 50 INJECTION INTRAVENOUS at 07:45

## 2024-05-13 RX ADMIN — Medication 250 MILLI-UNITS/MIN: at 07:54

## 2024-05-13 RX ADMIN — CEFAZOLIN SODIUM 2000 MG: 2 SOLUTION INTRAVENOUS at 07:45

## 2024-05-13 RX ADMIN — PROPOFOL 10 MG: 10 INJECTION, EMULSION INTRAVENOUS at 08:04

## 2024-05-13 RX ADMIN — NALBUPHINE HYDROCHLORIDE 5 MG: 10 INJECTION, SOLUTION INTRAMUSCULAR; INTRAVENOUS; SUBCUTANEOUS at 14:01

## 2024-05-13 RX ADMIN — SODIUM CHLORIDE, SODIUM LACTATE, POTASSIUM CHLORIDE, AND CALCIUM CHLORIDE 50 ML/HR: .6; .31; .03; .02 INJECTION, SOLUTION INTRAVENOUS at 16:09

## 2024-05-13 RX ADMIN — OXYTOCIN 62.5 MILLI-UNITS/MIN: 10 INJECTION INTRAVENOUS at 09:35

## 2024-05-13 RX ADMIN — ONDANSETRON 4 MG: 2 INJECTION INTRAMUSCULAR; INTRAVENOUS at 07:57

## 2024-05-13 NOTE — ASSESSMENT & PLAN NOTE
No routine OB care from 20w to 29w1d.    Consider case management consult postpartum vs. antepartum

## 2024-05-13 NOTE — ANESTHESIA PROCEDURE NOTES
Spinal Block    Patient location during procedure: OR  Start time: 5/13/2024 7:44 AM  Reason for block: procedure for pain and at surgeon's request  Staffing  Performed by: Sonja Boyle CRNA  Authorized by: Israel Ann DO    Preanesthetic Checklist  Completed: patient identified, IV checked, site marked, risks and benefits discussed, surgical consent, monitors and equipment checked, pre-op evaluation and timeout performed  Spinal Block  Patient position: sitting  Prep: ChloraPrep and site prepped and draped  Patient monitoring: frequent blood pressure checks, continuous pulse ox and heart rate  Approach: midline  Location: L3-4  Needle  Needle type: Pencan   Needle gauge: 24 G  Needle length: 4 in  Assessment  Sensory level: T4  Injection Assessment:  negative aspiration for heme, no paresthesia on injection and positive aspiration for clear CSF.  Post-procedure:  site cleaned

## 2024-05-13 NOTE — PLAN OF CARE
Problem: Knowledge Deficit  Goal: Patient/family/caregiver demonstrates understanding of disease process, treatment plan, medications, and discharge instructions  Description: Complete learning assessment and assess knowledge base.  Interventions:  - Provide teaching at level of understanding  - Provide teaching via preferred learning methods  Outcome: Completed  Goal: Verbalizes understanding of labor plan  Description: Assess patient/family/caregiver's baseline knowledge level and ability to understand information.  Provide education via patient/family/caregiver's preferred learning method at appropriate level of understanding.     1. Provide teaching at level of understanding.  2. Provide teaching via preferred learning method(s).  Outcome: Completed     Problem: Labor & Delivery  Goal: Manages discomfort  Description: Assess and monitor for signs and symptoms of discomfort.  Assess patient's pain level regularly and per hospital policy.  Administer medications as ordered. Support use of nonpharmacological methods to help control pain such as distraction, imagery, relaxation, and application of heat and cold.  Collaborate with interdisciplinary team and patient to determine appropriate pain management plan.    1. Include patient in decisions related to comfort.  2. Offer non-pharmacological pain management interventions.  3. Report ineffective pain management to physician.  Outcome: Completed  Goal: Patient vital signs are stable  Description: 1. Assess vital signs - vaginal delivery.  Outcome: Completed     Problem: BIRTH - VAGINAL/ SECTION  Goal: Fetal and maternal status remain reassuring during the birth process  Description: INTERVENTIONS:  - Monitor vital signs  - Monitor fetal heart rate  - Monitor uterine activity  - Monitor labor progression (vaginal delivery)  - DVT prophylaxis  - Antibiotic prophylaxis  Outcome: Completed  Goal: Emotionally satisfying birthing experience for  mother/fetus  Description: Interventions:  - Assess, plan, implement and evaluate the nursing care given to the patient in labor  - Advocate the philosophy that each childbirth experience is a unique experience and support the family's chosen level of involvement and control during the labor process   - Actively participate in both the patient's and family's teaching of the birth process  - Consider cultural, Congregation and age-specific factors and plan care for the patient in labor  Outcome: Completed

## 2024-05-13 NOTE — DISCHARGE SUMMARY
"Discharge Summary   Jules Sykes MRN: [id number]  Unit/Bed#: LD TRIAGE 5- Encounter: [id number]      Admission Date: 2024     Discharge Date: 24    Attending: Santhosh Parrish MD    Principal Diagnosis: preeclampsia with severe features    Procedures:   1CCS    Hospital course:   Jules Sykes is a 23 yo  who was initially admitted at 31w0d for a new diagnosis of preeclampsia with severe features.  Her severe features was sustained severe-range BP requiring acute treatment with Procardia IR 10 mg PO, Labetalol 20/40/80 mg IV, and Hydralazine 5 mg IV.  CBC and CMP were normal on admission, but UP:C was elevated at 9.33.  She received Betamethasone for fetal lung maturation.  She received magnesium for maternal seizure prophylaxis and for fetal neuroprotection.  Her postpartum course was complicated by multiple titrations of antihypertensive medication and an elevated EPDS of 15. She was discharged home on POD5. At that time she denied any SI/HI and expressed having a good support system at home. She was sent home with prescription for Procardia XL 90 mg.     Lab Results:   Lab Results   Component Value Date    WBC 10.77 (H) 2024    HGB 11.6 2024    HCT 36.3 2024    MCV 86 2024     2024     Lab Results   Component Value Date    GLUCOSE  2019      Comment:      This is a corrected result. Previous result was 108 mg/dl on 2019 at 1642 EDT    CALCIUM 8.3 (L) 2024    K 4.7 2024    CO2 23 2024     2024    BUN 16 2024    CREATININE 0.83 2024     No results found for: \"POCGLU\"  No results found for: \"PTT\"  No results found for: \"INR\", \"PROTIME\"    Complications: none apparent    Condition at discharge: stable     Discharge instructions/Information to patient and family:   See After Visit Summary for information provided to patient and family.      Provisions for Follow-Up Care:  See After Visit Summary for " information related to follow-up care and any pertinent home health orders.      Disposition: See After Visit Summary for discharge disposition information.    Planned Readmission: Yes, pending clinical status    Discharge Medications:  For a complete list of the patient's medications, please refer to her med rec.    Magaly Calvert MD  PGY-1 OBGYN

## 2024-05-13 NOTE — ASSESSMENT & PLAN NOTE
Fetal monitoring non-reactive on admission with fetal tachycardia with baseline of 165 bpm and minimal variability with no accelerations or decelerations.     Continuous monitoring during maternal stabilization  Consider DALJIT when maternal status stabilized

## 2024-05-13 NOTE — PROGRESS NOTES
Reviewed FHT with Dr. Parrish and Dr. Fonseca.  Persistent decelerations ongoing (suspect late as they are likely due to placental insufficiency, though no contractions are noted subjectively by the patient or objectively on toco).  Minimal variability noted.  Baseline 125 bpm with no accelerations.  FHT remains this way s/p 1L IVF and maternal repositioning.  Given persistent category 2 FHT, we recommend proceeding with delivery via .  Vision was counseled on the risk of possible classical  and how that would mean she could not have vaginal deliveries in the future and that any future deliveries would have to be via RLTCS.  She thinks she wants 1 or 2 at most more children, and we reviewed that though there is increased risk of scar tissue and associated injuries, having this many c-sections is feasible.  Vision is amenable with the plan as stated.    Will proceed with delivery via 1LTCS vs. Classical   NPO  Ancef 2 g IV ordered  SCDs for DVT ppx  Anesthesia and NICU aware    Reviewed w/ Dr. Fonseca and Dr. Parrish.    Maribel Rodriguez MD  OBGYN Resident  24  4:03 AM

## 2024-05-13 NOTE — DISCHARGE INSTRUCTIONS
Self Care After Delivery   AMBULATORY CARE:   The postpartum period is the period of time from delivery to about 6 weeks. During this time you may experience many physical and emotional changes. It is important to understand what is normal and when you need to call your healthcare provider. It is also important to know how to care for yourself during this time.  Call your local emergency number (911 in the [place]) for any of the following:   You see or hear things that are not there, or have thoughts of harming yourself or your baby.     You soak through 1 pad in 15 minutes, have blurry vision, clammy or pale skin, and feel faint.     You faint or lose consciousness.     You have trouble breathing.     You cough up blood.     Your  incision comes apart.     Seek care immediately if:   Your heart is beating faster than usual.     You have a bad headache or changes in your vision.     Your perineal tear, episiotomy site, or  incision is red, swollen, bleeding, or draining pus.     You have severe abdominal pain.     Call your doctor or obstetrician if:   Your leg is painful, red, and larger than usual.     You soak through 1 or more pads in an hour, or pass blood clots larger than a quarter from your vagina.     You have a fever.     You have new or worsening pain in your abdomen or vagina.     You continue to have depression 1 to 2 weeks after you deliver.     You have trouble sleeping.     You have foul-smelling discharge from your vagina.     You have pain or burning when you urinate.     You do not have a bowel movement for 3 days or more.     You have nausea or are vomiting.     You have hard lumps or red streaks over your breasts.     You have cracked nipples or bleed from your nipples.     You have questions or concerns about your condition or care.     Physical changes: The following are normal changes after you give birth:  Pain in the area between your anus and vagina     Breast pain      Constipation or hemorrhoids     Hot or cold flashes     Vaginal bleeding or discharge     Mild to moderate abdominal cramping     Difficulty controlling bowel movements or urine     Emotional changes: A drop in hormone levels after you deliver may cause changes in your emotions. You may feel irritable, sad, or anxious. You may cry easily or for no reason. You may also feel depressed. Depression that continues can be a sign of postpartum depression, a condition that can be treated. Treatment may include talk therapy, medicines, or both. Healthcare providers will ask how you are feeling and if you have any depression. These talks can happen during appointments for your medical care and for your baby's care, such as well child visits. Providers can help you find ways to care for yourself and your baby. Talk to your providers about the following:  When emotional changes or depression started, and if it is getting worse over time     Problems you are having with daily activities, sleep, or caring for your baby     If anything makes you feel worse, or makes you feel better     Feeling that you are not bonding with your baby the way you want     Any problems your baby has with sleeping or feeding     Your baby is fussy or cries a lot     Support you have from friends, family, or others     Breast care for breastfeeding mothers: You may have sore breasts for 3 to 6 days after you give birth. This happens as your milk begins to fill your breasts. You may also have sore breasts if you do not breastfeed frequently. Do the following to care for your breasts:  Apply a moist, warm, compress to your breast as directed. This may help soothe your breasts. Make sure the washcloth is not too hot before you apply it to your breast.     Nurse your baby or pump your milk frequently. This may prevent clogged milk ducts. Ask your healthcare provider how often to nurse or pump.     Massage your breasts as directed. This may help increase  your milk flow. Gently rub your breasts in a circular motion before you breastfeed. You may need to gently squeeze your breast or nipple to help release milk. You can also use a breast pump to help release milk from your breast.     Wash your breasts with warm water only. Do not put soap on your nipples. Soap may cause your nipples to become dry.     Apply lanolin cream to your nipples as directed. Lanolin cream may add moisture to your skin and prevent nipple dryness. Always wash off lanolin cream with warm water before you breastfeed.     Place pads in your bra. Your nipples may leak milk when you are not breastfeeding. You can place pads inside of your bra to help prevent leaking onto your clothing. Ask your healthcare provider where to purchase bra pads.     Get breastfeeding support if needed. Healthcare providers can answer questions about breastfeeding and provide you with support. Ask your healthcare provider who you can contact if you need breastfeeding support.     Breast care for non-breastfeeding mothers: Milk will fill your breasts even if you bottle feed your baby. Do the following to help stop your milk from filling your breasts and causing pain:  Wear a bra with support at all times. A sports bra or a tight-fitting bra will help stop your milk from coming in.     Apply ice on each breast for 15 to 20 minutes every hour or as directed. Use an ice pack, or put crushed ice in a plastic bag. Cover it with a towel before you apply it to your breast. Ice helps your milk ducts shrink.     Keep your breasts away from warm water. Warm water will make it easier for milk to fill your breasts. Stand with your breasts away from warm water in the shower.     Limit how much you touch your breasts. This will prevent them from filling with milk.     Perineum care: Your perineum is the area between your rectum and vagina. It is normal to have swelling and pain in this area after you give birth. If you had an  episiotomy, your healthcare provider may give you special instructions.  Clean your perineum after you use the bathroom. This may prevent infection and help with healing. Use a spray bottle with warm water to clean your perineum. You may also gently spray warm water against your perineum when you urinate. Always wipe front to back.     Take a sitz bath as directed. A sitz bath may help relieve swelling and pain. Fill your bath tub or bucket with water up to your hips and sit in the water. Use cold water for 2 days after you deliver. Then use warm water. Ask your healthcare provider for more information about a sitz bath.     Apply ice packs for the first 24 hours or as directed. Use a plastic glove filled with ice or buy an ice pack. Wrap the ice pack or plastic glove in a small towel or wash cloth. Place the ice pack on your perineum for 20 minutes at a time.     Sit on a donut-shaped pillow. This may relieve pressure on your perineum when you sit.     Use wipes that contain medicine or take pills as directed. Your healthcare provider may tell you to use witch hazel pads. You can place witch hazel pads in the refrigerator before you apply them to your perineum. Your provider may also tell you to take NSAIDs. Ask him or her how often to take pills or use the wipes.     Do not go swimming or take tub baths for 4 to 6 weeks or as directed. This will help prevent an infection in your vagina or uterus.     Bowel and bladder care: It may take 3 to 5 days to have a bowel movement after you deliver your baby. You can do the following to prevent or manage constipation, and get control of your bowel or bladder:  Take stool softeners as directed. A stool softener is medicine that will make your bowel movements softer. This may prevent or relieve constipation. A stool softener may also make bowel movements less painful.     Drink plenty of liquids. Ask how much liquid to drink each day and which liquids are best for you.  Liquids may help prevent constipation.     Eat foods high in fiber. Examples include fruits, vegetables, grains, beans, and lentils. Ask your healthcare provider how much fiber you need each day. Fiber may prevent constipation.          Do Kegel exercises as directed. Kegel exercises will help strengthen the muscles that control bowel movements and urination. Ask your healthcare provider for more information on Kegel exercises.     Apply cold compresses or medicine to hemorrhoids as directed. This may relieve swelling and pain. Your healthcare provider may tell you to apply ice or wipes that contain medicine to your hemorrhoids. He or she may also tell you to use a sitz bath. Ask your provider for more information on how to manage hemorrhoids.     Nutrition: Good nutrition is important in the postpartum period. It will help you return to a healthy weight, increase your energy levels, and prevent constipation. It will also help you get enough nutrients and calories if you are going to breastfeed your baby.  Eat a variety of healthy foods. Healthy foods include fruits, vegetables, whole-grain breads, low-fat dairy products, beans, lean meats, and fish. You may need 500 to 700 extra calories each day if you breastfeed your baby. You may also need extra protein.          Limit foods with added sugar and high amounts of fat. These foods are high in calories and low in healthy nutrients. Read food labels so you know how much sugar and fat is in the food you want to eat.     Drink 8 to 10 glasses of water per day. Water will help you make plenty of milk for your baby. It will also help prevent constipation. Drink a glass of water every time you breastfeed your baby.     Take vitamins as directed. Ask your healthcare provider what vitamins you need.     Limit caffeine and alcohol if you are breastfeeding. Caffeine and alcohol can get into your breast milk. Caffeine and alcohol can make your baby fussy. They can also  interfere with your baby's sleep. Ask your healthcare provider if you can drink alcohol or caffeine.     Rest and sleep: You may feel very tired in the postpartum period. Enough sleep will help you heal and give you energy to care for your baby. The following may help you get sleep and rest:  Nap when your baby naps. Your baby may nap several times during the day. Get rest during this time.     Limit visitors. Many people may want to see you and your baby. Ask friends or family to visit on different days. This will give you time to rest.     Do not plan too much for one day. Put off household chores so that you have time to rest. Gradually do more each day.     Ask for help from family, friends, or neighbors. Ask them to help you with laundry, cleaning, or errands. Also ask someone to watch the baby while you take a nap or relax. Ask your partner to help with the care of your baby. Pump some of your breast milk so your partner can feed your baby during the night.     Exercise after delivery: Wait until your healthcare provider says it is okay to exercise. Exercise can help you lose weight, increase your energy levels, and manage your mood. It can also prevent constipation and blood clots. Start with gentle exercises such as walking. Do more as you have more energy. You may need to avoid abdominal exercises for 1 to 2 weeks after you deliver. Talk to your healthcare provider about an exercise plan that is right for you.     Sexual activity after delivery:   Do not have sex until your healthcare provider says it is okay. You may need to wait 4 to 6 weeks before you have sex. This may prevent infection and allow time to heal.     Your menstrual cycle may begin as soon as 3 weeks after you deliver. Your period may be delayed if you breastfeed your baby. You can become pregnant before you get your first postpartum period. Talk to your healthcare provider about birth control that is right for you. Some types of birth  control are not safe during breastfeeding.     For support and more information: Join a support group for new mothers. Ask for help from family and friends with chores, errands, and care of your baby.  Office of Women's Health,  Department of Health and Human Services  [address] [place] [address]  [address] [place] [address]  Phone: [phone]  Web Address: www.womenshealth.gov  [facility]partum [facility]  [address] [place] [address]  Web Address: [email or web address]  Follow up with your doctor or obstetrician as directed: You will need to follow up within 2 to 6 weeks of delivery. Write down your questions so you remember to ask them at your visits.  © Copyright [employer] 2020 Information is for End User's use only and may not be sold, redistributed or otherwise used for commercial purposes. All illustrations and images included in CareNotes® are the copyrighted property of [employer] or "[employer]"  The above information is an  only. It is not intended as medical advice for individual conditions or treatments. Talk to your doctor, nurse or pharmacist before following any medical regimen to see if it is safe and effective for you.

## 2024-05-13 NOTE — PROGRESS NOTES
Fetal monitoring s/p 1L IVF is showing normal baseline of 125 bpm, minimal to moderate variability (primarily minimal with short periods of moderate), and less frequent decelerations at this time (irregular presumably late decelerations, though no contractions noted) with no accelerations.  Will monitor closely for frequency of decelerations. If spacing, can continue to monitor. If more frequent, will consider delivery for fetal status.  BP remain elevated but not severe which we will tolerate in favor of fetal perfusion.  Will address and treat BP again if severe.  Continuous monitoring ongoing.  NPO status ongoing until fetal status is more reassuring and we have less concern for possible delivery.  Mag ongoing.  Q6h labs ongoing and thus far normal.  Patient's headache improved s/p cocktail with Benadryl, Reglan, and Tylenol, though patient did have restless legs w/ Reglan.    Dr. Parrish aware.    Maribel Rodriguez MD  OBGYN Resident  05/13/24  3:04 AM

## 2024-05-13 NOTE — LACTATION NOTE
CONSULT - LACTATION  Vision Greg Sykes 24 y.o. female MRN: [id number]    On license of [facility] AN L&D Room / Bed:  323/ 323-01 Encounter: [id number]    Maternal Information     MOTHER:  N/A  Maternal Age: This patient's mother is not on file.  OB History: This patient's mother is not on file.  Previouse breast reduction surgery? No    Lactation history:   Has patient previously breast fed: No   How long had patient previously breast fed:     Previous breast feeding complications:     This patient's mother is not on file.    Birth information:  YOB: 2000   Time of birth:     Sex: female   Delivery type:     Birth Weight: No birth weight on file.   Percent of Weight Change: Birth weight not on file     Gestational Age: <None>   [unfilled]    Assessment     Breast and nipple assessment: large breast    [place] Assessment:  Baby in NICU      24 1300   Lactation Consultation   Reason for Consult 20;15 min;10 minute   Lactation Consultant Total Time 45   Risk Factors NICU infant   Maternal Information   Has mother  before? No   Breasts/Nipples   Date Pumping Initiated 24   Time Pumping Initiated 1320   Left Breast Soft;Other (Comment)  (large breasts; swollen)   Right Breast Soft;Other (Comment)  (large breasts; swollen)   Left Nipple Everted   Right Nipple Everted   Intervention Hand expression;Breast pump   Breastfeeding Status Yes   Breastfeeding Progress Pumping only   Reasons for not Breastfeeding Infant medical condition   Other OB Lactation Tools   Feeding Devices Pump   Breast Pump   Pump 3;2;1  (CM Consult for pump)   Pump Review/Education Setup, frequency, and cleaning;Milk storage   Initiated by JOSHUA Mayberry   Date Initiated 24   Patient Follow-Up   Lactation Consult Status 2   Follow-Up Type Inpatient;Call as needed   Other OB Lactation Documentation    Additional Problem Noted Set mom up to pump for baby in NICU. Cycled through a  pumping session with mom. Education on how to collect colostrum. Educ on breast milk storage guidelines. Enc to call for further assistance.  (RSB and DC booklet reviewed)       Feeding recommendations:  pump every 2-3 hours    Mom provided with and discussed RBS, Hand expression/2nd night handout and increase supply for NICU baby. Reviewed pumping log and expectations for pumping output in the first week. Reviewed cycle pumping and appropriate pump settings, as well as pumping for 10-15 min 8-12 times per day.       Enc Mom to discussed putting baby to the breast with the NICU team when baby is medically stable to do so. Enc her to call for lactation support as needed throughout her stay.     Met with mother to go over discharge breastfeeding booklet including the feeding log. Emphasized 8 or more (12) feedings in a 24 hour period, what to expect for the number of diapers per day of life and the progression of properties of the  stooling pattern.    List of reasons to call a lactation consultant.  Feeding logs  Feeding cues  Hand expression  Baby's Second day (cluster feeding)  Breastfeeding and Your Lifestyle (Medications, Alcohol, Caffeine, Smoking, Street Drugs, Methadone)  First Two Weeks Survival Guide for Breastfeeding  Breast Changes  Physical Therapy  Storage and Handling of Breast milk  How to Keep Your Breast Pump Kit Clean  The Employed Breastfeeding Mother  Mixed feeding  Bottle feeding like breastfeeding (paced bottle feeding)  astfeeding and your lifestyle, storage and preparation of breast milk, how to keep you breast pump clean, the employed breastfeeding mother and paced bottle feeding handouts.     Booklet included Breastfeeding Resources for after discharge including access to the number for the [facility].      Jacqueline Trejo 2024 1:43 PM

## 2024-05-13 NOTE — ANESTHESIA PREPROCEDURE EVALUATION
Procedure:   SECTION () (Uterus)    Relevant Problems   CARDIO   (+) Preeclampsia      GYN   (+) 31 weeks gestation of pregnancy      NEURO/PSYCH   (+) Depression   (+) Generalized anxiety disorder       Latest Reference Range & Units 24 20:09   WBC 4.31 - 10.16 Thousand/uL 10.77 (H)   RBC 3.81 - 5.12 Million/uL 4.20   Hemoglobin 11.5 - 15.4 g/dL 11.6   Hematocrit 34.8 - 46.1 % 36.3   MCV 82 - 98 fL 86   MCH 26.8 - 34.3 pg 27.6   MCHC 31.4 - 37.4 g/dL 32.0   RDW 11.6 - 15.1 % 15.5 (H)   Platelet Count 149 - 390 Thousands/uL 207   MPV 8.9 - 12.7 fL 12.6   (H): Data is abnormally high     Latest Reference Range & Units 24 20:09   Sodium 135 - 147 mmol/L 138   Potassium 3.5 - 5.3 mmol/L 4.7   Chloride 96 - 108 mmol/L 108   Carbon Dioxide 21 - 32 mmol/L 23   ANION GAP 4 - 13 mmol/L 7   BUN 5 - 25 mg/dL 16   Creatinine 0.60 - 1.30 mg/dL 0.83   GLUCOSE 65 - 140 mg/dL 79     Physical Exam    Airway    Mallampati score: II  TM Distance: >3 FB  Neck ROM: full     Dental   No notable dental hx     Cardiovascular      Pulmonary      Other Findings  post-pubertal.      Anesthesia Plan  ASA Score- 3     Anesthesia Type- spinal with ASA Monitors.         Additional Monitors:     Airway Plan:            Plan Factors-Exercise tolerance (METS): >4 METS.    Chart reviewed.   Existing labs reviewed. Patient summary reviewed.    Patient is not a current smoker.  Patient did not smoke on day of surgery.            Induction-     Postoperative Plan- Plan for postoperative opioid use.     Informed Consent- Anesthetic plan and risks discussed with patient.  I personally reviewed this patient with the CRNA. Discussed and agreed on the Anesthesia Plan with the CRNA..

## 2024-05-13 NOTE — H&P
H & P- Obstetrics   Vision Greg Sykes 24 y.o. female MRN: [id number]  Unit/Bed#: LD TRIAGE  Encounter: [id number]    Assessment: 24 y.o.  at 31w0d admitted for preeclampsia with severe features.    Plan:   * Preeclampsia  Assessment & Plan  Patient presented with DFM then had sustained SRBP in L&D triage and thereby met criteria for preE w/ SF.  S/p Procardia IR 10 mg PO, Labetalol 20/40/80 mg IV, and Hydralazine 5 mg IV in L&D triage with ongoing monitoring of BP to determine if BP will stabilize or not.    Systolic (24hrs), Av , Min:160 , Max:196   Diastolic (24hrs), Av, Min:93, Max:111    CBC & CMP wnl  UP:C 9.33    Monitor BP; may consider emergent delivery if unable to control BP with ongoing acute treatment  Magnesium ordered for seizure prophylaxis (will have dual benefit of fetal neuroprotection)  Labs q6h while on magnesium  Betamethasone ordered for fetal lung maturation  NPO in case emergent delivery via  is indicated for unstable maternal status, though otherwise if stabilized, patient would be a candidate for vaginal delivery at 34w0d  Monitoring: continuous  DVT ppx: SCDs    Rubella nonimmune status, delivered, current hospitalization  Assessment & Plan  Recommend vaccination postpartum     Nonimmune to hepatitis B virus  Assessment & Plan  Recommend vaccination antepartum after patient is stabilized     Decreased fetal movement  Assessment & Plan  Fetal monitoring non-reactive on admission with fetal tachycardia with baseline of 165 bpm and minimal variability with no accelerations or decelerations.     Continuous monitoring during maternal stabilization  Consider DALJIT when maternal status stabilized    Insufficient prenatal care in second trimester  Assessment & Plan  No routine OB care from 20w to 29w1d.     Consider case management consult postpartum vs. antepartum    Depression  Assessment & Plan  Home Zoloft ordered    Generalized anxiety disorder  Assessment &  "Plan  Home Zoloft ordered      Discussed case and plan w/ Dr. Parrish    Chief Complaint: decreased fetal movement    HPI: Vision Greg Sykes is a 24 y.o.  with an BRENNA of 2024, by Last Menstrual Period at 31w0d who is being admitted for preeclampsia with severe features.  She initially presented for decreased fetal movement after not feeling her baby move all day.  She denies cramping, contractions, vaginal bleeding, or leaking fluid.  She denies headache but says her head feels \"heavy.\"  She endorses blurry vision but doesn't have her glasses with her.  She had diarrhea, chest pain, and SOB yesterday, all of which are resolved today.  She denies abdominal pain or swelling increased from her baseline this pregnancy (does note her face and legs feels swollen but is clear that this has been for her entire pregnancy).    Patient Active Problem List   Diagnosis    Myopia    Flexural eczema    Generalized anxiety disorder    Depression    Herniated lumbar intervertebral disc    31 weeks gestation of pregnancy    History of Kawasaki's disease    Preeclampsia    Yeast infection    Back pain affecting pregnancy in second trimester    Abdominal pain    Insufficient prenatal care in second trimester    Decreased fetal movement     Baby complications/comments: none    Review of Systems   Constitutional:  Negative for chills and fever.   Eyes:  Negative for visual disturbance.   Respiratory:  Positive for shortness of breath. Negative for cough.    Cardiovascular:  Positive for chest pain and leg swelling.   Gastrointestinal:  Positive for diarrhea. Negative for abdominal pain, nausea and vomiting.   Genitourinary:  Negative for dysuria, frequency, hematuria, pelvic pain, urgency, vaginal bleeding and vaginal discharge.   Neurological:  Negative for dizziness, light-headedness and headaches.       OB Hx:  OB History    Para Term  AB Living   1 0 0 0 0 0   SAB IAB Ectopic Multiple Live Births   0 0 0 " 0 0      # Outcome Date GA Lbr Anthony/2nd Weight Sex Delivery Anes PTL Lv   1 Current                Past Medical Hx:  Past Medical History:   Diagnosis Date    Anxiety     Depression 02/01/2022    Herniated disc, cervical     9/2022    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HCC)     around age 3 or 4 - has EKG done every 2 years    Memory changes 08/10/2018       Past Surgical hx:  Past Surgical History:   Procedure Laterality Date    NO PAST SURGERIES         Social Hx:  Alcohol use: no  Tobacco use: no  Other substance use: no  Other: N/A    No Known Allergies    Medications Prior to Admission   Medication    Prenatal 27-1 MG TABS    prenatal vitamin (CLASSIC FORMULA) 27-0.8 mg    sertraline (ZOLOFT) 100 mg tablet       Objective:    Vitals:  Temp:  [99.8 °F (37.7 °C)] 99.8 °F (37.7 °C)  Resp:  [18] 18  Body mass index is 41.86 kg/m².     Physical Exam:  Physical Exam  Constitutional:       General: She is not in acute distress.     Appearance: Normal appearance. She is not ill-appearing.   HENT:      Mouth/Throat:      Mouth: Mucous membranes are moist.   Eyes:      Extraocular Movements: Extraocular movements intact.   Cardiovascular:      Rate and Rhythm: Normal rate and regular rhythm.      Heart sounds: Normal heart sounds.   Pulmonary:      Effort: Pulmonary effort is normal.      Breath sounds: Normal breath sounds.   Abdominal:      General: There is no distension.      Palpations: Abdomen is soft.      Tenderness: There is no abdominal tenderness. There is no guarding.   Musculoskeletal:         General: No swelling or tenderness.      Right lower leg: No edema.      Left lower leg: No edema.   Neurological:      General: No focal deficit present.      Mental Status: She is alert.   Skin:     General: Skin is warm and dry.      Coloration: Skin is not jaundiced or pale.   Psychiatric:         Mood and Affect: Mood normal.         Behavior: Behavior normal.         Thought Content: Thought content normal.         Judgment:  Judgment normal.        SVE: deferred    FHT:  Baseline: 165 bpm  Variability: minimal  Accelerations: absent  Decelerations: absent  NST non-reactive    TOCO:   No contractions    Lab Results   Component Value Date    WBC 10.80 (H) 04/29/2024    HGB 11.0 (L) 04/29/2024    HCT 35.2 04/29/2024     04/29/2024     Lab Results   Component Value Date    K 4.0 04/29/2024     04/29/2024    CO2 25 04/29/2024    BUN 14 04/29/2024    CREATININE 0.70 04/29/2024    GLUCOSE  05/21/2019      Comment:      This is a corrected result. Previous result was 108 mg/dl on 5/21/2019 at 1642 EDT    AST 16 04/29/2024    ALT 16 04/29/2024     Prenatal Labs: Reviewed      Blood type: AB positive  Antibody: negative  GBS: pending (collected on admission)  HIV: non-reactive  Rubella: non-immune  Syphilis IgM/IgG: non-reactive  HBsAb: non-immune  HBsAg: non-reactive  HCAb: non-reactive  Chlamydia: negative  Gonorrhea: negative  Diabetes 1 hour screen: 102  3 hour glucose: not indicated  Platelets: 238  Hgb: 11.0    >2 Midnights  INPATIENT         Signature/Title: Maribel Rodriguez MD  Date: 5/12/2024  Time: 8:13 PM

## 2024-05-13 NOTE — PLAN OF CARE
Problem: PAIN - ADULT  Goal: Verbalizes/displays adequate comfort level or baseline comfort level  Description: Interventions:  - Encourage patient to monitor pain and request assistance  - Assess pain using appropriate pain scale  - Administer analgesics based on type and severity of pain and evaluate response  - Implement non-pharmacological measures as appropriate and evaluate response  - Consider cultural and social influences on pain and pain management  - Notify physician/advanced practitioner if interventions unsuccessful or patient reports new pain  Outcome: Progressing       Problem: INFECTION - ADULT  Goal: Absence or prevention of progression during hospitalization  Description: INTERVENTIONS:  - Assess and monitor for signs and symptoms of infection  - Monitor lab/diagnostic results  - Monitor all insertion sites, i.e. indwelling lines, tubes, and drains  - Monitor endotracheal if appropriate and nasal secretions for changes in amount and color  - [place] appropriate cooling/warming therapies per order  - Administer medications as ordered  - Instruct and encourage patient and family to use good hand hygiene technique  - Identify and instruct in appropriate isolation precautions for identified infection/condition  Outcome: Progressing  Goal: Absence of fever/infection during neutropenic period  Description: INTERVENTIONS:  - Monitor WBC    Outcome: Progressing     Problem: SAFETY ADULT  Goal: Patient will remain free of falls  Description: INTERVENTIONS:  - Educate patient/family on patient safety including physical limitations  - Instruct patient to call for assistance with activity   - Consult OT/PT to assist with strengthening/mobility   - Keep Call bell within reach  - Keep bed low and locked with side rails adjusted as appropriate  - Keep care items and personal belongings within reach  - Initiate and maintain comfort rounds  - Make Fall Risk Sign visible to staff  - Offer Toileting every  Hours,  in advance of need  - Initiate/Maintain alarm  - Obtain necessary fall risk management equipment:   - Apply yellow socks and bracelet for high fall risk patients  - Consider moving patient to room near nurses station  Outcome: Progressing  Goal: Maintain or return to baseline ADL function  Description: INTERVENTIONS:  -  Assess patient's ability to carry out ADLs; assess patient's baseline for ADL function and identify physical deficits which impact ability to perform ADLs (bathing, care of mouth/teeth, toileting, grooming, dressing, etc.)  - Assess/evaluate cause of self-care deficits   - Assess range of motion  - Assess patient's mobility; develop plan if impaired  - Assess patient's need for assistive devices and provide as appropriate  - Encourage maximum independence but intervene and supervise when necessary  - Involve family in performance of ADLs  - Assess for home care needs following discharge   - Consider OT consult to assist with ADL evaluation and planning for discharge  - Provide patient education as appropriate  Outcome: Progressing  Goal: Maintains/Returns to pre admission functional level  Description: INTERVENTIONS:  - Perform AM-PAC 6 Click Basic Mobility/ Daily Activity assessment daily.  - Set and communicate daily mobility goal to care team and patient/family/caregiver.   - Collaborate with rehabilitation services on mobility goals if consulted  - Perform Range of Motion  times a day.  - Reposition patient every  hours.  - Dangle patient  times a day  - Stand patient  times a day  - Ambulate patient  times a day  - Out of bed to chair  times a day   - Out of bed for meals times a day  - Out of bed for toileting  - Record patient progress and toleration of activity level   Outcome: Progressing     Problem: Knowledge Deficit  Goal: Patient/family/caregiver demonstrates understanding of disease process, treatment plan, medications, and discharge instructions  Description: Complete learning  assessment and assess knowledge base.  Interventions:  - Provide teaching at level of understanding  - Provide teaching via preferred learning methods  Outcome: Progressing  Goal: Verbalizes understanding of labor plan  Description: Assess patient/family/caregiver's baseline knowledge level and ability to understand information.  Provide education via patient/family/caregiver's preferred learning method at appropriate level of understanding.     1. Provide teaching at level of understanding.  2. Provide teaching via preferred learning method(s).  Outcome: Progressing     Problem: DISCHARGE PLANNING  Goal: Discharge to home or other facility with appropriate resources  Description: INTERVENTIONS:  - Identify barriers to discharge w/patient and caregiver  - Arrange for needed discharge resources and transportation as appropriate  - Identify discharge learning needs (meds, wound care, etc.)  - Arrange for interpretive services to assist at discharge as needed  - Refer to Case Management Department for coordinating discharge planning if the patient needs post-hospital services based on physician/advanced practitioner order or complex needs related to functional status, cognitive ability, or social support system  Outcome: Progressing     Problem: Labor & Delivery  Goal: Manages discomfort  Description: Assess and monitor for signs and symptoms of discomfort.  Assess patient's pain level regularly and per hospital policy.  Administer medications as ordered. Support use of nonpharmacological methods to help control pain such as distraction, imagery, relaxation, and application of heat and cold.  Collaborate with interdisciplinary team and patient to determine appropriate pain management plan.    1. Include patient in decisions related to comfort.  2. Offer non-pharmacological pain management interventions.  3. Report ineffective pain management to physician.  Outcome: Progressing  Goal: Patient vital signs are  stable  Description: 1. Assess vital signs - vaginal delivery.  Outcome: Progressing     Problem: BIRTH - VAGINAL/ SECTION  Goal: Fetal and maternal status remain reassuring during the birth process  Description: INTERVENTIONS:  - Monitor vital signs  - Monitor fetal heart rate  - Monitor uterine activity  - Monitor labor progression (vaginal delivery)  - DVT prophylaxis  - Antibiotic prophylaxis  Outcome: Progressing  Goal: Emotionally satisfying birthing experience for mother/fetus  Description: Interventions:  - Assess, plan, implement and evaluate the nursing care given to the patient in labor  - Advocate the philosophy that each childbirth experience is a unique experience and support the family's chosen level of involvement and control during the labor process   - Actively participate in both the patient's and family's teaching of the birth process  - Consider cultural, Jehovah's witness and age-specific factors and plan care for the patient in labor  Outcome: Progressing

## 2024-05-13 NOTE — CONSULTS
NICU Prenatal Consult   Vision Greg Sykes 24 y.o. female MRN: [id number]  Unit/Bed#:  TRIAGE  Encounter: [id number]    Consulting Physician: Santhosh Parrish MD      A NICU Prenatal Consult has been requested by Dr Parrish due to Pre-eclampsia with SF, decreased fetal movement      Vision Greg Sykes is a 24 y.o. , with an BRENNA of 2024  at 31w0d GA     Vision is expecting a daughter , to be named Katelynn  Estimated fetal weight is 1 lbs 10 oz at 24 weeks gestation (as of 3/29/2024). She intends to breastfeed agrees to DBM as bridge feed.     Along with Vision , FOB Doc Pickett, her significant other was present for the consultation.     Prenatal Care:Yes, see Presenting Problem above    Prenatal Labs:  Lab Results   Component Value Date/Time    ABO Grouping AB 2023 05:46 PM    Rh Factor Positive 2023 05:46 PM    Hepatitis B Surface Ag Non-reactive 2023 05:46 PM    Hepatitis C Ab Non-reactive 2023 05:46 PM    Rubella IgG Quant 13.7 (L) 2023 05:46 PM    HIV-1/HIV-2 Ab Non-Reactive 2022 05:08 PM   Prenatal Labs: Reviewed      Blood type: AB positive  Antibody: negative  GBS: pending (collected on admission)  HIV: non-reactive  Rubella: non-immune  Syphilis IgM/IgG: non-reactive  HBsAb: non-immune  HBsAg: non-reactive  HCAb: non-reactive  Chlamydia: negative  Gonorrhea: negative  Diabetes 1 hour screen: 102  3 hour glucose: not indicated  Platelets: 238  Hgb: 11.0    Unknown labs at this time: GBS culture     Pregnancy complications:   Patient Active Problem List   Diagnosis    Myopia    Flexural eczema    Generalized anxiety disorder    Depression    Herniated lumbar intervertebral disc    31 weeks gestation of pregnancy    History of Kawasaki's disease    Preeclampsia    Back pain affecting pregnancy in second trimester    Insufficient prenatal care in second trimester    Decreased fetal movement     Maternal medical history:   Past Medical History:    Diagnosis Date    Anxiety     Depression 2022    Herniated disc, cervical     2022    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HCC)     around age 3 or 4 - has EKG done every 2 years    Memory changes 08/10/2018     Medications at home:   Medications Prior to Admission   Medication    Prenatal 27-1 MG TABS    prenatal vitamin (CLASSIC FORMULA) 27-0.8 mg    sertraline (ZOLOFT) 100 mg tablet     Maternal social history:  Social History     Tobacco Use    Smoking status: Never    Smokeless tobacco: Never   Substance Use Topics    Alcohol use: Not Currently     Alcohol/week: 1.0 standard drink of alcohol     Types: 1 Standard drinks or equivalent per week     Comment: social     Maternal  medications: BTM  at  for fetal lung maturation , Magnesium sulfate for Pre-eclampsia-seizure precaution and benefit of fetal neuroprotection   S/p Procardia IR 10 mg PO, Labetalol 20/40/80 mg IV, and Hydralazine 5 mg IV in L&D triage with ongoing monitoring of BP to determine if BP will stabilize or not.     I spoke with Jules Greg Sykes today regarding the potential upcoming birth of her Daughter .  We discussed the baby's possible medical problems and the specialized care needed to address these problems.  This discussion included, but was not limited to:     Attendance of physician/SERGE/ nursing, and/or respiratory therapist at the delivery and delivery room management , Possible need for IV access, umbilical lines, and/or PICC lines, Possible need for prolonged parenteral nutrition, Potential need for transfusion of blood products, Risk of feedings problems and the potential need for IV fluids and gavage tube feeds, Risk of hypoglycemia requiring dextrose infusion, Risk of hypothermia and need for radiant warmer and/or isolette, Risk of immature cardiorespiratory control and need for monitoring and/or caffeine, Risk of intraventricular hemorrhage and possible need for brain ultrasound, Risk of prolonged patent ductus  arteriosus and possible need for pharmacologic or surgical treatment , Risk of respiratory distress and possible need for support (oxygen, CPAP, intubation, and/or surfactant), Risk of sepsis and possible need for lab tests and IV antibiotics, and Survival and neurodevelopmental outcomes for babies born at 31 weeks         All of Jules's questions were answered to the best of my ability, and she was encouraged to contact us with any further questions.      Thank you for including us in the care of Jules Frostmary Onel. Please reach out to the on-call Neonatologist via [employer] for any further questions that may arise.    I spent 20  minutes in consultation with Jules Greg Sykes, of which 100% was in direct communication.    JAMIE Fallon  - Medicine

## 2024-05-13 NOTE — ASSESSMENT & PLAN NOTE
Patient presented with DFM then had sustained SRBP in L&D triage and thereby met criteria for preE w/ SF.  S/p Procardia IR 10 mg PO, Labetalol 20/40/80 mg IV, and Hydralazine 5 mg IV in L&D triage with ongoing monitoring of BP to determine if BP will stabilize or not.    Systolic (24hrs), Av , Min:160 , Max:196   Diastolic (24hrs), Av, Min:93, Max:111    CBC & CMP wnl  UP:C 9.33    Monitor BP; may consider emergent delivery if unable to control BP with ongoing acute treatment  Magnesium ordered for seizure prophylaxis (will have dual benefit of fetal neuroprotection)  Labs q6h while on magnesium  Betamethasone ordered for fetal lung maturation  NPO in case emergent delivery via  is indicated for unstable maternal status, though otherwise if stabilized, patient would be a candidate for vaginal delivery at 34w0d  Monitoring: continuous  DVT ppx: SCDs

## 2024-05-13 NOTE — ANESTHESIA POSTPROCEDURE EVALUATION
Post-Op Assessment Note    CV Status:  Stable  Pain Score: 0 (denies)    Pain management: adequate       Mental Status:  Alert and awake   Hydration Status:  Euvolemic   PONV Controlled:  Controlled   Airway Patency:  Patent     Post Op Vitals Reviewed: Yes    No anethesia notable event occurred.    Staff: CRNA               BP   121/65   Temp   97.3   Pulse  61   Resp   18   SpO2   95% RA

## 2024-05-13 NOTE — ASSESSMENT & PLAN NOTE
Patient presented with DFM then had sustained SRBP in L&D triage and thereby met criteria for preE w/ SF.  S/p Procardia IR 10 mg PO, Labetalol 20/40/80 mg IV, and Hydralazine 5 mg IV in L&D triage    Systolic (24hrs), Av , Min:126 , Max:147   Diastolic (24hrs), Av, Min:81, Max:92    CBC & CMP wnl  UP:C 9.33    Monitor BP  Asymptomatic  S/p magnesium gtt  Trend labs  Procardia 30 mg Xl qd started , increased to Procardia 60 mg XL 5/15 --> increaed to Procardia XL 90 mg : continue to trend BP and monitor headache  Consider 5 day course of Lasix 20mg

## 2024-05-13 NOTE — ASSESSMENT & PLAN NOTE
Fetal monitoring non-reactive on admission with fetal tachycardia with baseline of 165 bpm and minimal variability with no accelerations or decelerations.  Prenatal labs notable for Rubella and hep B non-immune status  GBS pending (collected on admission)    Continuous monitoring during maternal stabilization  Consider DALJIT when maternal status stabilized  Daily PNV  Follow up GBS status

## 2024-05-13 NOTE — OP NOTE
OPERATIVE REPORT  PATIENT NAME: Jules Sykes    :  2000  MRN: [id number]  Pt Location: [facility] L&D OR ROOM 02    SURGERY DATE: 2024     Section Procedure Note    Indications:   Category 2 FHT remote from delivery    Pre-operative Diagnosis:   31w1d pregnancy  Preeclampsia with SF  Depression/anxiety  Insufficient prenatal care  Rubella and Hep B nonimmune    Post-operative Diagnosis:   1CCS    Attending: Jaja Spain MD  Resident: Marilyn Suarez MD, Karmen Ridley MD    Maternal Findings:  Normal uterus  Normal tubes and ovaries bilaterally  No fascial defects noted prior to closure   Rectus muscles re-approximated and hemostatic prior to closure   No adhesions  No difficulty noted from skin to delivery     Findings:  Delivery of viable female on 2024 at 0754 weighing 2lbs 10.8oz  Apgar scores of 3 at one minute and 8 at five minutes  Clear amniotic fluid  Normal placenta with 3-vessel cord    Arterial and Venous Gases:  Umbilical Cord Venous Blood Gas:  Results from last 7 days   Lab Units 24  0754   PH COV  7.091*   PCO2 COV mm HG 51.0*   HCO3 COV mmol/L 15.2   BASE EXC COV mmol/L -14.8*   O2 CT CD VB mL/dL 8.7   O2 HGB, VENOUS CORD % 39.5     Umbilical Cord Arterial Blood Gas:  Results from last 7 days   Lab Units 24  0754   PH COA  7.016*   PCO2 COA  71.9*   PO2 COA mm HG 15.6   HCO3 COA mmol/L 18.0   BASE EXC COA mmol/L -14.3*   O2 CONTENT CORD ART ml/dl 4.8   O2 HGB, ARTERIAL CORD % 22.4       Specimens: Arterial and venous cord gases, cord blood, segment of umbilical cord, placenta to pathology    Ross Criteria Decision Aid:   Multiple pregnancy - NO - Transverse or oblique lie - No - Breech lie - NO - Gestational age <37 weeks - YES -  is ROSS GROUP 10    Quantitative Blood Loss: 289 mL    Drains: Copeland catheter           Complications:  None; patient tolerated the procedure well.           Disposition: PACU            Condition:  stable    Brief History   Jules Sykes is a 25 y/o  at 31w1d who presented to labor and delivery and received a new diagnosis of preeclampsia with severe features.  She had sustained severe range blood pressures requiring acute treatment with multiple antihypertensive agents.  She received magnesium for seizure prophylaxis and fetal neuroprotection and single dose of betamethasone for fetal lung maturation. Fetal monitoring showed minimal variability with recurrent decelerations despite resuscitative measures.  Due to this, decision was made to proceed to the OR for a likely classical primary  section.  Starting Hgb was 11.6, platelet count 207. Risks, benefits, possible complications, alternate treatment options and expected outcomes were discussed with the patient. The patient agreed upon the proposed plan and gave informed consent for  delivery for the indication of category 2 fetal heart tracing remote from delivery.     Procedure Details   The patient was taken to the OB Operating Room at 0734 where she received spinal anesthesia. For infection prophylaxis, she received 2g ancef preoperatively. Fetal heart tones in the OR were assessed and initially noted to be within normal limits so chlorhexidine was used for vaginal prep and a Copeland catheter and SCDs were placed.  During this time the fetal heart rate was noted to decelerate into the 90s so we proceeded in an urgent fashion. The abdomen was prepped with Betadine and the patient was draped in the usual sterile manner.   A Time Out was held and the above information confirmed.  The patient was identified as Jules Sykes and the procedure verified as a  Delivery for category 2 fetal heart tracing.    Start time was 0750. A Pfannenstiel incision was made and carried down through the underlying subcutaneous tissue and to the fascia was nicked at the midline using a scalpel.  We proceeded in Mikey Santos fashion using blunt  dissection. The rectus muscles were  and the peritoneum was identified, entered, and extended longitudinally with blunt dissection. The Eric Retractor was inserted.      The lower uterine segment was not very well developed; therefore, the decision was made to proceed with classical hysterotomy. The uterus was incised in a vertical fashion through the contractile portion. The incision was extended using blunt traction. The amniotic sac was entered and the infant was delivered in the cephalic presentation by elevating the head out of the pelvis. Gentle fundal pressure brought the head to the incision, and the infant was delivered without difficulty. The mouth and nose were bulb-suctioned and there was no apparent injury to the . The cord was clamped and cut after 30 seconds to allow for delayed cord clamping and then handed off to the awaiting NICU team. Arterial and venous cord gases, cord blood, and a segment of umbilical cord were obtained for evaluation. The placenta delivered with uterine massage and was sent to pathology for evaluation.     The uterus was exteriorized and cleared of all clot and debris with a lap sponge. The myometrial layer of the hysterotomy was repaired in 2 layers using 0 Monocryl x2 in a running locked fashion. Figure-of-eight stitches with 0 Vicryl were placed as needed for additional hemostasis.  The serosal layer was imbricated using 3-0 Monocryl was in a baseball stitch fashion. The uterus was returned to the abdomen and the paracolic gutters were inspected and cleared of all clots and debris. Nu-Knit was placed over the hysterotomy for additional hemostasis.    The Eric retractor was removed.  Due to significant defects between the muscle bodies and concern for possible bowel herniation, the rectus layer was reapproximated in a running nonlocked fashion using 0 Vicryl.  The fascia was closed with a running suture of 0 Vicryl. Subcutaneous adipose tissue was closed  in 2 layers with a running suture of 2-0 Vicryl followed by 2-0 Plain gut. The skin was closed with a subcuticular running suture of 3-0 Monocryl.  A pressure dressing was applied.    The patient appeared to tolerate the procedure very well.  Lap sponge, needle, and instrument counts were correct x2.  The patient's fundus was palpated and the uterus was expressed. She was then cleaned and transferred to her postpartum recovery room in stable condition and her infant went to the NICU.    Attending Attestation: Dr. Jaja Spain MD was present for the entire procedure.      SIGNATURE: Marilyn Suarez MD  DATE: May 13, 2024  TIME: 8:59 AM

## 2024-05-13 NOTE — CONSULTS
Consultation - Maternal Fetal Medicine   Vision Greg Sykes 24 y.o. female MRN: [id number]  Unit/Bed#: LD TRIAGE  Encounter: [id number]  Admit date: 2024.  Today's date: 24    Assessment/Plan   Ms. Sykes is a 24 y.o.  at 31w0d, Hospital Day: 1, admitted for preeclampsia with severe features.  By issue:    * Preeclampsia  Assessment & Plan  Patient presented with DFM then had sustained SRBP in L&D triage and thereby met criteria for preE w/ SF.  S/p Procardia IR 10 mg PO, Labetalol 20/40/80 mg IV, and Hydralazine 5 mg IV in L&D triage with ongoing monitoring of BP to determine if BP will stabilize or not.    Systolic (24hrs), Av , Min:160 , Max:196   Diastolic (24hrs), Av, Min:93, Max:111    CBC & CMP wnl  UP:C 9.33    Monitor BP; may consider emergent delivery if unable to control BP with ongoing acute treatment  Magnesium ordered for seizure prophylaxis (will have dual benefit of fetal neuroprotection)  Labs q6h while on magnesium  Betamethasone ordered for fetal lung maturation  NPO in case emergent delivery via  is indicated for unstable maternal status, though otherwise if stabilized, patient would be a candidate for vaginal delivery at 34w0d  Monitoring: continuous  DVT ppx: SCDs    Rubella nonimmune status, delivered, current hospitalization  Assessment & Plan  Recommend vaccination postpartum     Nonimmune to hepatitis B virus  Assessment & Plan  Recommend vaccination antepartum after patient is stabilized     Decreased fetal movement  Assessment & Plan  Fetal monitoring non-reactive on admission with fetal tachycardia with baseline of 165 bpm and minimal variability with no accelerations or decelerations.    Continuous monitoring during maternal stabilization  Consider DALJIT when maternal status stabilized    Insufficient prenatal care in second trimester  Assessment & Plan  No routine OB care from 20w to 29w1d.    Consider case management consult postpartum vs.  "antepartum    31 weeks gestation of pregnancy  Assessment & Plan  Fetal monitoring non-reactive on admission with fetal tachycardia with baseline of 165 bpm and minimal variability with no accelerations or decelerations.  Prenatal labs notable for Rubella and hep B non-immune status  GBS pending (collected on admission)    Continuous monitoring during maternal stabilization  Consider DALJIT when maternal status stabilized  Daily PNV  Follow up GBS status    Depression  Assessment & Plan  Home Zoloft ordered     Generalized anxiety disorder  Assessment & Plan  Home Zoloft ordered      Chief Complaint   Patient presents with    Decreased Fetal Movement       Physician Requesting Consult: Carmen Sims MD    Reason for Consult / Principal Problem: preeclampsia with severe features    Subspeciality: Perinatology    HPI:  Jules Sykes is a 24 y.o.  with an BRENNA of 24 at 31w0d, Hospital Day: 1, admitted for preeclampsia with severe features.  Her current pregnancy is significant for anxiety, depression, Rubella non-immune status, hep B non-immune status, and insufficient prenatal care.  Her obstetrical history is significant for G1.      She initially presented for decreased fetal movement after not feeling her baby move all day.  She denies cramping, contractions, vaginal bleeding, or leaking fluid.  She denies headache but says her head feels \"heavy.\"  She endorses blurry vision but doesn't have her glasses with her.  She had diarrhea, chest pain, and SOB yesterday, all of which are resolved today.  She denies abdominal pain or swelling increased from her baseline this pregnancy (does note her face and legs feels swollen but is clear that this has been for her entire pregnancy).     Review of Systems   Constitutional:  Negative for chills and fever.   Eyes:  Negative for visual disturbance.   Respiratory:  Positive for shortness of breath. Negative for cough.    Cardiovascular:  Positive for chest " pain. Negative for leg swelling.   Gastrointestinal:  Positive for diarrhea. Negative for abdominal pain, nausea and vomiting.   Genitourinary:  Negative for dysuria, frequency, hematuria, pelvic pain, urgency, vaginal bleeding and vaginal discharge.   Neurological:  Positive for headaches. Negative for dizziness and light-headedness.       Other history is as follows:    Historical Information   OB History    Para Term  AB Living   1 0 0 0 0 0   SAB IAB Ectopic Multiple Live Births   0 0 0 0 0      # Outcome Date GA Lbr Anthony/2nd Weight Sex Delivery Anes PTL Lv   1 Current              Gynecologic history: Patient's last menstrual period was 10/08/2023 (approximate).     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 - has EKG done every 2 years    Memory changes 08/10/2018     Past Surgical History:   Procedure Laterality Date    NO PAST SURGERIES       Social History   Social History     Substance and Sexual Activity   Alcohol Use Not Currently    Alcohol/week: 1.0 standard drink of alcohol    Types: 1 Standard drinks or equivalent per week    Comment: social     Social History     Substance and Sexual Activity   Drug Use Not Currently    Types: Marijuana    Comment: Last use      Social History     Tobacco Use   Smoking Status Never   Smokeless Tobacco Never     Meds/Allergies   Prior to Admission Medications   Prescriptions Last Dose Informant Patient Reported? Taking?   Prenatal 27-1 MG TABS 2024  No Yes   Sig: Take 1 tablet by mouth in the morning   prenatal vitamin (CLASSIC FORMULA) 27-0.8 mg 2024 Self Yes Yes   Sig: Take 1 tablet by mouth every morning before breakfast   sertraline (ZOLOFT) 100 mg tablet 2024  No Yes   Sig: Take 1.5 tablets (150 mg total) by mouth daily      Facility-Administered Medications: None     Medication Administration - last 24 hours  "from 05/11/2024 2013 to 05/12/2024 2013         Date/Time Order Dose Route Action Action by     05/12/2024 1934 EDT NIFEdipine (PROCARDIA) capsule 10 mg 10 mg Oral Given Precious Castillo RN          Current Facility-Administered Medications   Medication Dose Route Frequency    acetaminophen (TYLENOL) tablet 975 mg  975 mg Oral Q8H PRN    betamethasone acetate-betamethasone sodium phosphate (CELESTONE) injection 12 mg  12 mg Intramuscular Q24H    calcium carbonate (TUMS) chewable tablet 1,000 mg  1,000 mg Oral TID PRN    calcium gluconate 10 % injection 1 g  1 g Intravenous Once PRN    hydrOXYzine HCL (ATARAX) tablet 25 mg  25 mg Oral Q6H PRN    labetalol (NORMODYNE) 5 mg/mL injection **ADS Override Pull**        labetalol (NORMODYNE) injection 10 mg  10 mg Intravenous Once    lactated ringers infusion  50 mL/hr Intravenous Continuous    magnesium sulfate 4 g/100 mL IVPB (premix) 4 g  4 g Intravenous Once    Followed by    magnesium sulfate 2 g/50 mL IVPB (premix) 2 g  2 g Intravenous Once    magnesium sulfate 20 g/500 mL infusion (premix)  2 g/hr Intravenous Continuous    melatonin tablet 3 mg  3 mg Oral HS PRN    ondansetron (ZOFRAN) injection 4 mg  4 mg Intravenous Q6H PRN    [START ON 5/13/2024] prenatal multivitamin tablet 1 tablet  1 tablet Oral Daily    [START ON 5/13/2024] sertraline (ZOLOFT) tablet 150 mg  150 mg Oral Daily     No Known Allergies    Objective    Patient Vitals for the past 24 hrs:   Temp Temp src Resp Height   05/12/24 1912 99.8 °F (37.7 °C) Oral 18 5' 1\" (1.549 m)     Vitals:  Temperature 99.8 °F (37.7 °C), temperature source Oral, resp. rate 18, height 5' 1\" (1.549 m), last menstrual period 10/08/2023, not currently breastfeeding.Body mass index is 41.86 kg/m².    Physical Exam  Constitutional:       General: She is not in acute distress.     Appearance: Normal appearance. She is not ill-appearing.   HENT:      Mouth/Throat:      Mouth: Mucous membranes are moist.   Eyes:      Extraocular " Movements: Extraocular movements intact.   Cardiovascular:      Rate and Rhythm: Normal rate and regular rhythm.      Heart sounds: Normal heart sounds.   Pulmonary:      Effort: Pulmonary effort is normal.      Breath sounds: Normal breath sounds.   Abdominal:      General: There is no distension.      Palpations: Abdomen is soft.      Tenderness: There is no abdominal tenderness. There is no guarding.   Musculoskeletal:         General: No swelling or tenderness.      Right lower leg: No edema.      Left lower leg: No edema.   Skin:     General: Skin is warm and dry.      Coloration: Skin is not jaundiced or pale.   Neurological:      General: No focal deficit present.      Mental Status: She is alert.   Psychiatric:         Mood and Affect: Mood normal.         Behavior: Behavior normal.         Thought Content: Thought content normal.         Judgment: Judgment normal.       SVE: deferred     Prenatal Labs: I have personally reviewed pertinent reports.    Blood type: AB positive  Antibody: negative  GBS: pending (collected on admission)  HIV: non-reactive  Rubella: non-immune  Syphilis IgM/IgG: non-reactive  HBsAb: non-immune  HBsAg: non-reactive  HCAb: non-reactive  Chlamydia: negative  Gonorrhea: negative  Diabetes 1 hour screen: 102  3 hour glucose: not indicated  Platelets: 238  Hgb: 11.0    Fetal data:  FHT:  Baseline: 165 bpm  Variability: minimal  Accelerations: absent  Decelerations: absent  NST non-reactive     TOCO:   No contractions    MFM ultrasound report key findings:   Level I US on 3/29/24 at 24w5d:  Breech presentation  Posterior placenta    AC             21.43 cm        25 weeks 6 days* (78%)  BPD             5.43 cm        22 weeks 4 days* (1%)  HC             22.08 cm        24 weeks 1 day * (13%)  Femur           4.28 cm        24 weeks 0 days* (17%)  HC/AC           1.03 [1.05 - 1.21]                 (7%)  FL/AC             20 [20 - 24]  FL/BPD            79 [71 - 87]  EFW [place] 4    743  grams - 1 lbs 10 oz                 (48%)    Q1: 2.2      Q2: 2.6      Q3: 1.6      Q4: 3.5  DALJIT Total = 10.0 cm  Amniotic Fluid: Normal    Imaging, EKG, Pathology, and Other Studies: I have personally reviewed pertinent reports.          Maribel Rodriguez MD  5/12/2024  8:13 PM

## 2024-05-13 NOTE — PROGRESS NOTES
Fetal monitoring non-reactive with minimal variability and intermittent isolated decelerations including some variable decelerations with no contractions noted subjectively or on toco.  Patient reports a 7/10 headache.  Will try a 500 mL bolus of IVF for fetal resuscitation with ongoing maternal repositioning, and will give Reglan and Benadryl and Tylenol for the patient's headache.  If either of these is not improved s/p the above-listed interventions, will consider delivery for fetal and/or maternal status.     Reviewed with Dr. Francois Rodriguez MD  OBGYN Resident  05/13/24  1:24 AM

## 2024-05-14 PROBLEM — N17.9 ACUTE KIDNEY INJURY (HCC): Status: ACTIVE | Noted: 2024-05-14

## 2024-05-14 LAB
ALBUMIN SERPL BCP-MCNC: 2.8 G/DL (ref 3.5–5)
ALP SERPL-CCNC: 137 U/L (ref 34–104)
ALT SERPL W P-5'-P-CCNC: 15 U/L (ref 7–52)
ANION GAP SERPL CALCULATED.3IONS-SCNC: 6 MMOL/L (ref 4–13)
AST SERPL W P-5'-P-CCNC: 30 U/L (ref 13–39)
BILIRUB SERPL-MCNC: 0.14 MG/DL (ref 0.2–1)
BUN SERPL-MCNC: 17 MG/DL (ref 5–25)
CALCIUM ALBUM COR SERPL-MCNC: 8 MG/DL (ref 8.3–10.1)
CALCIUM SERPL-MCNC: 7 MG/DL (ref 8.4–10.2)
CHLORIDE SERPL-SCNC: 105 MMOL/L (ref 96–108)
CO2 SERPL-SCNC: 23 MMOL/L (ref 21–32)
CREAT SERPL-MCNC: 0.9 MG/DL (ref 0.6–1.3)
DME PARACHUTE DELIVERY DATE ACTUAL: NORMAL
DME PARACHUTE DELIVERY DATE REQUESTED: NORMAL
DME PARACHUTE ITEM DESCRIPTION: NORMAL
DME PARACHUTE ORDER STATUS: NORMAL
DME PARACHUTE SUPPLIER NAME: NORMAL
DME PARACHUTE SUPPLIER PHONE: NORMAL
ERYTHROCYTE [DISTWIDTH] IN BLOOD BY AUTOMATED COUNT: 15.9 % (ref 11.6–15.1)
GFR SERPL CREATININE-BSD FRML MDRD: 90 ML/MIN/1.73SQ M
GLUCOSE SERPL-MCNC: 124 MG/DL (ref 65–140)
GP B STREP DNA SPEC QL NAA+PROBE: NEGATIVE
HCT VFR BLD AUTO: 31.3 % (ref 34.8–46.1)
HGB BLD-MCNC: 9.8 G/DL (ref 11.5–15.4)
MAGNESIUM SERPL-MCNC: 6.9 MG/DL (ref 1.9–2.7)
MCH RBC QN AUTO: 27.4 PG (ref 26.8–34.3)
MCHC RBC AUTO-ENTMCNC: 31.3 G/DL (ref 31.4–37.4)
MCV RBC AUTO: 87 FL (ref 82–98)
PLATELET # BLD AUTO: 215 THOUSANDS/UL (ref 149–390)
PMV BLD AUTO: 12.5 FL (ref 8.9–12.7)
POTASSIUM SERPL-SCNC: 4.6 MMOL/L (ref 3.5–5.3)
PROT SERPL-MCNC: 5.5 G/DL (ref 6.4–8.4)
RBC # BLD AUTO: 3.58 MILLION/UL (ref 3.81–5.12)
SODIUM SERPL-SCNC: 134 MMOL/L (ref 135–147)
WBC # BLD AUTO: 17.45 THOUSAND/UL (ref 4.31–10.16)

## 2024-05-14 PROCEDURE — 99232 SBSQ HOSP IP/OBS MODERATE 35: CPT | Performed by: OBSTETRICS & GYNECOLOGY

## 2024-05-14 PROCEDURE — 80053 COMPREHEN METABOLIC PANEL: CPT

## 2024-05-14 PROCEDURE — 85027 COMPLETE CBC AUTOMATED: CPT

## 2024-05-14 PROCEDURE — 83735 ASSAY OF MAGNESIUM: CPT

## 2024-05-14 RX ORDER — NIFEDIPINE 30 MG/1
30 TABLET, EXTENDED RELEASE ORAL DAILY
Status: DISCONTINUED | OUTPATIENT
Start: 2024-05-14 | End: 2024-05-15

## 2024-05-14 RX ORDER — ENOXAPARIN SODIUM 100 MG/ML
40 INJECTION SUBCUTANEOUS EVERY 12 HOURS SCHEDULED
Status: DISCONTINUED | OUTPATIENT
Start: 2024-05-14 | End: 2024-05-18 | Stop reason: HOSPADM

## 2024-05-14 RX ADMIN — ACETAMINOPHEN 650 MG: 325 TABLET, FILM COATED ORAL at 06:06

## 2024-05-14 RX ADMIN — KETOROLAC TROMETHAMINE 30 MG: 30 INJECTION, SOLUTION INTRAMUSCULAR; INTRAVENOUS at 08:38

## 2024-05-14 RX ADMIN — SENNOSIDES 8.6 MG: 8.6 TABLET, FILM COATED ORAL at 08:38

## 2024-05-14 RX ADMIN — SIMETHICONE 80 MG: 80 TABLET, CHEWABLE ORAL at 20:32

## 2024-05-14 RX ADMIN — ACETAMINOPHEN 650 MG: 325 TABLET, FILM COATED ORAL at 23:43

## 2024-05-14 RX ADMIN — ENOXAPARIN SODIUM 40 MG: 40 INJECTION SUBCUTANEOUS at 08:38

## 2024-05-14 RX ADMIN — NALBUPHINE HYDROCHLORIDE 5 MG: 10 INJECTION, SOLUTION INTRAMUSCULAR; INTRAVENOUS; SUBCUTANEOUS at 02:06

## 2024-05-14 RX ADMIN — KETOROLAC TROMETHAMINE 30 MG: 30 INJECTION, SOLUTION INTRAMUSCULAR; INTRAVENOUS at 02:02

## 2024-05-14 RX ADMIN — NIFEDIPINE 30 MG: 30 TABLET, EXTENDED RELEASE ORAL at 08:38

## 2024-05-14 RX ADMIN — ACETAMINOPHEN 650 MG: 325 TABLET, FILM COATED ORAL at 16:59

## 2024-05-14 RX ADMIN — MAGNESIUM SULFATE HEPTAHYDRATE 2 G/HR: 40 INJECTION, SOLUTION INTRAVENOUS at 02:09

## 2024-05-14 RX ADMIN — OXYCODONE HYDROCHLORIDE 5 MG: 5 TABLET ORAL at 20:29

## 2024-05-14 RX ADMIN — ACETAMINOPHEN 650 MG: 325 TABLET, FILM COATED ORAL at 00:14

## 2024-05-14 RX ADMIN — ENOXAPARIN SODIUM 40 MG: 40 INJECTION SUBCUTANEOUS at 20:29

## 2024-05-14 NOTE — CASE MANAGEMENT
Case Management Progress Note    Patient name Jules Sykes  Location /-01 MRN [id number]  : 2000 Date 2024       LOS (days): 2  Geometric Mean LOS (GMLOS) (days):   Days to GMLOS:        OBJECTIVE:        Current admission status: Inpatient  Preferred Pharmacy:   [id number] - [place] - [address]  [address] [address]  Phone: [phone] Fax: [phone]    Primary Care Provider: Almaz Massey DO    Primary Insurance: [id number]  Secondary Insurance:     PROGRESS NOTE:    CM received consult for MOB requesting Spectra S2 breast pump for home use. CM reviewed [place] and pump was ordered through [facility] by OP provider prior to admission. CM notified [employer] team via email that baby has been born and requested pump be delivered to MOB's bedside.

## 2024-05-14 NOTE — ASSESSMENT & PLAN NOTE
Routine postpartum care  Encourage ambulation  Encourage familial bonding  Lactation support as needed  Pain: Tylenol around the clock, oxycodone if needed  Postpartum Contraceptive plan: considering POPs as a bridge to nexplanon vs IUD  Pre-delivery Hgb 11.2 --> Post Delivery 10.5  Voiding spontaneously, passing flatus  DVT Ppx: ambulation, SCDs, Lovenox 40mg BID

## 2024-05-14 NOTE — ASSESSMENT & PLAN NOTE
Cr 1.01 on POD0 in evening -> 0.9  Continue to trend creatinine  Plan to restart motrin for pain control

## 2024-05-14 NOTE — PLAN OF CARE
Problem: PAIN - ADULT  Goal: Verbalizes/displays adequate comfort level or baseline comfort level  Description: Interventions:  - Encourage patient to monitor pain and request assistance  - Assess pain using appropriate pain scale  - Administer analgesics based on type and severity of pain and evaluate response  - Implement non-pharmacological measures as appropriate and evaluate response  - Consider cultural and social influences on pain and pain management  - Notify physician/advanced practitioner if interventions unsuccessful or patient reports new pain  Outcome: Progressing     Problem: INFECTION - ADULT  Goal: Absence or prevention of progression during hospitalization  Description: INTERVENTIONS:  - Assess and monitor for signs and symptoms of infection  - Monitor lab/diagnostic results  - Monitor all insertion sites, i.e. indwelling lines, tubes, and drains  - Monitor endotracheal if appropriate and nasal secretions for changes in amount and color  - [place] appropriate cooling/warming therapies per order  - Administer medications as ordered  - Instruct and encourage patient and family to use good hand hygiene technique  - Identify and instruct in appropriate isolation precautions for identified infection/condition  Outcome: Progressing  Goal: Absence of fever/infection during neutropenic period  Description: INTERVENTIONS:  - Monitor WBC    Outcome: Progressing     Problem: SAFETY ADULT  Goal: Patient will remain free of falls  Description: INTERVENTIONS:  - Educate patient/family on patient safety including physical limitations  - Instruct patient to call for assistance with activity   - Consult OT/PT to assist with strengthening/mobility   - Keep Call bell within reach  - Keep bed low and locked with side rails adjusted as appropriate  - Keep care items and personal belongings within reach  - Initiate and maintain comfort rounds  - Make Fall Risk Sign visible to staff  - Offer Toileting every  Hours,  in advance of need  - Initiate/Maintain alarm  - Obtain necessary fall risk management equipment:   - Apply yellow socks and bracelet for high fall risk patients  - Consider moving patient to room near nurses station  Outcome: Progressing  Goal: Maintain or return to baseline ADL function  Description: INTERVENTIONS:  -  Assess patient's ability to carry out ADLs; assess patient's baseline for ADL function and identify physical deficits which impact ability to perform ADLs (bathing, care of mouth/teeth, toileting, grooming, dressing, etc.)  - Assess/evaluate cause of self-care deficits   - Assess range of motion  - Assess patient's mobility; develop plan if impaired  - Assess patient's need for assistive devices and provide as appropriate  - Encourage maximum independence but intervene and supervise when necessary  - Involve family in performance of ADLs  - Assess for home care needs following discharge   - Consider OT consult to assist with ADL evaluation and planning for discharge  - Provide patient education as appropriate  Outcome: Progressing  Goal: Maintains/Returns to pre admission functional level  Description: INTERVENTIONS:  - Perform AM-PAC 6 Click Basic Mobility/ Daily Activity assessment daily.  - Set and communicate daily mobility goal to care team and patient/family/caregiver.   - Collaborate with rehabilitation services on mobility goals if consulted  - Perform Range of Motion  times a day.  - Reposition patient every hours.  - Dangle patient  times a day  - Stand patient  times a day  - Ambulate patient  times a day  - Out of bed to chair  times a day   - Out of bed for meals  times a day  - Out of bed for toileting  - Record patient progress and toleration of activity level   Outcome: Progressing     Problem: DISCHARGE PLANNING  Goal: Discharge to home or other facility with appropriate resources  Description: INTERVENTIONS:  - Identify barriers to discharge w/patient and caregiver  - Arrange for  needed discharge resources and transportation as appropriate  - Identify discharge learning needs (meds, wound care, etc.)  - Arrange for interpretive services to assist at discharge as needed  - Refer to Case Management Department for coordinating discharge planning if the patient needs post-hospital services based on physician/advanced practitioner order or complex needs related to functional status, cognitive ability, or social support system  Outcome: Progressing     Problem: POSTPARTUM  Goal: Experiences normal postpartum course  Description: INTERVENTIONS:  - Monitor maternal vital signs  - Assess uterine involution and lochia  Outcome: Progressing  Goal: Appropriate maternal -  bonding  Description: INTERVENTIONS:  - Identify family support  - Assess for appropriate maternal/infant bonding   -Encourage maternal/infant bonding opportunities  - Referral to  or  as needed  Outcome: Progressing  Goal: Establishment of infant feeding pattern  Description: INTERVENTIONS:  - Assess breast/bottle feeding  - Refer to lactation as needed  Outcome: Progressing  Goal: Incision(s), wounds(s) or drain site(s) healing without S/S of infection  Description: INTERVENTIONS  - Assess and document dressing, incision, wound bed, drain sites and surrounding tissue  - Provide patient and family education  - Perform skin care/dressing changes every   Outcome: Progressing

## 2024-05-14 NOTE — PROGRESS NOTES
Progress Note - OB/GYN   Vision Greg Sykes 24 y.o. female MRN: [id number]  Unit/Bed#:  323-01 Encounter: [id number]      Assessment/Plan    Jules Sykes is a 24 y.o.  who is PPD 1 s/p CCS at 31w1d     Delivery by classical  section  Assessment & Plan  Routine postpartum care  Encourage ambulation  Encourage familial bonding  Lactation support as needed  Pain: Motrin/Tylenol around the clock, oxycodone if needed  Postpartum Contraceptive plan: discuss  Pre-delivery Hgb 11.2 --> Post Delivery 10.5  Copeland catheter: out, passed VT  DVT Ppx: ambulation, SCDs, Lovenox 40mg BID held, plan to resume after JOSIE resolves    Acute kidney injury (HCC)  Assessment & Plan  Cr 1.01 on POD0 in evening; magnesium to be discontinued 14 am  Continue to trend creatinine  Avoid nephrotoxic agents    Rubella nonimmune status, delivered, current hospitalization  Assessment & Plan  Recommend vaccination postpartum     Nonimmune to hepatitis B virus  Assessment & Plan  Recommend vaccination postpartum    Insufficient prenatal care in second trimester  Assessment & Plan  No routine OB care from 20w to 29w1d.     Consider case management consult postpartum    Depression  Assessment & Plan  Home Zoloft ordered    Generalized anxiety disorder  Assessment & Plan  Home Zoloft ordered    * Preeclampsia  Assessment & Plan  Patient presented with DFM then had sustained SRBP in L&D triage and thereby met criteria for preE w/ SF.  S/p Procardia IR 10 mg PO, Labetalol 20/40/80 mg IV, and Hydralazine 5 mg IV in L&D triage    Systolic (24hrs), Av , Min:117 , Max:155   Diastolic (24hrs), Av, Min:63, Max:96    CBC & CMP wnl  UP:C 9.33    Monitor BP  Magnesium ordered for seizure prophylaxis; plan to discontinue at 24h postpartum  Trend labs  Procardia 30 mg Xl qd started            Disposition: Anticipate discharge home postpartum Day #3-4  Barriers to discharge: ongoing couplet care, blood  pressures      Subjective/Objective     Subjective: Postpartum state    Pain: no  Tolerating PO: yes  Voiding: yes  Flatus: yes  BM: no  Ambulating: yes  Breastfeeding: Bottle feeding and Using breast pump  Chest pain: no  Shortness of breath: no  Leg pain: no  Lochia: minimal    Objective:     Vitals:  Vitals:    05/14/24 0007 05/14/24 0158 05/14/24 0408 05/14/24 0610   BP: 155/85 147/87 141/85 151/95   BP Location: Left arm Left arm Left arm Left arm   Pulse: 69 68 72 71   Resp: 18 18 18 18   Temp: 97.9 °F (36.6 °C) 97.9 °F (36.6 °C) 97.6 °F (36.4 °C) 97.9 °F (36.6 °C)   TempSrc: Oral Oral Oral Oral   SpO2: 98% 98% 99% 98%   Weight:       Height:           Physical Exam:   GEN: appears tired, alert and oriented x 3, pleasant and cooperative   CV: Regular rate and rhythm   RESP: non labored breathing, lungs clear to auscultation   ABDOMEN: soft, no tenderness, mild distention, Uterine fundus firm and non-tender, -1 cm below the umbilicus, incision c/d/i  EXTREMITIES: non-tender  NEURO Alert and oriented to person, place, and time. Bilateral patellar reflexes 2+      Lab Results   Component Value Date    WBC 21.02 (H) 05/13/2024    HGB 10.5 (L) 05/13/2024    HCT 32.7 (L) 05/13/2024    MCV 87 05/13/2024     05/13/2024         Hamida Majano MD  Obstetrics & Gynecology  05/14/24

## 2024-05-14 NOTE — CASE MANAGEMENT
Case Management Assessment    Patient name Jules Sykes  Location /-01 MRN [id number]  : 2000 Date 2024       Current Admission Date: 2024  Current Admission Diagnosis:Preeclampsia   Patient Active Problem List    Diagnosis Date Noted    Acute kidney injury (HCC) 2024    Delivery by classical  section 2024    History of classical  section 2024    Decreased fetal movement 2024    Nonimmune to hepatitis B virus 2024    Rubella nonimmune status, delivered, current hospitalization 2024    Insufficient prenatal care in second trimester 2024    Back pain affecting pregnancy in second trimester 2024    Preeclampsia 2024    31 weeks gestation of pregnancy 2024    History of Kawasaki's disease 2024    Herniated lumbar intervertebral disc 2022    Depression 2022    Generalized anxiety disorder 2022    Flexural eczema 2019    Myopia 2014      LOS (days): 2  Geometric Mean LOS (GMLOS) (days):   Days to GMLOS:     OBJECTIVE:    Risk of Unplanned Readmission Score: 9.31         Current admission status: Inpatient       Preferred Pharmacy:   [id number] [place] - [address] [place] [address]  [address] [place] [address]  Phone: [phone] Fax: [phone]    Primary Care Provider: Almaz Massey DO    Primary Insurance: [employer] [facility]  Secondary Insurance:     ASSESSMENT:  Active Health Care Proxies    There are no active Health Care Proxies on file.          CM met with MOB to introduce CM services, complete assessment, and provide CM contact info.      MOB reported the following:    Assessment:  Consult reason: NICU Admission and Social Issues  Gestational Age at Birth: 31 Weeks + 1 Days  MOB Name (& age if teen):   Jules Sykes  FOB Name (& age if teen MOB):   Doc Pickett  Other Legal Guardian(s) for Baby:  n/a    Other Children:    None  Housing Plan/Lives with:   FOB  Insurance Coverage/Plan for Baby: MOB verbalizes that they will contact their insurance to add baby ASAP.   Support System: Family and Spouse/Significant Other  Care Items: Car Seat and Crib/Bassinet (Safe Sleep Space) MOB reported she is going to have a baby shower and family is assisting with some items.  Method of Feeding: Breast Feeding  Breast Pump: CM ordering/ordered breast pump  Government Assistance Programs: WIC (Special Supplemental Nutrition Program for Women, Infants, and Children) and SNAP (Supplemental Nutrition Assistance Program)   Arrangements: MOB  Current Employment/Schooling: MOB unemployed  Mental Health History and/or Treatment:   MOB reported history of anxiety and depression currently on Zoloft, she reported she is on [facility] wait list, MOB was given resources for  [facility]  Substance Use History and/or Treatment:   None reported   Urine Drug Screen Results: Not Applicable  Children & Youth History: None  Current Legal Issues: N/A  Domestic/Intimate Partner Violence History: Denies.  NICU Resources: NICU Packet Provided Susy hammond referral made     Discharge Plan:  Pediatrician:   TBD  [facility] Care:  [facility]   Follow-Up Appointments Needed/Scheduled: TBD  Medications/DME/Other Referrals: TBD  Transportation Plan: BO has a vehicle    Follow-Up Needed from Care Management:     MOB reported ongoing issues with finance as she is currently out of work, she was supposed to start a new job next week.  MOB reported being behind in rent, FOB currently working and paying bills.  CM made referral to Radha Hammond for care package and gas cards.  CM will continue to follow for ongoing needs.          Social Determinants of Health (SDOH)      Flowsheet Row Most Recent Value   Housing Stability    In the last 12 months, was there a time when you were not able to pay the mortgage or rent on time? Y   In the last 12 months, how  many places have you lived? 1   In the last 12 months, was there a time when you did not have a steady place to sleep or slept in a shelter (including now)? N   Transportation Needs    In the past 12 months, has lack of transportation kept you from medical appointments or from getting medications? no   In the past 12 months, has lack of transportation kept you from meetings, work, or from getting things needed for daily living? No   Food Insecurity    Within the past 12 months, you worried that your food would run out before you got the money to buy more. Sometimes   Within the past 12 months, the food you bought just didn't last and you didn't have money to get more. Sometimes   Utilities    In the past 12 months has the electric, gas, oil, or water company threatened to shut off services in your home? No

## 2024-05-15 PROCEDURE — 99232 SBSQ HOSP IP/OBS MODERATE 35: CPT | Performed by: OBSTETRICS & GYNECOLOGY

## 2024-05-15 PROCEDURE — 88307 TISSUE EXAM BY PATHOLOGIST: CPT | Performed by: PATHOLOGY

## 2024-05-15 RX ORDER — SERTRALINE HYDROCHLORIDE 100 MG/1
100 TABLET, FILM COATED ORAL DAILY
Status: DISCONTINUED | OUTPATIENT
Start: 2024-05-15 | End: 2024-05-15

## 2024-05-15 RX ORDER — NIFEDIPINE 30 MG/1
60 TABLET, EXTENDED RELEASE ORAL DAILY
Status: DISCONTINUED | OUTPATIENT
Start: 2024-05-15 | End: 2024-05-17

## 2024-05-15 RX ORDER — IBUPROFEN 600 MG/1
600 TABLET ORAL EVERY 6 HOURS PRN
Status: DISCONTINUED | OUTPATIENT
Start: 2024-05-15 | End: 2024-05-18 | Stop reason: HOSPADM

## 2024-05-15 RX ORDER — SERTRALINE HYDROCHLORIDE 100 MG/1
100 TABLET, FILM COATED ORAL
Status: DISCONTINUED | OUTPATIENT
Start: 2024-05-15 | End: 2024-05-18 | Stop reason: HOSPADM

## 2024-05-15 RX ADMIN — ACETAMINOPHEN 650 MG: 325 TABLET, FILM COATED ORAL at 11:33

## 2024-05-15 RX ADMIN — ENOXAPARIN SODIUM 40 MG: 40 INJECTION SUBCUTANEOUS at 09:00

## 2024-05-15 RX ADMIN — ACETAMINOPHEN 650 MG: 325 TABLET, FILM COATED ORAL at 06:14

## 2024-05-15 RX ADMIN — SENNOSIDES 8.6 MG: 8.6 TABLET, FILM COATED ORAL at 09:00

## 2024-05-15 RX ADMIN — SIMETHICONE 80 MG: 80 TABLET, CHEWABLE ORAL at 03:18

## 2024-05-15 RX ADMIN — SIMETHICONE 80 MG: 80 TABLET, CHEWABLE ORAL at 15:44

## 2024-05-15 RX ADMIN — ENOXAPARIN SODIUM 40 MG: 40 INJECTION SUBCUTANEOUS at 23:21

## 2024-05-15 RX ADMIN — SERTRALINE 100 MG: 100 TABLET, FILM COATED ORAL at 23:21

## 2024-05-15 RX ADMIN — ACETAMINOPHEN 650 MG: 325 TABLET, FILM COATED ORAL at 23:21

## 2024-05-15 RX ADMIN — SIMETHICONE 80 MG: 80 TABLET, CHEWABLE ORAL at 23:22

## 2024-05-15 RX ADMIN — ACETAMINOPHEN 650 MG: 325 TABLET, FILM COATED ORAL at 17:21

## 2024-05-15 RX ADMIN — NIFEDIPINE 60 MG: 30 TABLET, EXTENDED RELEASE ORAL at 09:00

## 2024-05-15 NOTE — PROGRESS NOTES
Obstetrics Progress Note  Vision Greg Sykes 24 y.o. female MRN: [id number]  Unit/Bed#:  323-01 Encounter: [id number]    Assessment/Plan:  Postoperative day #2 s/p classical  delivery. Stable.  Baby in NICU. By issue:  Delivery by classical  section  Assessment & Plan  Routine postpartum care  Encourage ambulation  Encourage familial bonding  Lactation support as needed  Pain: Tylenol around the clock, oxycodone if needed  Postpartum Contraceptive plan: considering POPs  Pre-delivery Hgb 11.2 --> Post Delivery 10.5  Voiding spontaneously, passing flatus  DVT Ppx: ambulation, SCDs, Lovenox 40mg BID    Acute kidney injury (HCC)  Assessment & Plan  Cr 1.01 on POD0 in evening -> 0.9  Continue to trend creatinine  Plan to restart motrin for pain control    Rubella nonimmune status, delivered, current hospitalization  Assessment & Plan  Recommend vaccination postpartum     Nonimmune to hepatitis B virus  Assessment & Plan  Recommend vaccination postpartum    Insufficient prenatal care in second trimester  Assessment & Plan  No routine OB care from 20w to 29w1d.     Consider case management consult postpartum    Depression  Assessment & Plan  Home Zoloft ordered    Generalized anxiety disorder  Assessment & Plan  Home Zoloft ordered    * Preeclampsia  Assessment & Plan  Patient presented with DFM then had sustained SRBP in L&D triage and thereby met criteria for preE w/ SF.  S/p Procardia IR 10 mg PO, Labetalol 20/40/80 mg IV, and Hydralazine 5 mg IV in L&D triage    Systolic (24hrs), Av , Min:137 , Max:164   Diastolic (24hrs), Av, Min:85, Max:100    CBC & CMP wnl  UP:C 9.33    Monitor BP  S/p magnesium gtt  Trend labs  Procardia 30 mg Xl qd started   Consider increasing procardia to 60 mg XL qd after pressure monitoring today  Headache this morning, plan to try tylenol first for management      Anticipate discharge POD #3 vs #4.      Subjective/Objective   Chief Complaint:   Post delivery.  "    Subjective:   Pain: reports pain with gas, otherwise well controlled. Tolerating PO: yes. Voiding: yes. Flatus: yes. Bowel Movement: yes. Ambulating: yes. Chest pain: no. Shortness of breath: no. Leg pain: no. Lochia: within normal limits. Infant feeding: breast/bottle. Reports \"mild\" headache this morning. She is due for tylenol. Denies vision changes, RUQ pain.    Objective:   Vitals:   Temp:  [97.8 °F (36.6 °C)-98.3 °F (36.8 °C)] 97.8 °F (36.6 °C)  HR:  [56-76] 71  Resp:  [16-20] 16  BP: (137-164)/() 154/99     Intake/Output Summary (Last 24 hours) at 5/15/2024 0614  Last data filed at 5/14/2024 0900  Gross per 24 hour   Intake --   Output 375 ml   Net -375 ml       Physical Exam:   -General: alert and oriented x 3, in no apparent distress  -Cardiovascular: regular rate and rhythm  -Pulmonary: normal effort, clear to auscultation bilaterally  -Abdomen/Pelvis: soft, non-tender, non-distended, no rebound or guarding. Uterine fundus firm and non-tender, at the umbilicus. Incision: CDI  -Extremities: Nontender    Lab Results   Component Value Date    WBC 17.45 (H) 05/14/2024    HGB 9.8 (L) 05/14/2024    HCT 31.3 (L) 05/14/2024    MCV 87 05/14/2024     05/14/2024         Dilcia Winter MD  PGY-I, OBGYN  5/15/2024, 6:14 AM    "

## 2024-05-15 NOTE — LACTATION NOTE
05/15/24 0930   Lactation Consultation   Reason for Consult 10 m;5 min;10 minute   Risk Factors NICU infant   Breasts/Nipples   Left Breast Soft;Other (Comment)  (swollen)   Right Breast Soft;Other (Comment)  (swollen)   Left Nipple Everted   Right Nipple Everted   Intervention Breast pump;Hand expression   Breastfeeding Status Yes   Breastfeeding Progress Pumping only   Reasons for not Breastfeeding Infant medical condition   Other OB Lactation Tools   Feeding Devices Pump;Syringe   Breast Pump   Pump 3;2;1   Patient Follow-Up   Lactation Consult Status 2   Follow-Up Type Inpatient;Call as needed   Other OB Lactation Documentation    Additional Problem Noted Mom pumping every 3 hours during the day. Mom has not been pumping overnight. Mom collecting 0.2 mLs of colostrum along with oral swabs. Enc to keep pumping 8-12x daily. Reviewed hands on pumping and hand expression. Enc to call for further assistance.     Mom provided with and discussed RBS, Hand expression/2nd night handout and increase supply for NICU baby. Reviewed pumping log and expectations for pumping output in the first week. Reviewed cycle pumping and appropriate pump settings, as well as pumping for 10-15 min 8-12 times per day.       Enc Mom to discussed putting baby to the breast with the NICU team when baby is medically stable to do so. Enc her to call for lactation support as needed throughout her stay.

## 2024-05-15 NOTE — PLAN OF CARE
Problem: PAIN - ADULT  Goal: Verbalizes/displays adequate comfort level or baseline comfort level  Description: Interventions:  - Encourage patient to monitor pain and request assistance  - Assess pain using appropriate pain scale  - Administer analgesics based on type and severity of pain and evaluate response  - Implement non-pharmacological measures as appropriate and evaluate response  - Consider cultural and social influences on pain and pain management  - Notify physician/advanced practitioner if interventions unsuccessful or patient reports new pain  Outcome: Progressing     Problem: INFECTION - ADULT  Goal: Absence or prevention of progression during hospitalization  Description: INTERVENTIONS:  - Assess and monitor for signs and symptoms of infection  - Monitor lab/diagnostic results  - Monitor all insertion sites, i.e. indwelling lines, tubes, and drains  - Monitor endotracheal if appropriate and nasal secretions for changes in amount and color  - [place] appropriate cooling/warming therapies per order  - Administer medications as ordered  - Instruct and encourage patient and family to use good hand hygiene technique  - Identify and instruct in appropriate isolation precautions for identified infection/condition  Outcome: Progressing  Goal: Absence of fever/infection during neutropenic period  Description: INTERVENTIONS:  - Monitor WBC    Outcome: Progressing     Problem: SAFETY ADULT  Goal: Patient will remain free of falls  Description: INTERVENTIONS:  - Educate patient/family on patient safety including physical limitations  - Instruct patient to call for assistance with activity   - Consult OT/PT to assist with strengthening/mobility   - Keep Call bell within reach  - Keep bed low and locked with side rails adjusted as appropriate  - Keep care items and personal belongings within reach  - Initiate and maintain comfort rounds  - Make Fall Risk Sign visible to staff  - Offer Toileting every  Hours,  in advance of need  - Initiate/Maintain alarm  - Obtain necessary fall risk management equipment:   - Apply yellow socks and bracelet for high fall risk patients  - Consider moving patient to room near nurses station  Outcome: Progressing  Goal: Maintain or return to baseline ADL function  Description: INTERVENTIONS:  -  Assess patient's ability to carry out ADLs; assess patient's baseline for ADL function and identify physical deficits which impact ability to perform ADLs (bathing, care of mouth/teeth, toileting, grooming, dressing, etc.)  - Assess/evaluate cause of self-care deficits   - Assess range of motion  - Assess patient's mobility; develop plan if impaired  - Assess patient's need for assistive devices and provide as appropriate  - Encourage maximum independence but intervene and supervise when necessary  - Involve family in performance of ADLs  - Assess for home care needs following discharge   - Consider OT consult to assist with ADL evaluation and planning for discharge  - Provide patient education as appropriate  Outcome: Progressing  Goal: Maintains/Returns to pre admission functional level  Description: INTERVENTIONS:  - Perform AM-PAC 6 Click Basic Mobility/ Daily Activity assessment daily.  - Set and communicate daily mobility goal to care team and patient/family/caregiver.   - Collaborate with rehabilitation services on mobility goals if consulted  - Perform Range of Motion  times a day.  - Reposition patient every  hours.  - Dangle patient  times a day  - Stand patient  times a day  - Ambulate patient  times a day  - Out of bed to chair  times a day   - Out of bed for meals  times a day  - Out of bed for toileting  - Record patient progress and toleration of activity level   Outcome: Progressing     Problem: DISCHARGE PLANNING  Goal: Discharge to home or other facility with appropriate resources  Description: INTERVENTIONS:  - Identify barriers to discharge w/patient and caregiver  - Arrange for  needed discharge resources and transportation as appropriate  - Identify discharge learning needs (meds, wound care, etc.)  - Arrange for interpretive services to assist at discharge as needed  - Refer to Case Management Department for coordinating discharge planning if the patient needs post-hospital services based on physician/advanced practitioner order or complex needs related to functional status, cognitive ability, or social support system  Outcome: Progressing     Problem: POSTPARTUM  Goal: Experiences normal postpartum course  Description: INTERVENTIONS:  - Monitor maternal vital signs  - Assess uterine involution and lochia  Outcome: Progressing  Goal: Appropriate maternal -  bonding  Description: INTERVENTIONS:  - Identify family support  - Assess for appropriate maternal/infant bonding   -Encourage maternal/infant bonding opportunities  - Referral to  or  as needed  Outcome: Progressing  Goal: Establishment of infant feeding pattern  Description: INTERVENTIONS:  - Assess breast/bottle feeding  - Refer to lactation as needed  Outcome: Progressing  Goal: Incision(s), wounds(s) or drain site(s) healing without S/S of infection  Description: INTERVENTIONS  - Assess and document dressing, incision, wound bed, drain sites and surrounding tissue  - Provide patient and family education  - Perform skin care/dressing changes every   Outcome: Progressing

## 2024-05-16 PROCEDURE — 99024 POSTOP FOLLOW-UP VISIT: CPT | Performed by: OBSTETRICS & GYNECOLOGY

## 2024-05-16 RX ORDER — NIFEDIPINE 30 MG/1
30 TABLET, EXTENDED RELEASE ORAL DAILY
Status: DISCONTINUED | OUTPATIENT
Start: 2024-05-16 | End: 2024-05-17

## 2024-05-16 RX ADMIN — ENOXAPARIN SODIUM 40 MG: 40 INJECTION SUBCUTANEOUS at 20:48

## 2024-05-16 RX ADMIN — ACETAMINOPHEN 650 MG: 325 TABLET, FILM COATED ORAL at 09:06

## 2024-05-16 RX ADMIN — NIFEDIPINE 60 MG: 30 TABLET, EXTENDED RELEASE ORAL at 09:06

## 2024-05-16 RX ADMIN — IBUPROFEN 600 MG: 600 TABLET, FILM COATED ORAL at 04:18

## 2024-05-16 RX ADMIN — ENOXAPARIN SODIUM 40 MG: 40 INJECTION SUBCUTANEOUS at 09:06

## 2024-05-16 RX ADMIN — SERTRALINE 100 MG: 100 TABLET, FILM COATED ORAL at 20:49

## 2024-05-16 RX ADMIN — IBUPROFEN 600 MG: 600 TABLET, FILM COATED ORAL at 13:19

## 2024-05-16 RX ADMIN — IBUPROFEN 600 MG: 600 TABLET, FILM COATED ORAL at 20:48

## 2024-05-16 RX ADMIN — NIFEDIPINE 30 MG: 30 TABLET, EXTENDED RELEASE ORAL at 20:48

## 2024-05-16 RX ADMIN — SENNOSIDES 8.6 MG: 8.6 TABLET, FILM COATED ORAL at 09:06

## 2024-05-16 NOTE — PROGRESS NOTES
Progress Note - OB/GYN   Vision Greg Sykes 24 y.o. female MRN: [id number]  Unit/Bed#:  323-01 Encounter: [id number]      Assessment/Plan    Jules Sykes is a 24 y.o.  who is PPD 3 s/p CCS at 31w1d     Delivery by classical  section  Assessment & Plan  Routine postpartum care  Encourage ambulation  Encourage familial bonding  Lactation support as needed  Pain: Tylenol around the clock, oxycodone if needed  Postpartum Contraceptive plan: considering POPs  Pre-delivery Hgb 11.2 --> Post Delivery 10.5  Voiding spontaneously, passing flatus  DVT Ppx: ambulation, SCDs, Lovenox 40mg BID    Acute kidney injury (HCC)  Assessment & Plan  Cr 1.01 on POD0 in evening -> 0.9  Continue to trend creatinine  Plan to restart motrin for pain control    Rubella nonimmune status, delivered, current hospitalization  Assessment & Plan  Recommend vaccination postpartum     Nonimmune to hepatitis B virus  Assessment & Plan  Recommend vaccination postpartum    Insufficient prenatal care in second trimester  Assessment & Plan  No routine OB care from 20w to 29w1d.     Consider case management consult postpartum    Depression  Assessment & Plan  Home Zoloft ordered    Generalized anxiety disorder  Assessment & Plan  Home Zoloft ordered    * Preeclampsia  Assessment & Plan  Patient presented with DFM then had sustained SRBP in L&D triage and thereby met criteria for preE w/ SF.  S/p Procardia IR 10 mg PO, Labetalol 20/40/80 mg IV, and Hydralazine 5 mg IV in L&D triage    Systolic (24hrs), Av , Min:136 , Max:198   Diastolic (24hrs), Av, Min:72, Max:97    CBC & CMP wnl  UP:C 9.33    Monitor BP  S/p magnesium gtt  Trend labs  Procardia 30 mg Xl qd started , increased to Procardia 60 mg XL 5/15  Anticipating increasing to Procardia 90 mg XL            Disposition: Anticipate discharge home postpartum Day #4  Barriers to discharge: blood pressures      Subjective/Objective     Subjective: Postpartum  state    Pain: no  Tolerating PO: yes  Voiding: yes  Flatus: yes  BM: yes  Ambulating: yes  Breastfeeding: Bottle feeding and Using breast pump  Chest pain: no  Shortness of breath: no  Leg pain: no  Lochia: minimal    Objective:     Vitals:  Vitals:    05/16/24 0001 05/16/24 0408 05/16/24 0409 05/16/24 0418   BP: 139/72 (!) 198/86 (!) 176/97 159/82   BP Location: Right arm Left arm Right arm Right arm   Pulse: 72  64    Resp: 16  17    Temp: 98.6 °F (37 °C)  98.2 °F (36.8 °C)    TempSrc: Oral  Oral    SpO2: 100%  99%    Weight:       Height:           Physical Exam:   GEN: appears well, alert and oriented x 3, pleasant and cooperative   CV: Regular rate  RESP: non labored breathing  ABDOMEN: soft, no tenderness, no distention, Uterine fundus firm and non-tender, -2 cm below the umbilicus, incision c/d/i  EXTREMITIES: non-tender  NEURO Alert and oriented to person, place, and time.       Lab Results   Component Value Date    WBC 17.45 (H) 05/14/2024    HGB 9.8 (L) 05/14/2024    HCT 31.3 (L) 05/14/2024    MCV 87 05/14/2024     05/14/2024         Hamida Majano MD  Obstetrics & Gynecology  05/16/24

## 2024-05-16 NOTE — PLAN OF CARE
Problem: PAIN - ADULT  Goal: Verbalizes/displays adequate comfort level or baseline comfort level  Description: Interventions:  - Encourage patient to monitor pain and request assistance  - Assess pain using appropriate pain scale  - Administer analgesics based on type and severity of pain and evaluate response  - Implement non-pharmacological measures as appropriate and evaluate response  - Consider cultural and social influences on pain and pain management  - Notify physician/advanced practitioner if interventions unsuccessful or patient reports new pain  Outcome: Progressing     Problem: INFECTION - ADULT  Goal: Absence or prevention of progression during hospitalization  Description: INTERVENTIONS:  - Assess and monitor for signs and symptoms of infection  - Monitor lab/diagnostic results  - Monitor all insertion sites, i.e. indwelling lines, tubes, and drains  - Monitor endotracheal if appropriate and nasal secretions for changes in amount and color  - [place] appropriate cooling/warming therapies per order  - Administer medications as ordered  - Instruct and encourage patient and family to use good hand hygiene technique  - Identify and instruct in appropriate isolation precautions for identified infection/condition  Outcome: Progressing  Goal: Absence of fever/infection during neutropenic period  Description: INTERVENTIONS:  - Monitor WBC    Outcome: Progressing     Problem: SAFETY ADULT  Goal: Patient will remain free of falls  Description: INTERVENTIONS:  - Educate patient/family on patient safety including physical limitations  - Instruct patient to call for assistance with activity   - Consult OT/PT to assist with strengthening/mobility   - Keep Call bell within reach  - Keep bed low and locked with side rails adjusted as appropriate  - Keep care items and personal belongings within reach  - Initiate and maintain comfort rounds  - Make Fall Risk Sign visible to staff  - Offer Toileting   -  Initiate/Maintain alarm  - Obtain necessary fall risk management equipment:   - Apply yellow socks and bracelet for high fall risk patients  - Consider moving patient to room near nurses station  Outcome: Progressing  Goal: Maintain or return to baseline ADL function  Description: INTERVENTIONS:  -  Assess patient's ability to carry out ADLs; assess patient's baseline for ADL function and identify physical deficits which impact ability to perform ADLs (bathing, care of mouth/teeth, toileting, grooming, dressing, etc.)  - Assess/evaluate cause of self-care deficits   - Assess range of motion  - Assess patient's mobility; develop plan if impaired  - Assess patient's need for assistive devices and provide as appropriate  - Encourage maximum independence but intervene and supervise when necessary  - Involve family in performance of ADLs  - Assess for home care needs following discharge   - Consider OT consult to assist with ADL evaluation and planning for discharge  - Provide patient education as appropriate  Outcome: Progressing  Goal: Maintains/Returns to pre admission functional level  Description: INTERVENTIONS:  - Perform AM-PAC 6 Click Basic Mobility/ Daily Activity assessment daily.  - Set and communicate daily mobility goal to care team and patient/family/caregiver.   - Collaborate with rehabilitation services on mobility goals if consulted  - Perform Range of Motion  - Reposition patient  - Dangle patient   - Stand patient  - Ambulate patient   - Out of bed to chair   - Out of bed for meals   - Out of bed for toileting  - Record patient progress and toleration of activity level   Outcome: Progressing     Problem: DISCHARGE PLANNING  Goal: Discharge to home or other facility with appropriate resources  Description: INTERVENTIONS:  - Identify barriers to discharge w/patient and caregiver  - Arrange for needed discharge resources and transportation as appropriate  - Identify discharge learning needs (meds, wound  care, etc.)  - Arrange for interpretive services to assist at discharge as needed  - Refer to Case Management Department for coordinating discharge planning if the patient needs post-hospital services based on physician/advanced practitioner order or complex needs related to functional status, cognitive ability, or social support system  Outcome: Progressing     Problem: POSTPARTUM  Goal: Experiences normal postpartum course  Description: INTERVENTIONS:  - Monitor maternal vital signs  - Assess uterine involution and lochia  Outcome: Progressing  Goal: Appropriate maternal -  bonding  Description: INTERVENTIONS:  - Identify family support  - Assess for appropriate maternal/infant bonding   -Encourage maternal/infant bonding opportunities  - Referral to  or  as needed  Outcome: Progressing  Goal: Establishment of infant feeding pattern  Description: INTERVENTIONS:  - Assess breast/bottle feeding  - Refer to lactation as needed  Outcome: Progressing  Goal: Incision(s), wounds(s) or drain site(s) healing without S/S of infection  Description: INTERVENTIONS  - Assess and document dressing, incision, wound bed, drain sites and surrounding tissue  - Provide patient and family education  - Perform skin care/dressing changes   Outcome: Progressing

## 2024-05-16 NOTE — PLAN OF CARE
Problem: PAIN - ADULT  Goal: Verbalizes/displays adequate comfort level or baseline comfort level  Description: Interventions:  - Encourage patient to monitor pain and request assistance  - Assess pain using appropriate pain scale  - Administer analgesics based on type and severity of pain and evaluate response  - Implement non-pharmacological measures as appropriate and evaluate response  - Consider cultural and social influences on pain and pain management  - Notify physician/advanced practitioner if interventions unsuccessful or patient reports new pain  Outcome: Progressing     Problem: INFECTION - ADULT  Goal: Absence or prevention of progression during hospitalization  Description: INTERVENTIONS:  - Assess and monitor for signs and symptoms of infection  - Monitor lab/diagnostic results  - Monitor all insertion sites, i.e. indwelling lines, tubes, and drains  - Monitor endotracheal if appropriate and nasal secretions for changes in amount and color  - [place] appropriate cooling/warming therapies per order  - Administer medications as ordered  - Instruct and encourage patient and family to use good hand hygiene technique  - Identify and instruct in appropriate isolation precautions for identified infection/condition  Outcome: Progressing  Goal: Absence of fever/infection during neutropenic period  Description: INTERVENTIONS:  - Monitor WBC    Outcome: Progressing     Problem: SAFETY ADULT  Goal: Patient will remain free of falls  Description: INTERVENTIONS:  - Educate patient/family on patient safety including physical limitations  - Instruct patient to call for assistance with activity   - Consult OT/PT to assist with strengthening/mobility   - Keep Call bell within reach  - Keep bed low and locked with side rails adjusted as appropriate  - Keep care items and personal belongings within reach  - Initiate and maintain comfort rounds  - Make Fall Risk Sign visible to staff  - Apply yellow socks and bracelet  for high fall risk patients  - Consider moving patient to room near nurses station  Outcome: Progressing  Goal: Maintain or return to baseline ADL function  Description: INTERVENTIONS:  -  Assess patient's ability to carry out ADLs; assess patient's baseline for ADL function and identify physical deficits which impact ability to perform ADLs (bathing, care of mouth/teeth, toileting, grooming, dressing, etc.)  - Assess/evaluate cause of self-care deficits   - Assess range of motion  - Assess patient's mobility; develop plan if impaired  - Assess patient's need for assistive devices and provide as appropriate  - Encourage maximum independence but intervene and supervise when necessary  - Involve family in performance of ADLs  - Assess for home care needs following discharge   - Consider OT consult to assist with ADL evaluation and planning for discharge  - Provide patient education as appropriate  Outcome: Progressing  Goal: Maintains/Returns to pre admission functional level  Description: INTERVENTIONS:  - Perform AM-PAC 6 Click Basic Mobility/ Daily Activity assessment daily.  - Set and communicate daily mobility goal to care team and patient/family/caregiver.   - Collaborate with rehabilitation services on mobility goals if consulted  -- Record patient progress and toleration of activity level   Outcome: Progressing     Problem: DISCHARGE PLANNING  Goal: Discharge to home or other facility with appropriate resources  Description: INTERVENTIONS:  - Identify barriers to discharge w/patient and caregiver  - Arrange for needed discharge resources and transportation as appropriate  - Identify discharge learning needs (meds, wound care, etc.)  - Arrange for interpretive services to assist at discharge as needed  - Refer to Case Management Department for coordinating discharge planning if the patient needs post-hospital services based on physician/advanced practitioner order or complex needs related to functional status,  cognitive ability, or social support system  Outcome: Progressing     Problem: POSTPARTUM  Goal: Experiences normal postpartum course  Description: INTERVENTIONS:  - Monitor maternal vital signs  - Assess uterine involution and lochia  Outcome: Progressing  Goal: Appropriate maternal -  bonding  Description: INTERVENTIONS:  - Identify family support  - Assess for appropriate maternal/infant bonding   -Encourage maternal/infant bonding opportunities  - Referral to  or  as needed  Outcome: Progressing  Goal: Establishment of infant feeding pattern  Description: INTERVENTIONS:  - Assess breast/bottle feeding  - Refer to lactation as needed  Outcome: Progressing  Goal: Incision(s), wounds(s) or drain site(s) healing without S/S of infection  Description: INTERVENTIONS  - Assess and document dressing, incision, wound bed, drain sites and surrounding tissue  - Provide patient and family education    Outcome: Progressing

## 2024-05-17 LAB
ATRIAL RATE: 98 BPM
BASOPHILS # BLD AUTO: 0.04 THOUSANDS/ÂΜL (ref 0–0.1)
BASOPHILS NFR BLD AUTO: 0 % (ref 0–1)
EOSINOPHIL # BLD AUTO: 0.21 THOUSAND/ÂΜL (ref 0–0.61)
EOSINOPHIL NFR BLD AUTO: 2 % (ref 0–6)
ERYTHROCYTE [DISTWIDTH] IN BLOOD BY AUTOMATED COUNT: 15.9 % (ref 11.6–15.1)
HCT VFR BLD AUTO: 35.1 % (ref 34.8–46.1)
HGB BLD-MCNC: 11.1 G/DL (ref 11.5–15.4)
IMM GRANULOCYTES # BLD AUTO: 0.14 THOUSAND/UL (ref 0–0.2)
IMM GRANULOCYTES NFR BLD AUTO: 1 % (ref 0–2)
LYMPHOCYTES # BLD AUTO: 2.78 THOUSANDS/ÂΜL (ref 0.6–4.47)
LYMPHOCYTES NFR BLD AUTO: 24 % (ref 14–44)
MCH RBC QN AUTO: 27.7 PG (ref 26.8–34.3)
MCHC RBC AUTO-ENTMCNC: 31.6 G/DL (ref 31.4–37.4)
MCV RBC AUTO: 88 FL (ref 82–98)
MONOCYTES # BLD AUTO: 0.6 THOUSAND/ÂΜL (ref 0.17–1.22)
MONOCYTES NFR BLD AUTO: 5 % (ref 4–12)
NEUTROPHILS # BLD AUTO: 7.87 THOUSANDS/ÂΜL (ref 1.85–7.62)
NEUTS SEG NFR BLD AUTO: 68 % (ref 43–75)
NRBC BLD AUTO-RTO: 0 /100 WBCS
P AXIS: 48 DEGREES
PLATELET # BLD AUTO: 237 THOUSANDS/UL (ref 149–390)
PMV BLD AUTO: 11 FL (ref 8.9–12.7)
PR INTERVAL: 126 MS
QRS AXIS: 75 DEGREES
QRSD INTERVAL: 74 MS
QT INTERVAL: 344 MS
QTC INTERVAL: 439 MS
RBC # BLD AUTO: 4.01 MILLION/UL (ref 3.81–5.12)
T WAVE AXIS: 34 DEGREES
VENTRICULAR RATE: 98 BPM
WBC # BLD AUTO: 11.64 THOUSAND/UL (ref 4.31–10.16)

## 2024-05-17 PROCEDURE — 85025 COMPLETE CBC W/AUTO DIFF WBC: CPT

## 2024-05-17 PROCEDURE — 99232 SBSQ HOSP IP/OBS MODERATE 35: CPT | Performed by: OBSTETRICS & GYNECOLOGY

## 2024-05-17 PROCEDURE — 93005 ELECTROCARDIOGRAM TRACING: CPT

## 2024-05-17 RX ORDER — POLYETHYLENE GLYCOL 3350 17 G/17G
17 POWDER, FOR SOLUTION ORAL DAILY PRN
Status: DISCONTINUED | OUTPATIENT
Start: 2024-05-17 | End: 2024-05-18 | Stop reason: HOSPADM

## 2024-05-17 RX ORDER — NIFEDIPINE 30 MG/1
90 TABLET, EXTENDED RELEASE ORAL DAILY
Status: DISCONTINUED | OUTPATIENT
Start: 2024-05-17 | End: 2024-05-18 | Stop reason: HOSPADM

## 2024-05-17 RX ORDER — ACETAMINOPHEN 325 MG/1
975 TABLET ORAL EVERY 6 HOURS PRN
Status: DISCONTINUED | OUTPATIENT
Start: 2024-05-17 | End: 2024-05-18 | Stop reason: HOSPADM

## 2024-05-17 RX ORDER — SENNOSIDES 8.6 MG
1 TABLET ORAL DAILY PRN
Status: DISCONTINUED | OUTPATIENT
Start: 2024-05-17 | End: 2024-05-18 | Stop reason: HOSPADM

## 2024-05-17 RX ADMIN — NIFEDIPINE 90 MG: 30 TABLET, FILM COATED, EXTENDED RELEASE ORAL at 10:46

## 2024-05-17 RX ADMIN — ENOXAPARIN SODIUM 40 MG: 40 INJECTION SUBCUTANEOUS at 10:46

## 2024-05-17 RX ADMIN — IBUPROFEN 600 MG: 600 TABLET, FILM COATED ORAL at 10:48

## 2024-05-17 RX ADMIN — ENOXAPARIN SODIUM 40 MG: 40 INJECTION SUBCUTANEOUS at 20:41

## 2024-05-17 RX ADMIN — ACETAMINOPHEN 975 MG: 325 TABLET, FILM COATED ORAL at 20:41

## 2024-05-17 RX ADMIN — SERTRALINE 100 MG: 100 TABLET, FILM COATED ORAL at 22:41

## 2024-05-17 NOTE — PLAN OF CARE
Problem: PAIN - ADULT  Goal: Verbalizes/displays adequate comfort level or baseline comfort level  Description: Interventions:  - Encourage patient to monitor pain and request assistance  - Assess pain using appropriate pain scale  - Administer analgesics based on type and severity of pain and evaluate response  - Implement non-pharmacological measures as appropriate and evaluate response  - Consider cultural and social influences on pain and pain management  - Notify physician/advanced practitioner if interventions unsuccessful or patient reports new pain  Outcome: Progressing     Problem: INFECTION - ADULT  Goal: Absence or prevention of progression during hospitalization  Description: INTERVENTIONS:  - Assess and monitor for signs and symptoms of infection  - Monitor lab/diagnostic results  - Monitor all insertion sites, i.e. indwelling lines, tubes, and drains  - Monitor endotracheal if appropriate and nasal secretions for changes in amount and color  - [place] appropriate cooling/warming therapies per order  - Administer medications as ordered  - Instruct and encourage patient and family to use good hand hygiene technique  - Identify and instruct in appropriate isolation precautions for identified infection/condition  Outcome: Progressing  Goal: Absence of fever/infection during neutropenic period  Description: INTERVENTIONS:  - Monitor WBC    Outcome: Progressing     Problem: SAFETY ADULT  Goal: Patient will remain free of falls  Description: INTERVENTIONS:  - Educate patient/family on patient safety including physical limitations  - Instruct patient to call for assistance with activity   - Consult OT/PT to assist with strengthening/mobility   - Keep Call bell within reach  - Keep bed low and locked with side rails adjusted as appropriate  - Keep care items and personal belongings within reach  - Initiate and maintain comfort rounds  - Make Fall Risk Sign visible to staff  - Offer Toileting every  Hours,  in advance of need  - Initiate/Maintain alarm  - Obtain necessary fall risk management equipment:   - Apply yellow socks and bracelet for high fall risk patients  - Consider moving patient to room near nurses station  Outcome: Progressing  Goal: Maintain or return to baseline ADL function  Description: INTERVENTIONS:  -  Assess patient's ability to carry out ADLs; assess patient's baseline for ADL function and identify physical deficits which impact ability to perform ADLs (bathing, care of mouth/teeth, toileting, grooming, dressing, etc.)  - Assess/evaluate cause of self-care deficits   - Assess range of motion  - Assess patient's mobility; develop plan if impaired  - Assess patient's need for assistive devices and provide as appropriate  - Encourage maximum independence but intervene and supervise when necessary  - Involve family in performance of ADLs  - Assess for home care needs following discharge   - Consider OT consult to assist with ADL evaluation and planning for discharge  - Provide patient education as appropriate  Outcome: Progressing  Goal: Maintains/Returns to pre admission functional level  Description: INTERVENTIONS:  - Perform AM-PAC 6 Click Basic Mobility/ Daily Activity assessment daily.  - Set and communicate daily mobility goal to care team and patient/family/caregiver.   - Collaborate with rehabilitation services on mobility goals if consulted  - Perform Range of Motion  times a day.  - Reposition patient every  hours.  - Dangle patient  times a day  - Stand patient  times a day  - Ambulate patient  times a day  - Out of bed to chair  times a day   - Out of bed for meals  times a day  - Out of bed for toileting  - Record patient progress and toleration of activity level   Outcome: Progressing     Problem: DISCHARGE PLANNING  Goal: Discharge to home or other facility with appropriate resources  Description: INTERVENTIONS:  - Identify barriers to discharge w/patient and caregiver  - Arrange for  needed discharge resources and transportation as appropriate  - Identify discharge learning needs (meds, wound care, etc.)  - Arrange for interpretive services to assist at discharge as needed  - Refer to Case Management Department for coordinating discharge planning if the patient needs post-hospital services based on physician/advanced practitioner order or complex needs related to functional status, cognitive ability, or social support system  Outcome: Progressing     Problem: POSTPARTUM  Goal: Experiences normal postpartum course  Description: INTERVENTIONS:  - Monitor maternal vital signs  - Assess uterine involution and lochia  Outcome: Progressing  Goal: Appropriate maternal -  bonding  Description: INTERVENTIONS:  - Identify family support  - Assess for appropriate maternal/infant bonding   -Encourage maternal/infant bonding opportunities  - Referral to  or  as needed  Outcome: Progressing  Goal: Establishment of infant feeding pattern  Description: INTERVENTIONS:  - Assess breast/bottle feeding  - Refer to lactation as needed  Outcome: Progressing  Goal: Incision(s), wounds(s) or drain site(s) healing without S/S of infection  Description: INTERVENTIONS  - Assess and document dressing, incision, wound bed, drain sites and surrounding tissue  - Provide patient and family education  - Perform skin care/dressing changes every   Outcome: Progressing

## 2024-05-17 NOTE — PROGRESS NOTES
Obstetrics Progress Note  Vision Greg Sykes 24 y.o. female MRN: [id number]  Unit/Bed#:  323-01 Encounter: [id number]    Assessment/Plan:  Postoperative day #4 s/p  classical  delivery. Stable.  Baby in NICU. By issue:  Delivery by classical  section  Assessment & Plan  Routine postpartum care  Encourage ambulation  Encourage familial bonding  Lactation support as needed  Pain: Tylenol around the clock, oxycodone if needed  Postpartum Contraceptive plan: considering POPs as a bridge to nexplanon vs IUD  Pre-delivery Hgb 11.2 --> Post Delivery 10.5  Voiding spontaneously, passing flatus  DVT Ppx: ambulation, SCDs, Lovenox 40mg BID    * Preeclampsia  Assessment & Plan  Patient presented with DFM then had sustained SRBP in L&D triage and thereby met criteria for preE w/ SF.  S/p Procardia IR 10 mg PO, Labetalol 20/40/80 mg IV, and Hydralazine 5 mg IV in L&D triage    Systolic (24hrs), Av , Min:134 , Max:159   Diastolic (24hrs), Av, Min:83, Max:97    CBC & CMP wnl  UP:C 9.33    Monitor BP  Asymptomatic  S/p magnesium gtt  Trend labs  Procardia 30 mg Xl qd started 5/, increased to Procardia 60 mg XL 5/15  Anticipating increasing to Procardia 90 mg XL 5/17    Acute kidney injury (HCC)  Assessment & Plan  Cr 1.01 on POD0 in evening -> 0.9  Continue to trend creatinine  Plan to restart motrin for pain control    Rubella nonimmune status, delivered, current hospitalization  Assessment & Plan  Recommend vaccination postpartum     Nonimmune to hepatitis B virus  Assessment & Plan  Recommend vaccination postpartum    Insufficient prenatal care in second trimester  Assessment & Plan  No routine OB care from 20w to 29w1d.     Consider case management consult postpartum    Depression  Assessment & Plan  Home Zoloft ordered    Generalized anxiety disorder  Assessment & Plan  Home Zoloft ordered      Anticipate discharge POD #4 vs #5.      Subjective/Objective   Chief Complaint:   Post delivery.      Subjective:   Pain: well controlled. Tolerating PO: yes. Voiding: yes. Flatus: yes. Bowel Movement: yes. Ambulating: yes. Chest pain: no. Shortness of breath: no. Leg pain: no. Lochia: within normal limits. Infant feeding: breast. Denies headache, vision changes, RUQ pain.    Objective:   Vitals:   Temp:  [98.1 °F (36.7 °C)-98.4 °F (36.9 °C)] 98.3 °F (36.8 °C)  HR:  [67-80] 67  Resp:  [15-18] 18  BP: (134-159)/(83-97) 145/83   No intake or output data in the 24 hours ending 05/17/24 0622    Physical Exam:   -General: alert and oriented x 3, in no apparent distress  -Cardiovascular: regular rate and rhythm  -Pulmonary: normal effort, clear to auscultation bilaterally  -Abdomen/Pelvis: soft, non-tender, non-distended, no rebound or guarding. Uterine fundus firm and non-tender, -2 cm below the umbilicus. Incision: CDI  -Extremities: Nontender    Lab Results   Component Value Date    WBC 17.45 (H) 05/14/2024    HGB 9.8 (L) 05/14/2024    HCT 31.3 (L) 05/14/2024    MCV 87 05/14/2024     05/14/2024         Dilcia Winter MD  PGY-I, OBGYN  5/17/2024, 6:22 AM

## 2024-05-17 NOTE — PLAN OF CARE
Problem: PAIN - ADULT  Goal: Verbalizes/displays adequate comfort level or baseline comfort level  Description: Interventions:  - Encourage patient to monitor pain and request assistance  - Assess pain using appropriate pain scale  - Administer analgesics based on type and severity of pain and evaluate response  - Implement non-pharmacological measures as appropriate and evaluate response  - Consider cultural and social influences on pain and pain management  - Notify physician/advanced practitioner if interventions unsuccessful or patient reports new pain  Outcome: Progressing     Problem: INFECTION - ADULT  Goal: Absence or prevention of progression during hospitalization  Description: INTERVENTIONS:  - Assess and monitor for signs and symptoms of infection  - Monitor lab/diagnostic results  - Monitor all insertion sites, i.e. indwelling lines, tubes, and drains  - Monitor endotracheal if appropriate and nasal secretions for changes in amount and color  - [place] appropriate cooling/warming therapies per order  - Administer medications as ordered  - Instruct and encourage patient and family to use good hand hygiene technique  - Identify and instruct in appropriate isolation precautions for identified infection/condition  Outcome: Progressing  Goal: Absence of fever/infection during neutropenic period  Description: INTERVENTIONS:  - Monitor WBC    Outcome: Progressing     Problem: SAFETY ADULT  Goal: Patient will remain free of falls  Description: INTERVENTIONS:  - Educate patient/family on patient safety including physical limitations  - Instruct patient to call for assistance with activity   - Consult OT/PT to assist with strengthening/mobility   - Keep Call bell within reach  - Keep bed low and locked with side rails adjusted as appropriate  - Keep care items and personal belongings within reach  - Initiate and maintain comfort rounds  - Make Fall Risk Sign visible to staff  - Offer Toileting every  Hours,  in advance of need  - Initiate/Maintain alarm  - Obtain necessary fall risk management equipment:   - Apply yellow socks and bracelet for high fall risk patients  - Consider moving patient to room near nurses station  Outcome: Progressing  Goal: Maintain or return to baseline ADL function  Description: INTERVENTIONS:  -  Assess patient's ability to carry out ADLs; assess patient's baseline for ADL function and identify physical deficits which impact ability to perform ADLs (bathing, care of mouth/teeth, toileting, grooming, dressing, etc.)  - Assess/evaluate cause of self-care deficits   - Assess range of motion  - Assess patient's mobility; develop plan if impaired  - Assess patient's need for assistive devices and provide as appropriate  - Encourage maximum independence but intervene and supervise when necessary  - Involve family in performance of ADLs  - Assess for home care needs following discharge   - Consider OT consult to assist with ADL evaluation and planning for discharge  - Provide patient education as appropriate  Outcome: Progressing  Goal: Maintains/Returns to pre admission functional level  Description: INTERVENTIONS:  - Perform AM-PAC 6 Click Basic Mobility/ Daily Activity assessment daily.  - Set and communicate daily mobility goal to care team and patient/family/caregiver.   - Collaborate with rehabilitation services on mobility goals if consulted  - Perform Range of Motion  times a day.  - Reposition patient every  hours.  - Dangle patient  times a day  - Stand patient  times a day  - Ambulate patient  times a day  - Out of bed to chair  times a day   - Out of bed for meals  times a day  - Out of bed for toileting  - Record patient progress and toleration of activity level   Outcome: Progressing     Problem: DISCHARGE PLANNING  Goal: Discharge to home or other facility with appropriate resources  Description: INTERVENTIONS:  - Identify barriers to discharge w/patient and caregiver  - Arrange for  needed discharge resources and transportation as appropriate  - Identify discharge learning needs (meds, wound care, etc.)  - Arrange for interpretive services to assist at discharge as needed  - Refer to Case Management Department for coordinating discharge planning if the patient needs post-hospital services based on physician/advanced practitioner order or complex needs related to functional status, cognitive ability, or social support system  Outcome: Progressing     Problem: POSTPARTUM  Goal: Experiences normal postpartum course  Description: INTERVENTIONS:  - Monitor maternal vital signs  - Assess uterine involution and lochia  Outcome: Progressing  Goal: Appropriate maternal -  bonding  Description: INTERVENTIONS:  - Identify family support  - Assess for appropriate maternal/infant bonding   -Encourage maternal/infant bonding opportunities  - Referral to  or  as needed  Outcome: Progressing  Goal: Establishment of infant feeding pattern  Description: INTERVENTIONS:  - Assess breast/bottle feeding  - Refer to lactation as needed  Outcome: Progressing  Goal: Incision(s), wounds(s) or drain site(s) healing without S/S of infection  Description: INTERVENTIONS  - Assess and document dressing, incision, wound bed, drain sites and surrounding tissue  - Provide patient and family education  - Perform skin care/dressing changes every   Outcome: Progressing

## 2024-05-17 NOTE — LACTATION NOTE
This note was copied from a baby's chart.  Follow up - mom called for help with pumping.     Mom has extra-large, pendulous breasts with large, dark areolas and everted nipples that face downward. Visible edema in areola and breast noted.     Demonstrated warmth, massage, hand pump to elongate the nipple, reverse pressure softening, and hands on pumping techniques.     Demonstration of cycling the pump.    Demonstration and teach back of massage, breast compression with FOB.     Mom expressed  over 20 mls out of the left breast and 10 mls out of the right breast during this pumping session.    Enc. And reassurance provided    Use of ice for edema after the session demonstrated. Use of heat with heat pads prior to the next session discussed with heat pads provided.    Enc. To use all 5 senses and collection of baby's saliva to assist in milk production.     Mom has transitioning milk, with milk appearing more white than yellow.    Enc.t o call lactation for NICU support of s2s, first latch, and milk supply.    Mom has a s2 in the room    Pumping:   - When pumping, begin in stimulation mode (high cycle, low vacuum) until milk begins to express. Change pump to expression mode (low cycle, high vacuum). Use hands on pumping techniques to assist with milk transfer. When milk stops expressing, change back to stimulation mode. When milk begins to flow, change to expression mode. You make cycle pump up to three times in a pumping session.    Discussed s/s engorgement, blocked milk ducts, and mastitis. Discussed how to remedy at home and when to contact physician. Reviewed engorgement and ways to reduce symptoms. Use a warmth on breasts with massage prior to feeding / pumping. Use massage during pumping over full ducts. Used cold, compression on breasts after feeding/pumping session. Ideas are rice in a sock. Place one in the freezer for cool and one in the microwave for warmth. Referred to discharge book / engorgement  page.    Education of breastfeeding with large breasts. Demonstration and teach back of positioning and alignment. Use pillows, tables, rolled towels/blankets to lift breast. Lift baby up to breast level. Education on hand expression prior to latch, positioning of hand to compress the breast, and positioning and alignment of baby for deep latch with large breasts.           Discharge feeding plan for NICU Pumping     Set alarms to pump every 2 hrs during the day and every 3 hrs at night  Use massage, warmth, & hand expression to stimulate glandular tissue prior to pumping.  May use nipple cream/butter/oil where tunnel and funnel meet on flange to assist movement of breast tissue inside the flange  Use breast compressions, hands on pumping techniques to assist in expressing milk    Cycle pumping - Begin the pump in stimulation mode. Once milk is visible in the tunnel of the flange, change pump setting to expression mode. Once milk is NOT seen in the tunnel, cycle back to stimulation mode.Continue cycle pumping until end of feeding.    Pump both breasts simultaneously  Use all 5 senses when pumping to increase milk transfer.  Store expressed milk or feed expressed milk via syringe, NG tube or paced bottle feeding method.   Continue to hand express between feeds to stimulate the breasts frequently    Review Milkmob on youtube or scan QR code for MilkMob video  When with baby, place baby skin to skin. Attempt to latch when medically cleared.         Milk Mob

## 2024-05-17 NOTE — PROGRESS NOTES
Vitals:    05/17/24 1921   BP: 138/87   Pulse: (!) 123   Resp: 20   Temp: 98 °F (36.7 °C)   SpO2: 99%     Patient seen and examined for concern of headache, tachycardia, and flushing  Patient reports standing up quickly from bed and feeling lightheaded, flushed, and onset of headache  Immediately sat down with slow improvement in symptoms  Denies chest pain, acute shortness of breath, nausea, vomiting, RUQ/epigastric pain, or acute swelling  Minimal vaginal bleeding, eating and hydrating well  Cardiopulmonary exam benign, RRR, CTA bilaterally  Abdomen non-tender, uterine at umbilicus  Plan for CBC and ECG for further assessment  Likely vasovagal episode, reports similar episodes while standing during pregnancy  Recommend slow transition to standing  Continue to trend blood pressures per protocol      Physical Exam  Vitals and nursing note reviewed. Exam conducted with a chaperone present.   Constitutional:       General: She is not in acute distress.  HENT:      Head: Normocephalic.      Right Ear: External ear normal.      Left Ear: External ear normal.   Eyes:      General: No scleral icterus.        Right eye: No discharge.         Left eye: No discharge.      Conjunctiva/sclera: Conjunctivae normal.   Cardiovascular:      Rate and Rhythm: Normal rate and regular rhythm.      Pulses: Normal pulses.      Heart sounds: Normal heart sounds.   Pulmonary:      Effort: Pulmonary effort is normal. No respiratory distress.      Breath sounds: Normal breath sounds.   Abdominal:      General: Abdomen is flat. There is no distension.      Palpations: Abdomen is soft.      Tenderness: There is no abdominal tenderness. There is no guarding.   Musculoskeletal:         General: No swelling or tenderness. Normal range of motion.      Cervical back: Normal range of motion.      Right lower leg: No edema.      Left lower leg: No edema.   Skin:     General: Skin is warm and dry.      Capillary Refill: Capillary refill takes less  than 2 seconds.   Neurological:      Mental Status: She is alert and oriented to person, place, and time. Mental status is at baseline.   Psychiatric:         Mood and Affect: Mood normal.         Behavior: Behavior normal.           James Rodriguez MD  OB/GYN PGY-3

## 2024-05-18 VITALS
BODY MASS INDEX: 41.72 KG/M2 | HEART RATE: 94 BPM | OXYGEN SATURATION: 99 % | SYSTOLIC BLOOD PRESSURE: 142 MMHG | RESPIRATION RATE: 18 BRPM | TEMPERATURE: 98 F | DIASTOLIC BLOOD PRESSURE: 88 MMHG | HEIGHT: 61 IN | WEIGHT: 221 LBS

## 2024-05-18 PROBLEM — O09.32 INSUFFICIENT PRENATAL CARE IN SECOND TRIMESTER: Status: RESOLVED | Noted: 2024-04-26 | Resolved: 2024-05-18

## 2024-05-18 PROBLEM — O36.8190 DECREASED FETAL MOVEMENT: Status: RESOLVED | Noted: 2024-05-12 | Resolved: 2024-05-18

## 2024-05-18 PROBLEM — Z3A.31 31 WEEKS GESTATION OF PREGNANCY: Status: RESOLVED | Noted: 2024-01-04 | Resolved: 2024-05-18

## 2024-05-18 RX ORDER — IBUPROFEN 600 MG/1
600 TABLET ORAL EVERY 6 HOURS PRN
Qty: 30 TABLET | Refills: 0 | Status: SHIPPED | OUTPATIENT
Start: 2024-05-18

## 2024-05-18 RX ORDER — NIFEDIPINE 30 MG/1
90 TABLET, EXTENDED RELEASE ORAL DAILY
Qty: 30 TABLET | Refills: 0 | Status: SHIPPED | OUTPATIENT
Start: 2024-05-19 | End: 2024-05-26 | Stop reason: SDUPTHER

## 2024-05-18 RX ADMIN — ACETAMINOPHEN 975 MG: 325 TABLET, FILM COATED ORAL at 02:06

## 2024-05-18 RX ADMIN — ACETAMINOPHEN 975 MG: 325 TABLET, FILM COATED ORAL at 10:46

## 2024-05-18 RX ADMIN — NIFEDIPINE 90 MG: 30 TABLET, FILM COATED, EXTENDED RELEASE ORAL at 09:12

## 2024-05-18 RX ADMIN — ENOXAPARIN SODIUM 40 MG: 40 INJECTION SUBCUTANEOUS at 09:13

## 2024-05-18 NOTE — CASE MANAGEMENT
Case Management Progress Note    Patient name Jules Sykes  Location /-01 MRN [id number]  : 2000 Date 2024       LOS (days): 6  Geometric Mean LOS (GMLOS) (days):   Days to GMLOS:        OBJECTIVE:        Current admission status: Inpatient  Preferred Pharmacy:   [id number] [place] - [address]  [address] [facility] [address]  Phone: [phone] Fax: [phone]    Primary Care Provider: Almaz Massey DO    [place] [facility] [facility]  Secondary Insurance:     PROGRESS NOTE:    Cm met with MOB to give update that Cm has not received application yet. MOB will send later. MOB requesting to stay a little later in the day due to her home being cleaned and cannot go in it yet. CM updated RN.

## 2024-05-18 NOTE — PLAN OF CARE
Problem: PAIN - ADULT  Goal: Verbalizes/displays adequate comfort level or baseline comfort level  Description: Interventions:  - Encourage patient to monitor pain and request assistance  - Assess pain using appropriate pain scale  - Administer analgesics based on type and severity of pain and evaluate response  - Implement non-pharmacological measures as appropriate and evaluate response  - Consider cultural and social influences on pain and pain management  - Notify physician/advanced practitioner if interventions unsuccessful or patient reports new pain  Outcome: Progressing     Problem: INFECTION - ADULT  Goal: Absence or prevention of progression during hospitalization  Description: INTERVENTIONS:  - Assess and monitor for signs and symptoms of infection  - Monitor lab/diagnostic results  - Monitor all insertion sites, i.e. indwelling lines, tubes, and drains  - Monitor endotracheal if appropriate and nasal secretions for changes in amount and color  - [place] appropriate cooling/warming therapies per order  - Administer medications as ordered  - Instruct and encourage patient and family to use good hand hygiene technique  - Identify and instruct in appropriate isolation precautions for identified infection/condition  Outcome: Progressing  Goal: Absence of fever/infection during neutropenic period  Description: INTERVENTIONS:  - Monitor WBC    Outcome: Progressing     Problem: SAFETY ADULT  Goal: Patient will remain free of falls  Description: INTERVENTIONS:  - Educate patient/family on patient safety including physical limitations  - Instruct patient to call for assistance with activity   - Consult OT/PT to assist with strengthening/mobility   - Keep Call bell within reach  - Keep bed low and locked with side rails adjusted as appropriate  - Keep care items and personal belongings within reach  - Initiate and maintain comfort rounds  - Make Fall Risk Sign visible to staff  - Apply yellow socks and bracelet  for high fall risk patients  - Consider moving patient to room near nurses station  Outcome: Progressing  Goal: Maintain or return to baseline ADL function  Description: INTERVENTIONS:  -  Assess patient's ability to carry out ADLs; assess patient's baseline for ADL function and identify physical deficits which impact ability to perform ADLs (bathing, care of mouth/teeth, toileting, grooming, dressing, etc.)  - Assess/evaluate cause of self-care deficits   - Assess range of motion  - Assess patient's mobility; develop plan if impaired  - Assess patient's need for assistive devices and provide as appropriate  - Encourage maximum independence but intervene and supervise when necessary  - Involve family in performance of ADLs  - Assess for home care needs following discharge   - Consider OT consult to assist with ADL evaluation and planning for discharge  - Provide patient education as appropriate  Outcome: Progressing  Goal: Maintains/Returns to pre admission functional level  Description: INTERVENTIONS:  - Perform AM-PAC 6 Click Basic Mobility/ Daily Activity assessment daily.  - Set and communicate daily mobility goal to care team and patient/family/caregiver.   - Collaborate with rehabilitation services on mobility goals if consulted  - Out of bed for toileting  - Record patient progress and toleration of activity level   Outcome: Progressing     Problem: DISCHARGE PLANNING  Goal: Discharge to home or other facility with appropriate resources  Description: INTERVENTIONS:  - Identify barriers to discharge w/patient and caregiver  - Arrange for needed discharge resources and transportation as appropriate  - Identify discharge learning needs (meds, wound care, etc.)  - Arrange for interpretive services to assist at discharge as needed  - Refer to Case Management Department for coordinating discharge planning if the patient needs post-hospital services based on physician/advanced practitioner order or complex needs  related to functional status, cognitive ability, or social support system  Outcome: Progressing     Problem: POSTPARTUM  Goal: Experiences normal postpartum course  Description: INTERVENTIONS:  - Monitor maternal vital signs  - Assess uterine involution and lochia  Outcome: Progressing  Goal: Appropriate maternal -  bonding  Description: INTERVENTIONS:  - Identify family support  - Assess for appropriate maternal/infant bonding   -Encourage maternal/infant bonding opportunities  - Referral to  or  as needed  Outcome: Progressing  Goal: Establishment of infant feeding pattern  Description: INTERVENTIONS:  - Assess breast/bottle feeding  - Refer to lactation as needed  Outcome: Progressing  Goal: Incision(s), wounds(s) or drain site(s) healing without S/S of infection  Description: INTERVENTIONS  - Assess and document dressing, incision, wound bed, drain sites and surrounding tissue  - Provide patient and family education  Outcome: Progressing      decreased jackie

## 2024-05-18 NOTE — PROGRESS NOTES
Patient assessed at bedside due to elevated EPDS score of 15. She states she is feeling stressed and anxious at the thought of leaving her baby in the NICU when she is discharge home later today, but believes this is normal. She is generally overwhelmed at the thought of going home without her baby, but knows that she will be able to visit and endorses having a strong support system at home; partner is at the bedside and corroborates this. She currently denies any suicidal or homicidal ideation, and denies any visual or auditory hallucinations.    Of note, she already has established care with a psychiatrist and takes Zoloft. She was previously evaluated in the ED during this pregnancy for dissociative symptoms after an increase in her Zoloft dosage, at which time her dosage was decreased and she was given resources and a referral to a postpartum therapist. Counseled that this places her at increased risk of postpartum depression, which she understands.    At this time, she is motivated to follow-up and establish care with the therapist and will continue to follow with her psychiatrist as an outpatient.  She was provided a pamphlet re:  Depression and information re: the [facility]'s postpartum support group. Counseled to return to the hospital if she begins to have thoughts about harming herself or others. She expressed understanding and will f/u as indicated. Stable for d/c home.    Magaly Calvert MD  PGY-1 OBGYN

## 2024-05-18 NOTE — PROGRESS NOTES
Obstetrics Progress Note  Vision Greg Sykes 24 y.o. female MRN: [id number]  Unit/Bed#:  323-01 Encounter: [id number]    Assessment/Plan:    Postpartum Day #5 s/p 1CCS (preeclampsia with severe features, category 2 FHT) postpartum course complicated by requirement for titration of antihypertensive medication, currently stable. Baby in NICU. By issue:    Delivery by classical  section  Assessment & Plan  Routine postpartum care  Encourage ambulation  Encourage familial bonding  Lactation support as needed  Pain: Tylenol around the clock, oxycodone if needed  Postpartum Contraceptive plan: considering POPs as a bridge to nexplanon vs IUD  Pre-delivery Hgb 11.2 --> Post Delivery 10.5  Voiding spontaneously, passing flatus  DVT Ppx: ambulation, SCDs, Lovenox 40mg BID    Acute kidney injury (HCC)  Assessment & Plan  Cr 1.01 on POD0 in evening -> 0.9  Continue to trend creatinine  Plan to restart motrin for pain control    Rubella nonimmune status, delivered, current hospitalization  Assessment & Plan  Recommend vaccination postpartum     Nonimmune to hepatitis B virus  Assessment & Plan  Recommend vaccination postpartum    Insufficient prenatal care in second trimester  Assessment & Plan  No routine OB care from 20w to 29w1d.     Consider case management consult postpartum    Depression  Assessment & Plan  Home Zoloft ordered    Generalized anxiety disorder  Assessment & Plan  Home Zoloft ordered    * Preeclampsia  Assessment & Plan  Patient presented with DFM then had sustained SRBP in L&D triage and thereby met criteria for preE w/ SF.  S/p Procardia IR 10 mg PO, Labetalol 20/40/80 mg IV, and Hydralazine 5 mg IV in L&D triage    Systolic (24hrs), Av , Min:126 , Max:147   Diastolic (24hrs), Av, Min:81, Max:92    CBC & CMP wnl  UP:C 9.33    Monitor BP  Asymptomatic  S/p magnesium gtt  Trend labs  Procardia 30 mg Xl qd started , increased to Procardia 60 mg XL 5/15 --> increaed to Procardia XL 90  mg 5/17 5/18: continue to trend BP and monitor headache  Consider 5 day course of Lasix 20mg        Subjective/Objective     Subjective:   No acute events overnight. Her only complaint at this time is mild 4/10 headache, which has greatly improved from when it started yesterday. She denies any changes in vision, dizziness, chest pain, difficulty breathing, RUQ pain. Pain: controlled. Tolerating PO: yes. Voiding: yes. Flatus: passing. Ambulating: yes. Chest pain: no. Shortness of breath: no. Leg pain: no. Lochia: within normal limits. Baby in NICU.    Objective:   Vitals:   Temp:  [97.5 °F (36.4 °C)-98.2 °F (36.8 °C)] 98.2 °F (36.8 °C)  HR:  [] 94  Resp:  [17-20] 18  BP: (126-147)/(81-92) 126/81     No intake or output data in the 24 hours ending 05/18/24 0855    Lab Results   Component Value Date    WBC 11.64 (H) 05/17/2024    HGB 11.1 (L) 05/17/2024    HCT 35.1 05/17/2024    MCV 88 05/17/2024     05/17/2024       Physical Exam:   General: alert and oriented x3, in no apparent distress  Cardiovascular: regular rate  Pulmonary: normal effort  Abdomen: Soft, non-tender, non-distended, no rebound or guarding. Uterine fundus firm and non-tender, at the umbilicus  Incision: clean/dry/intact  Extremities: Non tender with minimal edema      Magaly Calvert MD  PGY-I, OBGYN  5/18/2024, 8:55 AM

## 2024-05-18 NOTE — PLAN OF CARE
Problem: PAIN - ADULT  Goal: Verbalizes/displays adequate comfort level or baseline comfort level  Description: Interventions:  - Encourage patient to monitor pain and request assistance  - Assess pain using appropriate pain scale  - Administer analgesics based on type and severity of pain and evaluate response  - Implement non-pharmacological measures as appropriate and evaluate response  - Consider cultural and social influences on pain and pain management  - Notify physician/advanced practitioner if interventions unsuccessful or patient reports new pain  5/18/2024 1122 by Phyllis Gorman RN  Outcome: Completed  5/18/2024 1021 by Phyllis Gorman RN  Outcome: Progressing     Problem: INFECTION - ADULT  Goal: Absence or prevention of progression during hospitalization  Description: INTERVENTIONS:  - Assess and monitor for signs and symptoms of infection  - Monitor lab/diagnostic results  - Monitor all insertion sites, i.e. indwelling lines, tubes, and drains  - Monitor endotracheal if appropriate and nasal secretions for changes in amount and color  - [place] appropriate cooling/warming therapies per order  - Administer medications as ordered  - Instruct and encourage patient and family to use good hand hygiene technique  - Identify and instruct in appropriate isolation precautions for identified infection/condition  5/18/2024 1122 by Phyllis Gorman RN  Outcome: Completed  5/18/2024 1021 by Phyllis Gorman RN  Outcome: Progressing  Goal: Absence of fever/infection during neutropenic period  Description: INTERVENTIONS:  - Monitor WBC    5/18/2024 1122 by Phyllis Gorman RN  Outcome: Completed  5/18/2024 1021 by Phyllis Gorman RN  Outcome: Progressing     Problem: SAFETY ADULT  Goal: Patient will remain free of falls  Description: INTERVENTIONS:  - Educate patient/family on patient safety including physical limitations  - Instruct patient to call for assistance with activity   - Consult OT/PT to  assist with strengthening/mobility   - Keep Call bell within reach  - Keep bed low and locked with side rails adjusted as appropriate  - Keep care items and personal belongings within reach  - Initiate and maintain comfort rounds  - Make Fall Risk Sign visible to staff  - Apply yellow socks and bracelet for high fall risk patients  - Consider moving patient to room near nurses station  5/18/2024 1122 by Phyllis oGrman RN  Outcome: Completed  5/18/2024 1021 by Phyllis Gorman RN  Outcome: Progressing  Goal: Maintain or return to baseline ADL function  Description: INTERVENTIONS:  -  Assess patient's ability to carry out ADLs; assess patient's baseline for ADL function and identify physical deficits which impact ability to perform ADLs (bathing, care of mouth/teeth, toileting, grooming, dressing, etc.)  - Assess/evaluate cause of self-care deficits   - Assess range of motion  - Assess patient's mobility; develop plan if impaired  - Assess patient's need for assistive devices and provide as appropriate  - Encourage maximum independence but intervene and supervise when necessary  - Involve family in performance of ADLs  - Assess for home care needs following discharge   - Consider OT consult to assist with ADL evaluation and planning for discharge  - Provide patient education as appropriate  5/18/2024 1122 by Phyllis Gorman RN  Outcome: Completed  5/18/2024 1021 by Phyllis Gorman RN  Outcome: Progressing  Goal: Maintains/Returns to pre admission functional level  Description: INTERVENTIONS:  - Perform AM-PAC 6 Click Basic Mobility/ Daily Activity assessment daily.  - Set and communicate daily mobility goal to care team and patient/family/caregiver.   - Collaborate with rehabilitation services on mobility goals if consulted  - Out of bed for toileting  - Record patient progress and toleration of activity level   5/18/2024 1122 by Phyllis Gorman RN  Outcome: Completed  5/18/2024 1021 by Phyllis Gorman  RN  Outcome: Progressing     Problem: DISCHARGE PLANNING  Goal: Discharge to home or other facility with appropriate resources  Description: INTERVENTIONS:  - Identify barriers to discharge w/patient and caregiver  - Arrange for needed discharge resources and transportation as appropriate  - Identify discharge learning needs (meds, wound care, etc.)  - Arrange for interpretive services to assist at discharge as needed  - Refer to Case Management Department for coordinating discharge planning if the patient needs post-hospital services based on physician/advanced practitioner order or complex needs related to functional status, cognitive ability, or social support system  2024 1122 by Phyllis Gorman RN  Outcome: Completed  2024 1021 by Phyllis Gorman RN  Outcome: Progressing     Problem: POSTPARTUM  Goal: Experiences normal postpartum course  Description: INTERVENTIONS:  - Monitor maternal vital signs  - Assess uterine involution and lochia  2024 1122 by Phyllis Gorman RN  Outcome: Completed  2024 1021 by Phyllis Gorman RN  Outcome: Progressing  Goal: Appropriate maternal -  bonding  Description: INTERVENTIONS:  - Identify family support  - Assess for appropriate maternal/infant bonding   -Encourage maternal/infant bonding opportunities  - Referral to  or  as needed  2024 1122 by Phyllis Gorman RN  Outcome: Completed  2024 1021 by Phyllis Gorman RN  Outcome: Progressing  Goal: Establishment of infant feeding pattern  Description: INTERVENTIONS:  - Assess breast/bottle feeding  - Refer to lactation as needed  2024 1122 by Phyllis Gorman RN  Outcome: Completed  2024 1021 by Phyllis Gorman RN  Outcome: Progressing  Goal: Incision(s), wounds(s) or drain site(s) healing without S/S of infection  Description: INTERVENTIONS  - Assess and document dressing, incision, wound bed, drain sites and surrounding tissue  - Provide patient  and family education  5/18/2024 1122 by Phyllis Gorman RN  Outcome: Completed  5/18/2024 1021 by Phyllis Gorman, RN  Outcome: Progressing

## 2024-05-18 NOTE — CASE MANAGEMENT
Case Management Progress Note    Patient name Jules Sykes  Location /-01 MRN [id number]  : 2000 Date 2024       LOS (days): 6  Geometric Mean LOS (GMLOS) (days):   Days to GMLOS:        OBJECTIVE:        Current admission status: Inpatient  Preferred Pharmacy:   [id number] [place] - [address]  [address] [facility] [address]  Phone: [phone] Fax: [phone]    Primary Care Provider: Almaz Massey DO    Primary Insurance: [employer]  Secondary Insurance:     PROGRESS NOTE:    Cm advised MOB would like to meet with Cm.  Cm met with MOB and FOB at bedside. MOB has questions regarding gas cards. Cm advised MOB that Cm submitted information to [employer] and they would be the ones emailing gas cards. Cm advised MOB that it may take a few days to receive. MOB also has questions regarding WIC. Per WIC they need a card with babies name, weight, and height. Cm advised MOB that a footprint card should be in NICU with baby that has that information and that can be sent to WIC. CM advised MOB to ask NICU nurse because the card may be in basinet or with nurse. MOB plans to touch base with NICU nurse. MOB also completed [employer] application and needs CM to finish Case management part. MOB to email CM application for CM to complete and send in.

## 2024-05-20 LAB
ATRIAL RATE: 98 BPM
P AXIS: 48 DEGREES
PR INTERVAL: 126 MS
QRS AXIS: 75 DEGREES
QRSD INTERVAL: 74 MS
QT INTERVAL: 344 MS
QTC INTERVAL: 439 MS
T WAVE AXIS: 34 DEGREES
VENTRICULAR RATE: 98 BPM

## 2024-05-20 PROCEDURE — 93010 ELECTROCARDIOGRAM REPORT: CPT | Performed by: INTERNAL MEDICINE

## 2024-05-20 NOTE — UTILIZATION REVIEW
"Mother  discharged on 2024   Baby in NICU      NOTIFICATION OF INPATIENT ADMISSION   MATERNITY/DELIVERY AUTHORIZATION REQUEST   SERVICING FACILITY:   [facility] - L&D, , NICU  [address] [place] [address]  Tax ID: [id number]  NPI: [id number]   ATTENDING PROVIDER:  Attending Name and NPI#: Jaja Spain Md [[id number]]  Address: [address] [place] [address]  Phone: [phone]   ADMISSION INFORMATION:  Place of Service: [facility]patient [facility]  Place of Service Code: 21  Inpatient Admission Date/Time: 24  7:59 PM  Discharge Date/Time: 2024  5:20 PM  Admitting Diagnosis Code/Description:  Decreased fetal movement [O36.8190]  Encounter for  delivery without indication [O82]     Mother: Jules Sykes 2000 Estimated Date of Delivery: 24  Delivering clinician: Jaja Spain   OB History          1    Para   1    Term   0       1    AB   0    Living   1         SAB   0    IAB   0    Ectopic   0    Multiple   0    Live Births   1                Name & MRN:   Information for the patient's :  Erin Sykes Girl (Vision) [[id number]]    Delivery Information:  Sex: female  Delivered 2024 7:54 AM by , Classical; Gestational Age: 31w1d    Tyler Measurements:  Weight: 2 lb 10.8 oz (1214 g);  Height: 15.35\"    APGAR 1 minute 5 minutes 10 minutes   Totals: 3 8       UTILIZATION REVIEW CONTACT:  Tammie Pickering Utilization   Network Utilization Review Department  Phone: [phone]  Fax [phone]  Email: [email or web address]  Contact for approvals/pending authorizations, clinical reviews, and discharge.     PHYSICIAN ADVISORY SERVICES:  Medical Necessity Denial & Ujye-nt-Ivvs Review  Phone: [phone]  Fax: [phone]  Email: [email or web address]     DISCHARGE SUPPORT TEAM:  For Patients Discharge Needs & " Updates  Phone: [phone] opt. 2 Fax: [phone]  Email: [email or web address]

## 2024-05-21 ENCOUNTER — TELEPHONE (OUTPATIENT)
Dept: PSYCHIATRY | Facility: CLINIC | Age: 24
End: 2024-05-21

## 2024-05-21 NOTE — TELEPHONE ENCOUNTER
Called back the patient over the phone and she reports that she is doing better after delivery and she is currently taking Zoloft 150 mg discussed with her how to limit the amount of medication excreted in her breast milk and asked her to schedule follow-up in 1 or 2 weeks after control of her blood pressure.

## 2024-05-22 ENCOUNTER — HOSPITAL ENCOUNTER (EMERGENCY)
Facility: HOSPITAL | Age: 24
Discharge: HOME | End: 2024-05-22
Attending: EMERGENCY MEDICINE
Payer: MEDICARE

## 2024-05-22 VITALS
RESPIRATION RATE: 16 BRPM | HEIGHT: 61 IN | BODY MASS INDEX: 41.72 KG/M2 | SYSTOLIC BLOOD PRESSURE: 136 MMHG | WEIGHT: 221 LBS | OXYGEN SATURATION: 98 % | HEART RATE: 85 BPM | TEMPERATURE: 98.5 F | DIASTOLIC BLOOD PRESSURE: 87 MMHG

## 2024-05-22 DIAGNOSIS — O14.90 PRE-ECLAMPSIA, ANTEPARTUM: Primary | Chronic | ICD-10-CM

## 2024-05-22 DIAGNOSIS — R51.9 HEADACHE: ICD-10-CM

## 2024-05-22 LAB
ALBUMIN SERPL BCP-MCNC: 3.7 G/DL (ref 3.5–5)
ALP SERPL-CCNC: 160 U/L (ref 34–104)
ALT SERPL W P-5'-P-CCNC: 54 U/L (ref 7–52)
ANION GAP SERPL CALCULATED.3IONS-SCNC: 9 MMOL/L (ref 4–13)
AST SERPL W P-5'-P-CCNC: 28 U/L (ref 13–39)
ATRIAL RATE: 87 BPM
BASOPHILS # BLD AUTO: 0.04 THOUSANDS/ÂΜL (ref 0–0.1)
BASOPHILS NFR BLD AUTO: 0 % (ref 0–1)
BILIRUB SERPL-MCNC: 0.26 MG/DL (ref 0.2–1)
BUN SERPL-MCNC: 18 MG/DL (ref 5–25)
CALCIUM SERPL-MCNC: 8.5 MG/DL (ref 8.4–10.2)
CARDIAC TROPONIN I PNL SERPL HS: 4 NG/L
CHLORIDE SERPL-SCNC: 109 MMOL/L (ref 96–108)
CO2 SERPL-SCNC: 21 MMOL/L (ref 21–32)
CREAT SERPL-MCNC: 0.7 MG/DL (ref 0.6–1.3)
CREAT UR-MCNC: 138.5 MG/DL
EOSINOPHIL # BLD AUTO: 0.1 THOUSAND/ÂΜL (ref 0–0.61)
EOSINOPHIL NFR BLD AUTO: 1 % (ref 0–6)
ERYTHROCYTE [DISTWIDTH] IN BLOOD BY AUTOMATED COUNT: 15.7 % (ref 11.6–15.1)
GFR SERPL CREATININE-BSD FRML MDRD: 122 ML/MIN/1.73SQ M
GLUCOSE SERPL-MCNC: 93 MG/DL (ref 65–140)
HCT VFR BLD AUTO: 38.6 % (ref 34.8–46.1)
HGB BLD-MCNC: 11.9 G/DL (ref 11.5–15.4)
IMM GRANULOCYTES # BLD AUTO: 0.07 THOUSAND/UL (ref 0–0.2)
IMM GRANULOCYTES NFR BLD AUTO: 1 % (ref 0–2)
LYMPHOCYTES # BLD AUTO: 2.02 THOUSANDS/ÂΜL (ref 0.6–4.47)
LYMPHOCYTES NFR BLD AUTO: 22 % (ref 14–44)
MAGNESIUM SERPL-MCNC: 1.9 MG/DL (ref 1.9–2.7)
MCH RBC QN AUTO: 27.2 PG (ref 26.8–34.3)
MCHC RBC AUTO-ENTMCNC: 30.8 G/DL (ref 31.4–37.4)
MCV RBC AUTO: 88 FL (ref 82–98)
MONOCYTES # BLD AUTO: 0.51 THOUSAND/ÂΜL (ref 0.17–1.22)
MONOCYTES NFR BLD AUTO: 5 % (ref 4–12)
NEUTROPHILS # BLD AUTO: 6.66 THOUSANDS/ÂΜL (ref 1.85–7.62)
NEUTS SEG NFR BLD AUTO: 71 % (ref 43–75)
NRBC BLD AUTO-RTO: 0 /100 WBCS
P AXIS: 52 DEGREES
PLATELET # BLD AUTO: 304 THOUSANDS/UL (ref 149–390)
PMV BLD AUTO: 10.3 FL (ref 8.9–12.7)
POTASSIUM SERPL-SCNC: 4 MMOL/L (ref 3.5–5.3)
PR INTERVAL: 128 MS
PROT SERPL-MCNC: 7 G/DL (ref 6.4–8.4)
PROT UR-MCNC: 267 MG/DL
PROT/CREAT UR: 1.93 MG/G{CREAT} (ref 0–0.1)
QRS AXIS: 86 DEGREES
QRSD INTERVAL: 70 MS
QT INTERVAL: 358 MS
QTC INTERVAL: 430 MS
RBC # BLD AUTO: 4.38 MILLION/UL (ref 3.81–5.12)
SODIUM SERPL-SCNC: 139 MMOL/L (ref 135–147)
T WAVE AXIS: 48 DEGREES
VENTRICULAR RATE: 87 BPM
WBC # BLD AUTO: 9.4 THOUSAND/UL (ref 4.31–10.16)

## 2024-05-22 PROCEDURE — 99284 EMERGENCY DEPT VISIT MOD MDM: CPT | Performed by: EMERGENCY MEDICINE

## 2024-05-22 PROCEDURE — 80053 COMPREHEN METABOLIC PANEL: CPT | Performed by: EMERGENCY MEDICINE

## 2024-05-22 PROCEDURE — 99283 EMERGENCY DEPT VISIT LOW MDM: CPT

## 2024-05-22 PROCEDURE — 93005 ELECTROCARDIOGRAM TRACING: CPT

## 2024-05-22 PROCEDURE — 84156 ASSAY OF PROTEIN URINE: CPT | Performed by: EMERGENCY MEDICINE

## 2024-05-22 PROCEDURE — 82570 ASSAY OF URINE CREATININE: CPT | Performed by: EMERGENCY MEDICINE

## 2024-05-22 PROCEDURE — 84484 ASSAY OF TROPONIN QUANT: CPT | Performed by: EMERGENCY MEDICINE

## 2024-05-22 PROCEDURE — 85025 COMPLETE CBC W/AUTO DIFF WBC: CPT | Performed by: EMERGENCY MEDICINE

## 2024-05-22 PROCEDURE — 99243 OFF/OP CNSLTJ NEW/EST LOW 30: CPT | Performed by: OBSTETRICS & GYNECOLOGY

## 2024-05-22 PROCEDURE — 36415 COLL VENOUS BLD VENIPUNCTURE: CPT

## 2024-05-22 PROCEDURE — 96366 THER/PROPH/DIAG IV INF ADDON: CPT

## 2024-05-22 PROCEDURE — 96365 THER/PROPH/DIAG IV INF INIT: CPT

## 2024-05-22 PROCEDURE — 83735 ASSAY OF MAGNESIUM: CPT | Performed by: EMERGENCY MEDICINE

## 2024-05-22 RX ORDER — ACETAMINOPHEN 325 MG/1
975 TABLET ORAL ONCE
Status: COMPLETED | OUTPATIENT
Start: 2024-05-22 | End: 2024-05-22

## 2024-05-22 RX ADMIN — ACETAMINOPHEN 975 MG: 325 TABLET, FILM COATED ORAL at 11:30

## 2024-05-22 RX ADMIN — CAFFEINE AND SODIUM BENZOATE 500 MG: 125 INJECTION, SOLUTION INTRAMUSCULAR; INTRAVENOUS at 11:51

## 2024-05-22 NOTE — CONSULTS
Jules Sykes is a 24-year-old G1, P1 about 10 days post classical  section due to preeclampsia with severe features as well as category 2 fetal heart rate tracing at 31 weeks.  Patient presents to the ED today with frontal headache throbbing somewhat improved now after Tylenol treatment.  Patient has been pumping every 2 hours for baby in NICU.  Patient reports minimal bleeding no scotomata or epigastric pain, edema is much improved, voiding and bowel without difficulty.  Her  is trying to obtain blood pressure cuff for her to use at home but she has not been able to monitor blood pressures at home and has appointment in clinic tomorrow.    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 - has EKG done every 2 years    Memory changes 08/10/2018     Past Surgical History:   Procedure Laterality Date    NO PAST SURGERIES      RI  DELIVERY ONLY N/A 2024    Procedure:  SECTION ();  Surgeon: Jaja Spain MD;  Location: [facility] ;  Service: Obstetrics     OB History          1    Para   1    Term   0       1    AB   0    Living   1         SAB   0    IAB   0    Ectopic   0    Multiple   0    Live Births   1               Current Outpatient Medications   Medication Instructions    ibuprofen (MOTRIN) 600 mg, Oral, Every 6 hours PRN    NIFEdipine (PROCARDIA XL) 90 mg, Oral, Daily    Prenatal 27-1 MG TABS 1 tablet, Oral, Daily    sertraline (ZOLOFT) 150 mg, Oral, Daily     No Known Allergies  Social History     Tobacco Use    Smoking status: Never    Smokeless tobacco: Never   Vaping Use    Vaping status: Former    Substances: Nicotine   Substance Use Topics    Alcohol use: Not Currently     Alcohol/week: 1.0 standard drink of alcohol     Types: 1 Standard drinks or equivalent per week     Comment: social    Drug use: Not Currently      "Types: Marijuana     Comment: Last use      Vitals:    24 0955 24 1100   BP: 137/92 136/87   BP Location: Right arm Right arm   Pulse: 91 85   Resp: 17 16   Temp: 98.5 °F (36.9 °C)    TempSrc: Oral    SpO2: 100% 98%   Weight: 100 kg (221 lb)    Height: 5' 1\" (1.549 m)      Abdomen: soft non tender fundus firm non tender at u-2, incision clean dry intact, small firm area 1.5 cm towards left side of incision reviewed discussed massage when less tender, some mild hematoma/scar tissue expect will resolve.    A/P 25 yo  10 days post c/s bp controlled on procardia, does not appear to be preeclampsia exacerbation suspect is headache possible migraine type.  Seems to be resolving if improves can be discharged to be monitored outpatient, has office visit tomorrow  "

## 2024-05-22 NOTE — DISCHARGE INSTRUCTIONS
You were seen in the ED for a headache.  You had blood work, urine studies showing no concerning findings. You were diagnosed with headache, likely post spinal.  You have been discharged with instructions to follow-up with your OB/GYN as an outpatient.  Please follow up with your primary care physician within the next 1 week for continued management of your conditions.  Please come back to the ED if you develop uncontrolled pain, nausea vomiting, shortness of breath, loss of consciousness, chest pain, focal weakness, numbness or tingling, seizure-like activity. Thank you very much for utilizing the ED this afternoon.

## 2024-05-22 NOTE — ED PROVIDER NOTES
History  Chief Complaint   Patient presents with    Headache     Postpartum x1wk, did have preeclampsia. C/o headache & chest pain with dyspnea on exertion. Recently started on procardia.      This is a  24-year-old G1, P1, 10 days postpartum female whom delivered via  due to preeclampsia and nonreassuring fetal heart tones, presenting to the ED today for a complaint of a headache.  Patient states that her headache has been persistent since she delivered, but has worsened over the past 1 to 2 days.  She states that she has not had any blurry vision.  She was diagnosed with preeclampsia, has been taking her Procardia, labetalol as an outpatient and has not missed any doses.  She has not noticed that her blood pressure has been elevated.  She has not had any focal weakness, numbness or tingling, hearing abnormalities, vision abnormalities, trouble with her balance, loss of consciousness, or any other significantly associated symptoms.  Lights do bother her significantly.  She does not have a history of migraines previously.        Prior to Admission Medications   Prescriptions Last Dose Informant Patient Reported? Taking?   NIFEdipine (PROCARDIA XL) 30 mg 24 hr tablet   No No   Sig: Take 3 tablets (90 mg total) by mouth daily   Prenatal 27-1 MG TABS   No No   Sig: Take 1 tablet by mouth in the morning   ibuprofen (MOTRIN) 600 mg tablet   No No   Sig: Take 1 tablet (600 mg total) by mouth every 6 (six) hours as needed for moderate pain   sertraline (ZOLOFT) 100 mg tablet   No No   Sig: Take 1.5 tablets (150 mg total) by mouth daily      Facility-Administered Medications: None       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 - has EKG done every 2 years    Memory changes 08/10/2018       Past Surgical History:   Procedure Laterality Date    NO PAST SURGERIES      [place]  DELIVERY ONLY N/A  2024    Procedure:  SECTION ();  Surgeon: Jaja Spain MD;  Location: [facility] ;  Service: Obstetrics       Family History   Problem Relation Age of Onset    No Known Problems Mother     No Known Problems Father     Hypertension Sister     No Known Problems Sister     No Known Problems Sister     No Known Problems Brother     No Known Problems Brother     No Known Problems Brother     Hypertension Maternal Grandmother     Breast cancer Paternal Grandmother     Diabetes Paternal Grandmother     Cancer Family         colon     I have reviewed and agree with the history as documented.    E-Cigarette/Vaping    E-Cigarette Use Former User      E-Cigarette/Vaping Substances    Nicotine Yes     THC No     CBD No     Flavoring No     Other No     Unknown No      Social History     Tobacco Use    Smoking status: Never    Smokeless tobacco: Never   Vaping Use    Vaping status: Former    Substances: Nicotine   Substance Use Topics    Alcohol use: Not Currently     Alcohol/week: 1.0 standard drink of alcohol     Types: 1 Standard drinks or equivalent per week     Comment: social    Drug use: Not Currently     Types: Marijuana     Comment: Last use        Review of Systems   Constitutional:  Negative for activity change, chills and fever.   HENT:  Negative for congestion and rhinorrhea.    Eyes:  Negative for photophobia and visual disturbance.   Respiratory:  Negative for cough, chest tightness and shortness of breath.    Cardiovascular:  Negative for chest pain and leg swelling.   Gastrointestinal:  Negative for abdominal distention, nausea and vomiting.   Genitourinary:  Negative for dysuria and frequency.   Musculoskeletal:  Negative for back pain and neck stiffness.   Skin:  Negative for rash and wound.   Neurological:  Positive for headaches. Negative for dizziness and weakness.   Psychiatric/Behavioral:  Negative for agitation and suicidal ideas.        Physical Exam  Physical Exam  Vitals and  nursing note reviewed.   Constitutional:       General: She is not in acute distress.     Appearance: Normal appearance. She is obese. She is not ill-appearing, toxic-appearing or diaphoretic.   HENT:      Head: Normocephalic and atraumatic.      Right Ear: External ear normal.      Left Ear: External ear normal.      Nose: Nose normal. No congestion or rhinorrhea.      Mouth/Throat:      Mouth: Mucous membranes are moist.      Pharynx: Oropharynx is clear. No oropharyngeal exudate.   Eyes:      General: No scleral icterus.     Conjunctiva/sclera: Conjunctivae normal.   Cardiovascular:      Rate and Rhythm: Normal rate and regular rhythm.      Pulses: Normal pulses.      Heart sounds: Normal heart sounds. No murmur heard.     No gallop.   Pulmonary:      Effort: Pulmonary effort is normal. No respiratory distress.      Breath sounds: Normal breath sounds. No stridor. No wheezing or rhonchi.   Abdominal:      General: Abdomen is flat. Bowel sounds are normal. There is no distension.      Palpations: Abdomen is soft. There is no mass.      Tenderness: There is no abdominal tenderness.   Musculoskeletal:         General: No swelling or tenderness. Normal range of motion.      Cervical back: Normal range of motion and neck supple. No tenderness.      Right lower leg: No edema.      Left lower leg: No edema.   Skin:     General: Skin is warm and dry.      Capillary Refill: Capillary refill takes less than 2 seconds.      Coloration: Skin is not jaundiced.      Findings: No bruising.   Neurological:      General: No focal deficit present.      Mental Status: She is alert and oriented to person, place, and time. Mental status is at baseline.      Sensory: No sensory deficit.      Motor: No weakness.   Psychiatric:         Mood and Affect: Mood normal.         Behavior: Behavior normal.         Thought Content: Thought content normal.         Judgment: Judgment normal.         Vital Signs  ED Triage Vitals [05/22/24 0955]    Temperature Pulse Respirations Blood Pressure SpO2   98.5 °F (36.9 °C) 91 17 137/92 100 %      Temp Source Heart Rate Source Patient Position - Orthostatic VS BP Location FiO2 (%)   Oral Monitor Sitting Right arm --      Pain Score       --           Vitals:    05/22/24 0955 05/22/24 1100   BP: 137/92 136/87   Pulse: 91 85   Patient Position - Orthostatic VS: Sitting Sitting         Visual Acuity      ED Medications  Medications   acetaminophen (TYLENOL) tablet 975 mg (975 mg Oral Given 5/22/24 1130)   caffeine-sodium benzoate 500 mg in sodium chloride 0.9 % 1,000 mL IVPB (0 mg Intravenous Stopped 5/22/24 1334)       Diagnostic Studies  Results Reviewed       Procedure Component Value Units Date/Time    Protein / creatinine ratio, urine [[id number]]  (Abnormal) Collected: 05/22/24 1150    Lab Status: Final result Specimen: Urine, Clean Catch Updated: 05/22/24 1246     Creatinine, Ur 138.5 mg/dL      Protein Urine Random 267 mg/dL      Prot/Creat Ratio, Ur 1.93    Magnesium [[id number]]  (Normal) Collected: 05/22/24 1027    Lab Status: Final result Specimen: Blood from Arm, Right Updated: 05/22/24 1230     Magnesium 1.9 mg/dL     HS Troponin I 4hr [[id number]]     Lab Status: No result Specimen: Blood     HS Troponin I 2hr [[id number]]     Lab Status: No result Specimen: Blood     HS Troponin 0hr (reflex protocol) [[id number]]  (Normal) Collected: 05/22/24 1027    Lab Status: Final result Specimen: Blood from Arm, Right Updated: 05/22/24 1101     hs TnI 0hr 4 ng/L     Comprehensive metabolic panel [[id number]]  (Abnormal) Collected: 05/22/24 1027    Lab Status: Final result Specimen: Blood from Arm, Right Updated: 05/22/24 1054     Sodium 139 mmol/L      Potassium 4.0 mmol/L      Chloride 109 mmol/L      CO2 21 mmol/L      ANION GAP 9 mmol/L      BUN 18 mg/dL      Creatinine 0.70 mg/dL      Glucose 93 mg/dL      Calcium 8.5 mg/dL      AST 28 U/L      ALT 54 U/L      Alkaline Phosphatase 160 U/L      Total Protein 7.0  g/dL      Albumin 3.7 g/dL      Total Bilirubin 0.26 mg/dL      eGFR 122 ml/min/1.73sq m     Narrative:      National Kidney Disease Foundation guidelines for Chronic Kidney Disease (CKD):     Stage 1 with normal or high GFR (GFR > 90 mL/min/1.73 square meters)    Stage 2 Mild CKD (GFR = 60-89 mL/min/1.73 square meters)    Stage 3A Moderate CKD (GFR = 45-59 mL/min/1.73 square meters)    Stage 3B Moderate CKD (GFR = 30-44 mL/min/1.73 square meters)    Stage 4 Severe CKD (GFR = 15-29 mL/min/1.73 square meters)    Stage 5 End Stage CKD (GFR <15 mL/min/1.73 square meters)  Note: GFR calculation is accurate only with a steady state creatinine    CBC and differential [[id number]]  (Abnormal) Collected: 24 1027    Lab Status: Final result Specimen: Blood from Arm, Right Updated: 24 1037     WBC 9.40 Thousand/uL      RBC 4.38 Million/uL      Hemoglobin 11.9 g/dL      Hematocrit 38.6 %      MCV 88 fL      MCH 27.2 pg      MCHC 30.8 g/dL      RDW 15.7 %      MPV 10.3 fL      Platelets 304 Thousands/uL      nRBC 0 /100 WBCs      Segmented % 71 %      Immature Grans % 1 %      Lymphocytes % 22 %      Monocytes % 5 %      Eosinophils Relative 1 %      Basophils Relative 0 %      Absolute Neutrophils 6.66 Thousands/µL      Absolute Immature Grans 0.07 Thousand/uL      Absolute Lymphocytes 2.02 Thousands/µL      Absolute Monocytes 0.51 Thousand/µL      Eosinophils Absolute 0.10 Thousand/µL      Basophils Absolute 0.04 Thousands/µL                    No orders to display              Procedures  Procedures         ED Course  ED Course as of 24 1342   Wed May 22, 2024   1129 OB consulted.                                                 Medical Decision Making  This is a 24-year-old female presenting to the ED today for headache.  Her headache has been present for the past 1 to 2 days most significantly, but previously over the past 10 days since she delivered.  She had , received spinal anesthesia.  She  denies any other significantly associated symptoms aside from some photophobia.  Her exam otherwise is unremarkable.  Her blood pressure initially was slightly elevated and diastolic, 92, but it has since improved significantly.  She has not missed any doses of her blood pressure medications.  She was previously diagnosed with preeclampsia during her delivery, and had to have an emergency  due to nonreassuring fetal heart tones.  Her baby remains in the NICU currently.  Her differential diagnosis includes: Preeclampsia versus post spinal headache versus migraine headache versus other.  Patient had blood work here which was reassuring, and she had already been passing her lochia so her urine protein to creatinine ratio is unreliable.  We spoke with OB whom evaluated the patient, and without her having a significantly elevated blood pressure here they did not feel that she required any further workup or management from the ED.  She received caffeine here in the ED, Tylenol, with almost complete resolution of her symptoms.  She was p.o. challenged, ambulated, and requested to be discharged.  The management plan was discussed in detail with the patient at bedside and all questions were answered. Strict ED return instructions were discussed at bedside. Prior to discharge, both verbal and written instructions were provided. We discussed the signs and symptoms that should prompt the patient to return to the ED. All questions were answered and the patient was comfortable with the plan of care and discharged home. The patient agrees to return to the Emergency Department for concerns and/or progression of illness.    I did advise her that she may need to come back for a blood patch, and she will continue to monitor for signs and symptoms of persistent headache, and need for blood patch.  At this point in time we both discussed that, and agree that she likely does not need 1.    Amount and/or Complexity of Data  Reviewed  External Data Reviewed: labs, radiology, ECG and notes.  Labs: ordered.    Risk  OTC drugs.             Disposition  Final diagnoses:   Pre-eclampsia, antepartum   Headache     Time reflects when diagnosis was documented in both MDM as applicable and the Disposition within this note       Time User Action Codes Description Comment    5/22/2024 11:18 AM Alex Jennings [O14.90] Pre-eclampsia, antepartum     5/22/2024  1:33 PM Alex Jennings [R51.9] Headache           ED Disposition       ED Disposition   Discharge    Condition   Stable    Date/Time   Wed May 22, 2024  1:33 PM    Comment   Jules Sykes discharge to home/self care.                   Follow-up Information       Follow up With Specialties Details Why Contact Info    Almaz Massey, DO Internal Medicine Call in 1 day  [address] [facility] [address] [phone]              Patient's Medications   Discharge Prescriptions    No medications on file       No discharge procedures on file.    PDMP Review       None            ED Provider  Electronically Signed by             Alex Jennings MD  05/22/24 0584

## 2024-05-23 ENCOUNTER — OFFICE VISIT (OUTPATIENT)
Dept: OBGYN CLINIC | Facility: CLINIC | Age: 24
End: 2024-05-23

## 2024-05-23 VITALS
DIASTOLIC BLOOD PRESSURE: 89 MMHG | SYSTOLIC BLOOD PRESSURE: 147 MMHG | TEMPERATURE: 98.4 F | HEART RATE: 94 BPM | HEIGHT: 61 IN | BODY MASS INDEX: 38.71 KG/M2 | WEIGHT: 205 LBS

## 2024-05-23 DIAGNOSIS — O14.93 PRE-ECLAMPSIA IN THIRD TRIMESTER: Chronic | ICD-10-CM

## 2024-05-23 DIAGNOSIS — F41.1 GENERALIZED ANXIETY DISORDER: ICD-10-CM

## 2024-05-23 DIAGNOSIS — F32.A DEPRESSION, UNSPECIFIED DEPRESSION TYPE: ICD-10-CM

## 2024-05-23 DIAGNOSIS — Z98.891 HISTORY OF CLASSICAL CESAREAN SECTION: Primary | ICD-10-CM

## 2024-05-23 DIAGNOSIS — Z30.011 ENCOUNTER FOR INITIAL PRESCRIPTION OF CONTRACEPTIVE PILLS: ICD-10-CM

## 2024-05-23 DIAGNOSIS — Z87.39 HISTORY OF KAWASAKI'S DISEASE: ICD-10-CM

## 2024-05-23 PROBLEM — O99.891 BACK PAIN AFFECTING PREGNANCY IN SECOND TRIMESTER: Status: RESOLVED | Noted: 2024-03-01 | Resolved: 2024-05-23

## 2024-05-23 PROBLEM — M54.9 BACK PAIN AFFECTING PREGNANCY IN SECOND TRIMESTER: Status: RESOLVED | Noted: 2024-03-01 | Resolved: 2024-05-23

## 2024-05-23 RX ORDER — ADHESIVE BANDAGE 3/4"
BANDAGE TOPICAL ONCE
Qty: 1 EACH | Refills: 0 | Status: SHIPPED | OUTPATIENT
Start: 2024-05-23 | End: 2024-05-23

## 2024-05-23 RX ORDER — ACETAMINOPHEN AND CODEINE PHOSPHATE 120; 12 MG/5ML; MG/5ML
1 SOLUTION ORAL DAILY
Qty: 90 TABLET | Refills: 0 | Status: SHIPPED | OUTPATIENT
Start: 2024-05-23 | End: 2024-08-21

## 2024-05-23 NOTE — ASSESSMENT & PLAN NOTE
BP elevated but nonsevere, denies all symptoms of preE  Order placed for home BP cuff  Continue to monitor pressures closely and RTO in 2-3 weeks for follow up and BP check

## 2024-05-23 NOTE — PROGRESS NOTES
[facility]  2024  Postpartum Visit    Assessment/Plan  Problem List       Myopia    Flexural eczema    Generalized anxiety disorder    Overview     Maintained on Zoloft         Depression    Overview     Continue Zoloft, follow-up with psychiatrist         Herniated lumbar intervertebral disc    Overview     Would like to proceed with an anesthesia consult later in pregnancy         History of Kawasaki's disease    Preeclampsia (Chronic)    Overview     Current regimen is Procardia XL 90mg daily         Current Assessment & Plan     BP elevated but nonsevere, denies all symptoms of preE  Order placed for home BP cuff  Continue to monitor pressures closely and RTO in 2-3 weeks for follow up and BP check         Nonimmune to hepatitis B virus    Rubella nonimmune status, delivered, current hospitalization    History of classical  section    Acute kidney injury (HCC)    Encounter for initial prescription of contraceptive pills    Current Assessment & Plan     Script sent for micronor and info given about hormonal IUD              Subjective    Vision Greg Sykes is a 24 y.o.  who was initially admitted at 31w0d for a new diagnosis of preeclampsia with severe features.  Her severe features was sustained severe-range BP requiring acute treatment with Procardia IR 10 mg PO, Labetalol 20/40/80 mg IV, and Hydralazine 5 mg IV.  CBC and CMP were normal on admission, but UP:C was elevated at 9.33.  She received Betamethasone for fetal lung maturation.  She received magnesium for maternal seizure prophylaxis and for fetal neuroprotection. She then underwent 1CCS with apgars of 3 and 8. She delivered a baby girl with a birth weight of 1214g who was taken to NICU. Her postpartum course was complicated by multiple titrations of antihypertensive medication and an elevated EPDS of 15. She was discharged home on POD5. At that time she denied any SI/HI and expressed having a good support system  at home. She was sent home with prescription for Procardia XL 90 mg.       She is now POD#10 and has been meeting all postoperative milestones. She was seen in the ED yesterday due to a headache which improved s/p IVF and caffeine. Her BP was not elevated at that time. She has been eating a regular diet without difficulty. Bowel movements are normal. Pain is controlled without any medications. The patient is pumpinh.       The following portions of the patient's history were reviewed and updated as appropriate: allergies, current medications, past family history, past medical history, past social history, past surgical history, and problem list.    Depression Screening Follow-up Plan: Patient's depression screening was positive with an EDPS score of 17 however she reports feeling better than she did prior to delivery and denies SI/HI. Continue regular follow-up with their psychologist/therapist/psychiatrist who is managing their mental health condition(s).     Allergies  Patient has no known allergies.    Medications    Current Outpatient Medications:     Blood Pressure Monitoring (Blood Pressure Cuff) MISC, Use 1 (one) time for 1 dose, Disp: 1 each, Rfl: 0    NIFEdipine (PROCARDIA XL) 30 mg 24 hr tablet, Take 3 tablets (90 mg total) by mouth daily, Disp: 30 tablet, Rfl: 0    norethindrone (Ortho Micronor) 0.35 MG tablet, Take 1 tablet (0.35 mg total) by mouth daily, Disp: 90 tablet, Rfl: 0    Prenatal 27-1 MG TABS, Take 1 tablet by mouth in the morning, Disp: 30 tablet, Rfl: 11    sertraline (ZOLOFT) 100 mg tablet, Take 1.5 tablets (150 mg total) by mouth daily, Disp: , Rfl:     ibuprofen (MOTRIN) 600 mg tablet, Take 1 tablet (600 mg total) by mouth every 6 (six) hours as needed for moderate pain (Patient not taking: Reported on 5/23/2024), Disp: 30 tablet, Rfl: 0  No current facility-administered medications for this visit.      Review of Systems  Denies Fevers/chills/N/V/constipation/ excessive vaginal  "bleeding/excessive pain/dysuria/frequency of urination. Also as noted in HPI.      Objective     /89   Pulse 94   Temp 98.4 °F (36.9 °C) (Oral)   Ht 5' 1\" (1.549 m)   Wt 93 kg (205 lb)   LMP 10/08/2023 (Approximate)   Breastfeeding No Comment: Pumping baby in NICU  BMI 38.73 kg/m²       Physical Exam  General: alert and oriented, in no acute distress  CV: Regular rate  PULM: Non-labored respirations  MSK: Normal gait  Skin: Warm, dry  Neuro: No focal deficits  Psych: Normal affect and judgement, cooperative  Abdomen: soft, bowel sounds active, non-tender, soft non tender fundus at U-2   Incision: healing well, no drainage, no erythema, no hernia, no dehiscence, incision well approximated, small firm area 1.5 cm towards left side of incision    Marilyn Suarez MD   OB/Gyn PGY-3  12:47 PM  05/23/24    "

## 2024-05-24 ENCOUNTER — HOSPITAL ENCOUNTER (EMERGENCY)
Facility: HOSPITAL | Age: 24
Discharge: HOME | End: 2024-05-24
Attending: EMERGENCY MEDICINE
Payer: MEDICARE

## 2024-05-24 VITALS
SYSTOLIC BLOOD PRESSURE: 137 MMHG | DIASTOLIC BLOOD PRESSURE: 74 MMHG | HEART RATE: 90 BPM | OXYGEN SATURATION: 99 % | RESPIRATION RATE: 16 BRPM | TEMPERATURE: 98.2 F

## 2024-05-24 DIAGNOSIS — G97.1 POST-DURAL PUNCTURE HEADACHE: Primary | ICD-10-CM

## 2024-05-24 LAB
ATRIAL RATE: 104 BPM
P AXIS: 51 DEGREES
PR INTERVAL: 126 MS
QRS AXIS: 80 DEGREES
QRSD INTERVAL: 74 MS
QT INTERVAL: 342 MS
QTC INTERVAL: 449 MS
T WAVE AXIS: 37 DEGREES
VENTRICULAR RATE: 104 BPM

## 2024-05-24 PROCEDURE — 93005 ELECTROCARDIOGRAM TRACING: CPT

## 2024-05-24 PROCEDURE — 99284 EMERGENCY DEPT VISIT MOD MDM: CPT

## 2024-05-24 PROCEDURE — 99284 EMERGENCY DEPT VISIT MOD MDM: CPT | Performed by: EMERGENCY MEDICINE

## 2024-05-24 RX ORDER — BUTALBITAL, ASPIRIN, AND CAFFEINE 325; 50; 40 MG/1; MG/1; MG/1
1 CAPSULE ORAL EVERY 8 HOURS PRN
Qty: 8 CAPSULE | Refills: 0 | Status: SHIPPED | OUTPATIENT
Start: 2024-05-24 | End: 2024-06-03

## 2024-05-24 RX ORDER — BUTALBITAL, ACETAMINOPHEN AND CAFFEINE 50; 325; 40 MG/1; MG/1; MG/1
1 TABLET ORAL ONCE
Status: COMPLETED | OUTPATIENT
Start: 2024-05-24 | End: 2024-05-24

## 2024-05-24 RX ADMIN — BUTALBITAL, ACETAMINOPHEN, AND CAFFEINE 1 TABLET: 325; 50; 40 TABLET ORAL at 16:30

## 2024-05-24 NOTE — ED PROVIDER NOTES
History  Chief Complaint   Patient presents with    Headache     Pt reports onset of headache this morning, worsening, chest pain, heart racing, denies n/v, +photosensitivity, had  last week due to preeclampsia     Patient is a 24-year-old female with a history of preeclampsia and recent delivery at 31 weeks who presents with a headache.  Patient had a  on 2024.  She began with a headache 2 days later.  She was discharged on Procardia and has continued to take that daily.  She did take her dose this morning.  She was here 2 days ago with a headache and treated with IV fluids and IV caffeine.  She was discharged home once her headache resolved.  All of her headache returned and worsened this morning.  She describes minimal to no pain when laying recumbent and pain significantly worsens when she sits or stands.  She presented to the ED with concern for postdural puncture headache.  She admits to blurry vision.  She denies abdominal pain, vomiting, weakness, numbness, tingling, other concerns.  She was evaluated yesterday by her OB and had her dose of Procardia adjusted.      History provided by:  Patient  Headache  Pain location:  Generalized  Radiates to:  Does not radiate  Onset quality:  Gradual  Timing:  Constant  Progression:  Worsening  Chronicity:  New  Similar to prior headaches: yes    Relieved by: laying supine.  Exacerbated by: sitting, standing upright.  Associated symptoms: photophobia    Associated symptoms: no abdominal pain, no back pain, no cough, no diarrhea, no dizziness, no eye pain, no fever, no nausea, no neck pain, no neck stiffness, no numbness, no paresthesias, no seizures, no sore throat, no vomiting and no weakness        Prior to Admission Medications   Prescriptions Last Dose Informant Patient Reported? Taking?   Blood Pressure Monitoring (Blood Pressure Cuff) MISC   No No   Sig: Use 1 (one) time for 1 dose   NIFEdipine (PROCARDIA XL) 30 mg 24 hr tablet   No No    Sig: Take 3 tablets (90 mg total) by mouth daily   Prenatal 27-1 MG TABS   No No   Sig: Take 1 tablet by mouth in the morning   ibuprofen (MOTRIN) 600 mg tablet   No No   Sig: Take 1 tablet (600 mg total) by mouth every 6 (six) hours as needed for moderate pain   Patient not taking: Reported on 2024   norethindrone (Ortho Micronor) 0.35 MG tablet   No No   Sig: Take 1 tablet (0.35 mg total) by mouth daily   sertraline (ZOLOFT) 100 mg tablet   No No   Sig: Take 1.5 tablets (150 mg total) by mouth daily      Facility-Administered Medications: None       Past Medical History:   Diagnosis Date    Anxiety     Back pain affecting pregnancy in second trimester 2024    Delivery by classical  section 2024    Depression 2022    Herniated disc, cervical     2022    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HCC)     around age 3 or 4 - has EKG done every 2 years    Memory changes 08/10/2018       Past Surgical History:   Procedure Laterality Date    NO PAST SURGERIES      [place]  DELIVERY ONLY N/A 2024    Procedure:  SECTION ();  Surgeon: Jaja Spain MD;  Location: [facility] ;  Service: Obstetrics       Family History   Problem Relation Age of Onset    No Known Problems Mother     No Known Problems Father     Hypertension Sister     No Known Problems Sister     No Known Problems Sister     No Known Problems Brother     No Known Problems Brother     No Known Problems Brother     Hypertension Maternal Grandmother     Breast cancer Paternal Grandmother     Diabetes Paternal Grandmother     Cancer Family         colon     I have reviewed and agree with the history as documented.    E-Cigarette/Vaping    E-Cigarette Use Former User      E-Cigarette/Vaping Substances    Nicotine Yes     THC No     CBD No     Flavoring No     Other No     Unknown No      Social History     Tobacco Use    Smoking status: Never    Smokeless tobacco: Never   Vaping Use    Vaping status: Former    Substances:  Nicotine   Substance Use Topics    Alcohol use: Not Currently     Alcohol/week: 1.0 standard drink of alcohol     Types: 1 Standard drinks or equivalent per week     Comment: social    Drug use: Not Currently     Types: Marijuana     Comment: Last use 2022       Review of Systems   Constitutional:  Negative for chills, diaphoresis and fever.   HENT:  Negative for nosebleeds, sore throat and trouble swallowing.    Eyes:  Positive for photophobia. Negative for pain and visual disturbance.   Respiratory:  Negative for cough, chest tightness and shortness of breath.    Cardiovascular:  Negative for chest pain, palpitations and leg swelling.   Gastrointestinal:  Negative for abdominal pain, constipation, diarrhea, nausea and vomiting.   Endocrine: Negative for polydipsia and polyuria.   Genitourinary:  Negative for difficulty urinating, dysuria, hematuria, pelvic pain, vaginal bleeding and vaginal discharge.   Musculoskeletal:  Negative for back pain, neck pain and neck stiffness.   Skin:  Negative for pallor and rash.   Neurological:  Positive for headaches. Negative for dizziness, seizures, weakness, light-headedness, numbness and paresthesias.   All other systems reviewed and are negative.      Physical Exam  Physical Exam  Vitals and nursing note reviewed.   Constitutional:       General: She is not in acute distress.     Appearance: She is well-developed.   HENT:      Head: Normocephalic and atraumatic.      Mouth/Throat:      Mouth: Oropharynx is clear and moist and mucous membranes are normal.   Eyes:      Extraocular Movements: EOM normal.      Pupils: Pupils are equal, round, and reactive to light.   Cardiovascular:      Rate and Rhythm: Normal rate and regular rhythm.      Pulses: Normal pulses.      Heart sounds: Normal heart sounds.   Pulmonary:      Effort: Pulmonary effort is normal. No respiratory distress.      Breath sounds: Normal breath sounds.   Abdominal:      General: There is no distension.       Palpations: Abdomen is soft. Abdomen is not rigid.      Tenderness: There is no abdominal tenderness. There is no guarding or rebound.   Musculoskeletal:         General: No tenderness or edema. Normal range of motion.      Cervical back: Normal range of motion and neck supple.   Lymphadenopathy:      Cervical: No cervical adenopathy.   Skin:     General: Skin is warm and dry.      Capillary Refill: Capillary refill takes less than 2 seconds.   Neurological:      Mental Status: She is alert and oriented to person, place, and time.      Cranial Nerves: No cranial nerve deficit.      Sensory: No sensory deficit.   Psychiatric:         Mood and Affect: Mood and affect normal.         Vital Signs  ED Triage Vitals   Temperature Pulse Respirations Blood Pressure SpO2   24 1223 24 1223 24 1223 24 1223 24 1223   98.2 °F (36.8 °C) (!) 113 18 133/82 98 %      Temp Source Heart Rate Source Patient Position - Orthostatic VS BP Location FiO2 (%)   24 1223 24 1223 24 1223 24 1223 --   Oral Monitor Sitting Right arm       Pain Score       24 1630       4           Vitals:    24 1223 24 1225 24 1402 24 1631   BP: 133/82  144/91 137/74   Pulse: (!) 113 98 82 90   Patient Position - Orthostatic VS: Sitting            Visual Acuity      ED Medications  Medications   butalbital-acetaminophen-caffeine (FIORICET,ESGIC) -40 mg per tablet 1 tablet (1 tablet Oral Given 24 1630)       Diagnostic Studies  Results Reviewed       None                   No orders to display              Procedures  Procedures         ED Course  ED Course as of 05/24/24 2101   Fri May 24, 2024   1437 Recent blood pressure was 144/80   1448 Spoke with anesthesiology who will evaluate patient at bedside within the hour.  They are currently finishing up with a  section.   1449 I also spoke with OB who does not recommend repeat workup for preeclampsia.  Patient was  "just seen in the office yesterday and had her Procardia dose adjusted.  Will continue with her home dose.  OB agrees with blood patch.   1608 Anesthesiology has not been in to evaluate patient   1628 Anesthesiology spoke with patient.  They discussed the risk, benefits and alternatives to blood patch.  Patient would like to pursue conservative measures.  I discussed IV fluids and medication versus p.o. fluids and medication.  She does not wish to have an IV and will like to continue p.o. hydration and p.o. Fioricet.                                             Medical Decision Making  Patient presents with headache and photophobia.  She recently had a  with spinal anesthesia.  She was recently treated for possible postdural puncture headache versus migraine.  She had labs and a urine for possible preeclampsia.  She continues to take her Procardia.  She has a similar presentation today.  HPI consistent with post dural puncture headache.  I discussed case with anesthesiology who evaluated patient in the ED.  They discussed the risk, benefits and alternatives to blood patch and patient would like to pursue conservative measures.  I offered IV fluids and IV caffeine, but patient would prefer p.o. interventions as she wants to go up to the NICU and see her baby.  Patient given Fioricet and expresses significant relief of headache.  I did discuss case with OB, who recently saw her and adjusted her Procardia dose.  They do not recommend a repeat workup for preeclampsia.  Upon discharge, patient's blood pressure improved.  She was encouraged to follow-up with OB and return to ED if headache worsens or she develops vision changes, abdominal pain, other concerns.    Portions of the above record have been created with voice recognition software.  Occasional wrong word or \"sound alike\" substitutions may have occurred due to the inherent limitations of voice recognition software.  Read the chart carefully and recognize, " using context, where substitutions may have occurred.      Problems Addressed:  Post-dural puncture headache:     Details: Discussed conservative management versus blood patch.  Anesthesiology evaluated patient.  Decision made to continue with hydration and caffeine.  Patient reports improvement prior to discharge.  She agrees to return to ED if symptoms worsen.    Amount and/or Complexity of Data Reviewed  External Data Reviewed: notes.    Risk  Prescription drug management.             Disposition  Final diagnoses:   Post-dural puncture headache     Time reflects when diagnosis was documented in both MDM as applicable and the Disposition within this note       Time User Action Codes Description Comment    5/24/2024  5:20 PM Bradford Albrecht Add [G97.1] Post-dural puncture headache           ED Disposition       ED Disposition   Discharge    Condition   Stable    Date/Time   Fri May 24, 2024  5:20 PM    Comment   Jules Sykes discharge to home/self care.                   Follow-up Information       Follow up With Specialties Details Why Contact Luis Angel Massey DO Internal Medicine Schedule an appointment as soon as possible for a visit  Return to ED sooner if worsening headache or development of fever, vomiting, other concerns. [address] [facility] [address]  [phone]              Discharge Medication List as of 5/24/2024  5:25 PM        START taking these medications    Details   butalbital-aspirin-caffeine (FIORINAL) -40 mg capsule Take 1 capsule by mouth every 8 (eight) hours as needed for headaches for up to 10 days, Starting Fri 5/24/2024, Until Mon 6/3/2024 at 2359, Normal           CONTINUE these medications which have NOT CHANGED    Details   ibuprofen (MOTRIN) 600 mg tablet Take 1 tablet (600 mg total) by mouth every 6 (six) hours as needed for moderate pain, Starting Sat 5/18/2024, Normal      NIFEdipine (PROCARDIA XL) 30 mg 24 hr tablet Take 3 tablets (90 mg total) by  mouth daily, Starting Sun 5/19/2024, Normal      norethindrone (Ortho Micronor) 0.35 MG tablet Take 1 tablet (0.35 mg total) by mouth daily, Starting Thu 5/23/2024, Until Wed 8/21/2024, Normal      Prenatal 27-1 MG TABS Take 1 tablet by mouth in the morning, Starting Mon 4/29/2024, Normal      sertraline (ZOLOFT) 100 mg tablet Take 1.5 tablets (150 mg total) by mouth daily, Starting Wed 5/1/2024, Until Sat 6/15/2024, No Print           STOP taking these medications       Blood Pressure Monitoring (Blood Pressure Cuff) MISC Comments:   Reason for Stopping:               No discharge procedures on file.    PDMP Review       None            ED Provider  Electronically Signed by             Bradford Albrecht DO  05/24/24 5218

## 2024-05-26 DIAGNOSIS — O14.90 PRE-ECLAMPSIA: ICD-10-CM

## 2024-05-27 LAB
ATRIAL RATE: 104 BPM
ATRIAL RATE: 87 BPM
P AXIS: 51 DEGREES
P AXIS: 52 DEGREES
PR INTERVAL: 126 MS
PR INTERVAL: 128 MS
QRS AXIS: 80 DEGREES
QRS AXIS: 86 DEGREES
QRSD INTERVAL: 70 MS
QRSD INTERVAL: 74 MS
QT INTERVAL: 342 MS
QT INTERVAL: 358 MS
QTC INTERVAL: 430 MS
QTC INTERVAL: 449 MS
T WAVE AXIS: 37 DEGREES
T WAVE AXIS: 48 DEGREES
VENTRICULAR RATE: 104 BPM
VENTRICULAR RATE: 87 BPM

## 2024-05-27 PROCEDURE — 93010 ELECTROCARDIOGRAM REPORT: CPT | Performed by: INTERNAL MEDICINE

## 2024-05-28 RX ORDER — NIFEDIPINE 30 MG/1
90 TABLET, EXTENDED RELEASE ORAL DAILY
Qty: 30 TABLET | Refills: 0 | Status: SHIPPED | OUTPATIENT
Start: 2024-05-28

## 2024-05-29 ENCOUNTER — TELEPHONE (OUTPATIENT)
Dept: OBGYN CLINIC | Facility: CLINIC | Age: 24
End: 2024-05-29

## 2024-05-29 NOTE — TELEPHONE ENCOUNTER
Pt called stating that she called her insurance company and was informed her BP cuff should be sent to [facility] in [place].

## 2024-05-31 ENCOUNTER — LACTATION ENCOUNTER (OUTPATIENT)
Dept: OTHER | Facility: HOSPITAL | Age: 24
End: 2024-05-31

## 2024-05-31 NOTE — LACTATION NOTE
This note was copied from a baby's chart.  Follow UP Lactation: Vision called to request help with pumping. Vision states the right breast does not stay in the flange. Vision has noticed a difference in breast size and amt. Of milk expressed. Vision states the Right breast produces less milk.       Mom:  Breast: extra large, pendulous breasts with large, dark areolas and small, everted nipples. Upon breast palpation, full glands bilaterally noted. R breast has corado glands in outer quadrants  Nipple Assessment in General: Normal: elongated/eraser, no discoloration and no damage noted.  Mother's Awareness of Feeding Cues                 Recognizes: Yes                  Verbalizes: Yes  Support System: FOB  History of Breastfeeding: first baby.    Measured the R nipple at 21 mm flange (with 2-3 mm for growth). Ed. On lifting the breasts and providing a shelf for large, full breasts.    Ed. On 3rd party distributors that offer smaller flanges on-line. ie) [employer]. Names of reputable companies like [employer] and [employer] offer smaller flanges for every double electric pump. Lactation Hub will also provide a set of 12 mm to 19 mm flanges to fit most flanges.Enc. To try smaller flange and place a small amount of lanolin where the tunnel and funnel meet.     Milk Supply:   - Allow for non-nutritive suck at the breast to stimulate supply   - Allow for skin to skin during and after each breastfeeding session   - Use massage, heat, and hand expression prior to feedings to assist with deep latch   - Increase pumping sessions and pump after every feeding    Demonstration and teach back of hands on pumping and cycling techniques. Demonstration and teach back of warmth, massage, and timing of pumping. Ed. On pumping for NICU baby.    Discharge feeding plan for NICU Pumping     Set alarms to pump every 2 hrs during the day and every 3 hrs at night  Use massage, warmth, & hand expression to stimulate glandular tissue prior to  pumping.  May use nipple cream/butter/oil where tunnel and funnel meet on flange to assist movement of breast tissue inside the flange  Use breast compressions, hands on pumping techniques to assist in expressing milk    Cycle pumping - Begin the pump in stimulation mode. Once milk is visible in the tunnel of the flange, change pump setting to expression mode. Once milk is NOT seen in the tunnel, cycle back to stimulation mode.Continue cycle pumping until end of feeding.    Pump both breasts simultaneously  Use all 5 senses when pumping to increase milk transfer.  Store expressed milk or feed expressed milk via syringe, NG tube or paced bottle feeding method.   Continue to hand express between feeds to stimulate the breasts frequently    Review [employer] on youtube or scan QR code for [employer]ob video  When with baby, place baby skin to skin. Attempt to latch when medically cleared.         Milk Mob       Mom pumped for 20 min and expressed over 1 oz .    Ed. On how to maintain and increase milk supply    Milk Supply:   - Allow for non-nutritive suck at the breast to stimulate supply   - Allow for skin to skin during and after each breastfeeding session   - Use massage, heat, and hand expression prior to feedings to assist with deep latch   - Increase pumping sessions and pump after every feeding      Enc.to call lactation for s2s, NNS, and latching for feeding.

## 2024-06-03 ENCOUNTER — LACTATION ENCOUNTER (OUTPATIENT)
Dept: OTHER | Facility: HOSPITAL | Age: 24
End: 2024-06-03

## 2024-06-03 NOTE — LACTATION NOTE
"This note was copied from a baby's chart.  Follow up lactation note:  Called to NICU for Vision's request for a \"Dry feed\". Vision pumping with electric breast pump. Taught how to cycle through to stimulate additional let downs and how to use hands on pumping and provide gentle massage behind palpable glands.     Katelynn brought to right breast in football hold. Latch obtained with no sucks present but a few visualized swallows. Katelynn remained on breast for 10 minutes and unlatched self. Katelynn no longer demonstrating wide gape.     Reviewed alignment with Vision of nipple to nose and hold of breast.     Encouraged to call with additional questions.   "

## 2024-06-04 ENCOUNTER — LACTATION ENCOUNTER (OUTPATIENT)
Dept: OTHER | Facility: HOSPITAL | Age: 24
End: 2024-06-04

## 2024-06-04 NOTE — LACTATION NOTE
This note was copied from a baby's chart.  Follow up lactation note:    LATCH Documentation   Latch 2   Audible Swallowing 2   Type of Nipple 2   Comfort (Breast/Nipple) 2   Hold (Positioning) 1   LATCH Score 9   Having latch problems? No   Position(s) Used Football   Breasts/Nipples   Left Breast Soft   Right Breast Soft   Left Nipple Everted   Right Nipple Everted   Intervention Breast pump   Breastfeeding Status Yes   Breastfeeding Progress Breastfeeding well;Not yet established   Reasons for not Breastfeeding Infant medical condition   Other OB Lactation Tools   Feeding Devices Pump   Breast Pump   Pump 3;2;1   Patient Follow-Up   Lactation Consult Status 2   Follow-Up Type Inpatient;Call as needed   Other OB Lactation Documentation    Additional Problem Noted Katelynn had 4 bursts of 4 sucks and 1 swallow, then remained on the breast with intermittent swallows for 10 minutes, removed and placed s2s and began cueing again.     Called to NICU for support with breastfeeding. Vision reports pumping at 1330. Katelynn in S2S with vision and brought into football hold on left breast. Katelynn eagerly obtained deep latch and performed 4 bursts of 4 sucks and a swallow. Katelynn remained latched onto the breast for 10 minutes and performed intermittent swallows with breast compressions. Katelynn unlatched self and was brought S2S. Katelynn is still showing hunger cues so she was again brought into football hold on left breast. She obtained latch and performed a few sucks and swallows and remained latched.  Vision reports latch is comfortable and not painful. Denies questions at this time. Encouraged to call with additional questions or latch support.

## 2024-06-07 ENCOUNTER — LACTATION ENCOUNTER (OUTPATIENT)
Dept: OTHER | Facility: HOSPITAL | Age: 24
End: 2024-06-07

## 2024-06-07 DIAGNOSIS — F41.1 GAD (GENERALIZED ANXIETY DISORDER): ICD-10-CM

## 2024-06-07 RX ORDER — SERTRALINE HYDROCHLORIDE 100 MG/1
200 TABLET, FILM COATED ORAL DAILY
Qty: 90 TABLET | Refills: 0 | Status: SHIPPED | OUTPATIENT
Start: 2024-06-07

## 2024-06-07 NOTE — LACTATION NOTE
"This note was copied from a baby's chart.  Called to NICU for a latch check.  Vision positions baby well and is able to achieve a latch with a few sucks and swallows, breast compressions offered as needed.  Baby is sleepy at this attempt.    Vision states that the last few days she has been pumping less as it is \"hard work.\"   Encouraged 8-12 milk removals in 24 hours, ideally q2-3hrs during the day and at least 1-2 times over night not going longer than 5hrs between nighttime milk removals.    Encouraged Vision to call for assistance as needed.  "

## 2024-06-11 DIAGNOSIS — O14.90 PRE-ECLAMPSIA: ICD-10-CM

## 2024-06-11 RX ORDER — NIFEDIPINE 30 MG/1
90 TABLET, EXTENDED RELEASE ORAL DAILY
Qty: 30 TABLET | Refills: 0 | Status: SHIPPED | OUTPATIENT
Start: 2024-06-11

## 2024-06-11 NOTE — TELEPHONE ENCOUNTER
Pt called in requesting a refill of her Procardia, pt states that she has not taken the medication since Saturday

## 2024-06-13 ENCOUNTER — POSTPARTUM VISIT (OUTPATIENT)
Dept: OBGYN CLINIC | Facility: CLINIC | Age: 24
End: 2024-06-13

## 2024-06-13 ENCOUNTER — PATIENT OUTREACH (OUTPATIENT)
Dept: OBGYN CLINIC | Facility: CLINIC | Age: 24
End: 2024-06-13

## 2024-06-13 VITALS
HEART RATE: 103 BPM | WEIGHT: 206.2 LBS | BODY MASS INDEX: 38.93 KG/M2 | HEIGHT: 61 IN | DIASTOLIC BLOOD PRESSURE: 80 MMHG | SYSTOLIC BLOOD PRESSURE: 116 MMHG

## 2024-06-13 DIAGNOSIS — Z98.891 STATUS POST CESAREAN SECTION: ICD-10-CM

## 2024-06-13 NOTE — PROGRESS NOTES
"CHRIST CAVANAUGH met with Pt for post partum follow up. Pt came accompanied  by FOB and presents in good mood. Pt denies any post partum signs at present time. Pt reported baby \"Tracy\" is doing well and she is hoping to take her home in the next few weeks. Pt has needed items for unborn baby. Pt has decided on Nexplanon for contraception. Pt was encouraged to contact CHRIST CAVANAUGH as needed.  "

## 2024-06-13 NOTE — PROGRESS NOTES
"POSTPARTUM VISIT    Vision Greg Sykes presents today for postpartum visit. She is a  who was initially admitted at 31w0d for a new diagnosis of preeclampsia with severe features.  Her severe features was sustained severe-range BP requiring acute treatment with Procardia IR 10 mg PO, Labetalol 20/40/80 mg IV, and Hydralazine 5 mg IV.  CBC and CMP were normal on admission, but UP:C was elevated at 9.33.  She received Betamethasone for fetal lung maturation.  She received magnesium for maternal seizure prophylaxis and for fetal neuroprotection. She then underwent 1CCS on 24 with apgars of 3 and 8. She delivered a baby girl with a birth weight of 1214g who was taken to NICU. Her postpartum course was complicated by multiple titrations of antihypertensive medication and an elevated EPDS of 15. She is breast feeding and pumping for her infant and reports no issues with such. Baby is doing well in NICU.   She is currently using POPs for contraception and would like to receive a Nexplanon.  She was provided with [place] Depression Screening tool and her score was 13.    Review of Systems:   -Constitutional: denies issues, denies pain   -Breasts: denies tenderness   -Gynecologic: lochia scant spotting    -Urinary: denies issues urinating   -GI: stools WNL, denies issues    Physical Exam:   -Vitals:   Vitals:    24 0932   BP: 116/80   BP Location: Right arm   Patient Position: Sitting   Pulse: 103   Weight: 93.5 kg (206 lb 3.2 oz)   Height: 5' 1\" (1.549 m)      -General: A&Ox3, no acute distress noted   -Abdomen: soft, non-tender, incision appears clean/dry/intact and well-healed   -Extremities: nontender, no edema noted   -Breasts:    Deferred    -Pelvic exam:    Deferred     Assessment/Plan:  1. Normal postpartum exam.  2. Depression screening positive. Reports she is doing well with her Zoloft and is feeling much better from last appointment. She has a good support system. Denies SI/HI.   3. Last " pap smear was done 9/2023 and result was NILM.    4. Contraception: Currently using POPs. Order forms completed for Nexplanon, will return for insertion when devise is delivered.   5. Encouraged to schedule with PCP for continued management of BP, currently well controlled on Procardia.

## 2024-06-17 ENCOUNTER — LACTATION ENCOUNTER (OUTPATIENT)
Dept: OTHER | Facility: HOSPITAL | Age: 24
End: 2024-06-17

## 2024-06-17 NOTE — LACTATION NOTE
This note was copied from a baby's chart.  Called to bedside to assess latch.  Vision has Katelynn positioned well, and she report she has been sucking and swallowing, minimal sucks noted.  Breast compressions offered throughput feeding.    Mild intracostal retractions and increase respiration rate noted for a few seconds with spontaneous resolution, Janis Cruz RN aware.     Vision states she is not pumping enough, she states that she occasionally skips a session.  Encouraged 8-12 milk removals in 24 hours, ideally q2-3hrs during the day and at least 1-2 times over night not going longer than 5hrs between nighttime milk removals.    Vision states that she had pitting edema of the breasts due to preeclampsia, very mild dimpling visible on B/L breasts, Vision states it has been significantly improving.    Encouraged Vision to call for assistance as needed.

## 2024-06-20 NOTE — PROGRESS NOTES
Reviewed pt chart, specifically [facility] visit notes, to provide clinical feedback to attending psychiatrist, in preparation for today's visit  TT sent to pt PCP, requesting an order be placed for ambulatory referral to psychiatry for pt 
Detail Level: Detailed

## 2024-06-24 ENCOUNTER — TELEPHONE (OUTPATIENT)
Dept: OBGYN CLINIC | Facility: CLINIC | Age: 24
End: 2024-06-24

## 2024-06-24 NOTE — TELEPHONE ENCOUNTER
Lvm for patient to advise Nexplanon was received in office. Given office call back number to contact to schedule appointment.

## 2024-07-01 ENCOUNTER — PROCEDURE VISIT (OUTPATIENT)
Dept: OBGYN CLINIC | Facility: CLINIC | Age: 24
End: 2024-07-01

## 2024-07-01 VITALS
WEIGHT: 208.2 LBS | DIASTOLIC BLOOD PRESSURE: 79 MMHG | SYSTOLIC BLOOD PRESSURE: 118 MMHG | BODY MASS INDEX: 39.31 KG/M2 | HEART RATE: 76 BPM | HEIGHT: 61 IN

## 2024-07-01 DIAGNOSIS — Z30.017 NEXPLANON INSERTION: Primary | ICD-10-CM

## 2024-07-01 LAB — SL AMB POCT URINE HCG: NEGATIVE

## 2024-07-01 PROCEDURE — 11981 INSERTION DRUG DLVR IMPLANT: CPT | Performed by: NURSE PRACTITIONER

## 2024-07-01 PROCEDURE — 81025 URINE PREGNANCY TEST: CPT | Performed by: NURSE PRACTITIONER

## 2024-07-01 NOTE — PROGRESS NOTES
"Universal Protocol:  Consent: Verbal consent obtained. Written consent obtained.  Risks and benefits: risks, benefits and alternatives were discussed  Consent given by: patient  Time out: Immediately prior to procedure a \"time out\" was called to verify the correct patient, procedure, equipment, support staff and site/side marked as required.  Patient understanding: patient states understanding of the procedure being performed  Patient consent: the patient's understanding of the procedure matches consent given  Procedure consent: procedure consent matches procedure scheduled  Relevant documents: relevant documents present and verified  Test results: test results available and properly labeled  Site marked: the operative site was marked  Patient identity confirmed: verbally with patient  Remove and insert drug implant    Date/Time: 7/1/2024 8:30 AM    Performed by: JAMIE Jacob  Authorized by: JAMIE Jacob    Indication:     Indication: Insertion of non-biodegradable drug delivery implant    Pre-procedure:     Pre-procedure timeout performed: yes      Prepped with: povidone-iodine      Local anesthetic:  Xylocaine with epinephrine  Procedure:     Procedure:  Insertion    Small stab incision was made in arm: yes      Left/right:  Left    Preloaded contraceptive capsule trocar was placed subdermally: yes      Visualization of implant was obtained: yes      Contraceptive capsule was inserted and trocar removed: yes      Visualization of notch in stylet and palpation of device: yes      Palpation confirms placement by provider and patient: yes      Site was closed with steri-strips and pressure bandage applied: yes        "

## 2024-08-21 NOTE — QUICK NOTE
"Patient returned from the NICU, per nursing report feeling well, no symptoms of preeclampsia at this time.  Blood pressure mild range, will start Procardia 30 mg XL daily.  Lab values within normal limits, creatinine improving to 0.90.  Will discontinue magnesium sulfate infusion at this time.    /97 Comment: nurse notified  Pulse 66   Temp 98.1 °F (36.7 °C) (Oral)   Resp 20   Ht 5' 1\" (1.549 m)   Wt 100 kg (221 lb)   LMP 10/08/2023 (Approximate)   SpO2 98%   Breastfeeding Yes   BMI 41.76 kg/m²     Hamida Majano MD  PGY 1, Obstetrics and Gynecology  5/14/2024  8:39 AM    " 2024       RE: Kenneth Esteves   [address]  [facility] [id number]     Dear Colleague,    Thank you for referring your patient, Kenneth Esteves, to the [facility] [place] [facility]. Please see a copy of my visit note below.    Department of Neurology  Movement Disorders Division   Follow-up Note    Patient: Kenneth Esteves   MRN: [id number]   : 1980   Date of Visit: 2024    INTERVAL EVENTS:  Kenneth Esteves is a 44 year old female who returns to clinic for follow up of ET.  She was last seen 2023 at which time gabapentin was weaned due to imbalance.  Tremor is stable.  Imbalance and weakness are worse.  She now must use a walker.    If she misses her nighttime dose she is very shaky.  Forgot her midday dose today and can feel more tremor.  Her balance is worse at night when she takes a higher dose.    Dr. Mcintyre feels that her EMG is not consistent with Charcot Gayathri Tooth.    Her  is concerned about Parkinson's disease.      Related Medications 6am 1pm 8pm   Gabapentin 300mg 1 1 2   Last taken this morning      Current Outpatient Medications   Medication Sig Dispense Refill     Carboxymethylcellulose Sodium (REFRESH CELLUVISC OP)        cetirizine (ZYRTEC) 10 MG tablet Take 1 tablet by mouth daily       citalopram (CELEXA) 40 MG tablet Take 1 tablet by mouth daily       ferrous gluconate (FERGON) 324 (38 Fe) MG tablet Take 324 mg by mouth 2 times daily       fluticasone (FLONASE) 50 MCG/ACT nasal spray Spray 2 sprays in nostril       gabapentin (NEURONTIN) 300 MG capsule Take 1 pill in the morning, 1 pill at midday, and 2 pills in the evening. 120 capsule 11     meclizine (ANTIVERT) 25 MG tablet Take 25 mg by mouth       ondansetron (ZOFRAN ODT) 4 MG ODT tab Place 8 mg under the tongue       SUMAtriptan (IMITREX) 25 MG tablet Take 25 mg by mouth       UNABLE TO FIND Omega 3 algae oil       vitamin  D (ERGOCALCIFEROL) 61096 UNIT capsule Take 50,000 Units by mouth once a week         Allergies   Allergen Reactions     Mold Cough     Other reaction(s): Runny Nose     Adhesive Tape Rash          PHYSICAL EXAM:  /70   Pulse 80   Resp 16   SpO2 97%   Bilateral deltoid, finger extensors, wrist extension, and wrist flexion 4/5  bilateral hand , biceps, finger flexors, and triceps 5/5  Bilateral hip flexors and knee flexion at least 4/5,   Left knee extension 3/5  Right dorsiflexion 4/5, left 3/5  Right plantar flexion 4/5, left 3/5     8/21/2024  2:00 PM   UPDRS Motor Scale    Time: 14:08    Medication Off    R Brain DBS: None    L Brain DBS: None    Dyskinesia (LID) No    Speech 2    Facial Expression 2    Rigidity Neck 2    Rigidity RUE 2    Rigidity LUE 2    Rigidity RLE 1    Rigidity LLE 0    Finger Taps R 2    Finger Taps L 2    Hand Mvt R 2    Hand Mvt L 2    Pron-/Supinate R 2    Pron-/Supinate L 2    Toe Tap R 2    Toe Tap L 4    Leg Agility R 0    Leg Agility L 2    Postural Tremor RUE 3    Postural Tremor LUE 3    Kinetic Tremor RUE 3    Kinetic Tremor LUE 3    Rest Tremor RUE 3    Rest Tremor LUE 2    Rest Tremor RLE 1    Rest Tremor LLE 1    Rest Tremor Lip/Jaw 0    Rest Tremor Constancy 4    Total Right 21    Total Left 23          LABS:  /4.9/101/28/22/0.56  AST 27, ALT 16, Alk phos 76, Alb 4.6, Tpro 7.4, Tbili 0.2  Hemoglobin A1C 5.1  CBC 3.1/13.0/158  Ferritin 41.5  TSH 2.22  FT4 1.01  Vitamin D 74.4    EMG 1/23/2024 -  Severely limited study due to constant motor artifact from tremor, which precluded reliable analysis and averaging of SNAP waveforms, F-waves, and analysis of voluntary muscle activity on EMG.  Motor nerve conduction studies overall are normal except for mildly reduced amplitude of the left fibular (EDB) motor response, which is not diagnostic.  A mild polyneuropathy cannot be excluded due to the above limitations.  If the test is deemed necessary, then it should be  repeated under sedation.        ASSESSMENT/PLAN:  Kenneth Esteves is a 44 year old female who returns to clinic for follow up of tremor.  Her genetics physician raised the question of parkinsonism.  On exam she does have rigidity.  I think that the severity of her tremor accounts for her difficulty with finger tapping and with her tremor at rest rather than parkinsonism.  However since CD/LD is low risk, it is reasonable to give a short trial.    She is having imbalance from gabapentin but declined dose adjustment as she does have some tremor benefit from it.  She tried primidone and propranolol but discontinued due to side effects including dizziness and unsteadiness. Also tried topiramate but discontinued due to cognitive side effects.    The longitudinal plan of care for the diagnosis(es)/condition(s) as documented were addressed during this visit. Due to the added complexity in care, I will continue to support Kenneth in the subsequent management and with ongoing continuity of care.    - Continue gabapentin  - CD/LD trial  - RTC at [facility]      Carlie Larsen MD   of Neurology  Movement Disorders Division      40 minutes spent on the date of the encounter doing chart review, history and exam, documentation and further activities as noted above.      Again, thank you for allowing me to participate in the care of your patient.      Sincerely,    Carlie Larsen MD

## 2024-08-28 ENCOUNTER — SOCIAL WORK (OUTPATIENT)
Age: 24
End: 2024-08-28

## 2024-08-28 DIAGNOSIS — F33.1 MDD (MAJOR DEPRESSIVE DISORDER), RECURRENT EPISODE, MODERATE (HCC): Primary | ICD-10-CM

## 2024-08-28 PROCEDURE — MBD PR MOVING BEYOND DEPRESSION: Performed by: SOCIAL WORKER
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
## 2024-09-06 DIAGNOSIS — F41.1 GAD (GENERALIZED ANXIETY DISORDER): ICD-10-CM

## 2024-09-09 RX ORDER — SERTRALINE HYDROCHLORIDE 100 MG/1
200 TABLET, FILM COATED ORAL DAILY
Qty: 90 TABLET | Refills: 0 | Status: SHIPPED | OUTPATIENT
Start: 2024-09-09

## 2024-09-18 ENCOUNTER — SOCIAL WORK (OUTPATIENT)
Age: 24
End: 2024-09-18

## 2024-09-18 DIAGNOSIS — F33.1 MDD (MAJOR DEPRESSIVE DISORDER), RECURRENT EPISODE, MODERATE (HCC): Primary | ICD-10-CM

## 2024-09-18 DIAGNOSIS — F41.1 GAD (GENERALIZED ANXIETY DISORDER): ICD-10-CM

## 2024-09-18 PROCEDURE — MBD PR MOVING BEYOND DEPRESSION: Performed by: SOCIAL WORKER

## 2024-09-19 NOTE — PSYCH
Behavioral Health Psychotherapy Progress Note    Psychotherapy Provided: Individual Psychotherapy     1. MDD (major depressive disorder), recurrent episode, moderate (HCC)        2. MAIN (generalized anxiety disorder)            Goals addressed in session: Goal 1     DATA: This clinician met with Jules Sykes for IH-CBT.  Session #1.  Completed the EPDS and she scored 14.    Homework Review:None completed    [place]ard items: Feeling depressed and unmotivated.    Session Content: Jules reports that she has been feeing unmotivated and she has low energy.  She does not get out of the house much during the week.  She is able to go out with her boyfriend of the weekend when he is home from [employer] school.  She also goes to work every weekend ([employer]; night shift).  She reports having a lot of anxiety about leaving her baby when she goes to work.  She is sleeping a lot.  Not completing ADLs during the week days when she is home alone with the baby.  She does not have the motivation to make food and therefore she ends up ordering food or not eating.  During the session we came up with some ideas of where to start in making a routine for her day and also finding some things that she could do at home that she would enjoy.    Homework Assigned:Activity Schedule    Feedback from Mother:She is worse of now that she was a couple of weeks ago.    Plan: F/U 9/4/@ 1500  During this session, this clinician used the following therapeutic modalities: Cognitive Behavioral Therapy    Substance Abuse was not addressed during this session. If the client is diagnosed with a co-occurring substance use disorder, please indicate any changes in the frequency or amount of use: N/A. Stage of change for addressing substance use diagnoses: No substance use/Not applicable    ASSESSMENT:  Jules Sykes presents with a Depressed mood.     her affect is Normal range and intensity and Blunted, which is congruent, with her mood and the content  "of the session. The client has not made progress on their goals.    Currently, Jules Sykes presents with a none risk of suicide, none risk of self-harm, and none risk of harm to others.    For any risk assessment that surpasses a \"low\" rating, a safety plan must be developed.    A safety plan was indicated: no  If yes, describe in detail Will complete at next visit    PLAN: Between sessions, Jules Sykes will work on activity schedule. At the next session, the therapist will use Cognitive Behavioral Therapy to address symptoms of depression and anxiety.    Behavioral Health Treatment Plan and Discharge Planning: Jules Sykes is aware of and agrees to continue to work on their treatment plan. They have identified and are working toward their discharge goals. yes    Visit start and stop times:    09/18/24  Start Time: 1400  Stop Time: 1500  Total Visit Time: 60 minutes  "

## 2024-10-17 ENCOUNTER — TELEPHONE (OUTPATIENT)
Age: 24
End: 2024-10-17

## 2024-10-17 NOTE — TELEPHONE ENCOUNTER
Patient contacted the office to schedule a follow up visit with provider. Patient is now scheduled for 10/18/24  at 11:30am virtually.

## 2024-10-18 ENCOUNTER — TELEMEDICINE (OUTPATIENT)
Dept: PSYCHIATRY | Facility: CLINIC | Age: 24
End: 2024-10-18
Payer: COMMERCIAL

## 2024-10-18 DIAGNOSIS — F41.1 GAD (GENERALIZED ANXIETY DISORDER): ICD-10-CM

## 2024-10-18 DIAGNOSIS — F33.1 MDD (MAJOR DEPRESSIVE DISORDER), RECURRENT EPISODE, MODERATE (HCC): ICD-10-CM

## 2024-10-18 PROCEDURE — 99214 OFFICE O/P EST MOD 30 MIN: CPT | Performed by: STUDENT IN AN ORGANIZED HEALTH CARE EDUCATION/TRAINING PROGRAM

## 2024-10-18 RX ORDER — BUPROPION HYDROCHLORIDE 150 MG/1
150 TABLET ORAL EVERY MORNING
Qty: 90 TABLET | Refills: 0 | Status: SHIPPED | OUTPATIENT
Start: 2024-10-18 | End: 2025-01-16

## 2024-10-18 RX ORDER — BUPROPION HYDROCHLORIDE 150 MG/1
150 TABLET ORAL EVERY MORNING
COMMUNITY
Start: 2024-07-26 | End: 2024-10-18 | Stop reason: SDUPTHER

## 2024-10-18 RX ORDER — SERTRALINE HYDROCHLORIDE 100 MG/1
150 TABLET, FILM COATED ORAL DAILY
Qty: 135 TABLET | Refills: 0 | Status: SHIPPED | OUTPATIENT
Start: 2024-10-18 | End: 2025-01-16

## 2024-10-18 NOTE — TELEPHONE ENCOUNTER
Patient called and stated provider called her but when she went to answer her phone it hung up accidentally. Patient is requesting provider call her back.   Writer verified phone number on file.
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
## 2025-01-09 ENCOUNTER — APPOINTMENT (OUTPATIENT)
Dept: LAB | Facility: MEDICAL CENTER | Age: 25
End: 2025-01-09

## 2025-01-09 ENCOUNTER — APPOINTMENT (OUTPATIENT)
Dept: URGENT CARE | Facility: MEDICAL CENTER | Age: 25
End: 2025-01-09

## 2025-01-09 DIAGNOSIS — Z02.1 ENCOUNTER FOR PRE-EMPLOYMENT HEALTH SCREENING EXAMINATION: Primary | ICD-10-CM

## 2025-01-09 DIAGNOSIS — Z02.1 ENCOUNTER FOR PRE-EMPLOYMENT HEALTH SCREENING EXAMINATION: ICD-10-CM

## 2025-01-09 LAB — RUBV IGG SERPL IA-ACNC: 14.1 IU/ML

## 2025-01-09 PROCEDURE — 86787 VARICELLA-ZOSTER ANTIBODY: CPT

## 2025-01-09 PROCEDURE — 86735 MUMPS ANTIBODY: CPT

## 2025-01-09 PROCEDURE — 86706 HEP B SURFACE ANTIBODY: CPT

## 2025-01-09 PROCEDURE — 86480 TB TEST CELL IMMUN MEASURE: CPT

## 2025-01-09 PROCEDURE — 36415 COLL VENOUS BLD VENIPUNCTURE: CPT

## 2025-01-09 PROCEDURE — 86765 RUBEOLA ANTIBODY: CPT

## 2025-01-09 PROCEDURE — 86762 RUBELLA ANTIBODY: CPT

## 2025-01-10 LAB
GAMMA INTERFERON BACKGROUND BLD IA-ACNC: 0.04 IU/ML
HBV SURFACE AB SER-ACNC: <3 MIU/ML
M TB IFN-G BLD-IMP: NEGATIVE
M TB IFN-G CD4+ BCKGRND COR BLD-ACNC: -0.02 IU/ML
M TB IFN-G CD4+ BCKGRND COR BLD-ACNC: 0 IU/ML
MEV IGG SER QL IA: NORMAL
MITOGEN IGNF BCKGRD COR BLD-ACNC: 9 IU/ML
MUV IGG SER QL IA: ABNORMAL
VZV IGG SER QL IA: NORMAL

## 2025-01-23 ENCOUNTER — APPOINTMENT (OUTPATIENT)
Dept: URGENT CARE | Facility: MEDICAL CENTER | Age: 25
End: 2025-01-23

## 2025-02-22 DIAGNOSIS — F41.1 GAD (GENERALIZED ANXIETY DISORDER): ICD-10-CM

## 2025-02-22 DIAGNOSIS — F33.1 MDD (MAJOR DEPRESSIVE DISORDER), RECURRENT EPISODE, MODERATE (HCC): ICD-10-CM

## 2025-02-24 RX ORDER — BUPROPION HYDROCHLORIDE 150 MG/1
150 TABLET ORAL EVERY MORNING
Qty: 90 TABLET | Refills: 0 | Status: SHIPPED | OUTPATIENT
Start: 2025-02-24

## 2025-05-14 ENCOUNTER — TELEPHONE (OUTPATIENT)
Age: 25
End: 2025-05-14

## 2025-05-15 ENCOUNTER — TELEMEDICINE (OUTPATIENT)
Dept: PSYCHIATRY | Facility: CLINIC | Age: 25
End: 2025-05-15
Payer: COMMERCIAL

## 2025-05-15 ENCOUNTER — TELEPHONE (OUTPATIENT)
Dept: PSYCHIATRY | Facility: CLINIC | Age: 25
End: 2025-05-15

## 2025-05-15 DIAGNOSIS — F33.1 MDD (MAJOR DEPRESSIVE DISORDER), RECURRENT EPISODE, MODERATE (HCC): ICD-10-CM

## 2025-05-15 DIAGNOSIS — F41.1 GAD (GENERALIZED ANXIETY DISORDER): Primary | ICD-10-CM

## 2025-05-15 PROCEDURE — 99214 OFFICE O/P EST MOD 30 MIN: CPT | Performed by: STUDENT IN AN ORGANIZED HEALTH CARE EDUCATION/TRAINING PROGRAM

## 2025-05-15 RX ORDER — BUPROPION HYDROCHLORIDE 300 MG/1
300 TABLET ORAL DAILY
Qty: 90 TABLET | Refills: 1 | Status: SHIPPED | OUTPATIENT
Start: 2025-05-15

## 2025-05-15 RX ORDER — FLUOXETINE HYDROCHLORIDE 90 MG/1
90 CAPSULE, DELAYED RELEASE PELLETS ORAL
Qty: 4 CAPSULE | Refills: 1 | Status: SHIPPED | OUTPATIENT
Start: 2025-05-15
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
## 2025-05-15 NOTE — TELEPHONE ENCOUNTER
PA for FLUoxetine HCl 90 mg dr capsules SUBMITTED to [employer]    via    [x]CMM-KEY: [id number]  []Surescripts-Case ID #   []Availity-Auth ID # NDC #   []Faxed to plan   []Other website   []Phone call Case ID #     []PA sent as URGENT    All office notes, labs and other pertaining documents and studies sent. Clinical questions answered. Awaiting determination from insurance company.     Turnaround time for your insurance to make a decision on your Prior Authorization can take 7-21 business days.

## 2025-05-19 NOTE — TELEPHONE ENCOUNTER
PA for Fluoxetine 90mg  APPROVED     Date(s) approved 5/16/25-5/16/26    Case #    Patient advised by          [x]MyChart Message  []Phone call   []LMOM  []L/M to call office as no active Communication consent on file  []Unable to leave detailed message as VM not approved on Communication consent       Pharmacy advised by    [x]Fax  []Phone call  []Secure Chat    Specialty Pharmacy    []     Approval letter scanned into Media Yes

## 2025-05-20 ENCOUNTER — TELEPHONE (OUTPATIENT)
Dept: PSYCHIATRY | Facility: CLINIC | Age: 25
End: 2025-05-20

## 2025-05-27 ENCOUNTER — TELEPHONE (OUTPATIENT)
Dept: PSYCHIATRY | Facility: CLINIC | Age: 25
End: 2025-05-27

## 2025-05-27 NOTE — TELEPHONE ENCOUNTER
Left voicemail informing patient and/or parent/guardian of the Psych Encounter form needing to be signed as a requirement from the insurance company for billing purposes. Patient can access form via [employer] and sign electronically.     Please make patient aware this form must be signed for each visit as a requirement to continue future visits with provider.

## 2025-06-09 ENCOUNTER — TELEPHONE (OUTPATIENT)
Dept: PSYCHIATRY | Facility: CLINIC | Age: 25
End: 2025-06-09

## 2025-06-09 NOTE — TELEPHONE ENCOUNTER
Left voicemail informing patient and/or parent/guardian of the Psych Encounter form needing to be signed as a requirement from the insurance company for billing purposes. Patient can access form via [employer] and sign electronically.     Please make patient aware this form must be signed for each visit as a requirement to continue future visits with provider.

## 2025-06-23 ENCOUNTER — TELEPHONE (OUTPATIENT)
Age: 25
End: 2025-06-23

## 2025-06-23 NOTE — TELEPHONE ENCOUNTER
Vision Greg Sykes and/or Parent/Guardian contacted the office with a medication concern.       Name of Medication FLUoxetine        Concerns/ Side effects VARY ANXIOUS       Please Review. Thank you.     Pt also has been scheduled to see provider tomorrow at 530 VV

## 2025-06-24 ENCOUNTER — TELEMEDICINE (OUTPATIENT)
Dept: PSYCHIATRY | Facility: CLINIC | Age: 25
End: 2025-06-24

## 2025-06-24 DIAGNOSIS — Z91.199 NO-SHOW FOR APPOINTMENT: Primary | ICD-10-CM

## 2025-06-24 PROCEDURE — NOSHOW: Performed by: STUDENT IN AN ORGANIZED HEALTH CARE EDUCATION/TRAINING PROGRAM

## 2025-06-26 NOTE — PSYCH
No Call. No Show. No Charge    Jules Sykes no showed 06/26/25 appointment , staff called and left message to reschedule appointment     Treatment Plan not completed within required time limits due to: Jules Sykes no show appointment on 06/26/25

## 2025-06-27 ENCOUNTER — TELEPHONE (OUTPATIENT)
Dept: PSYCHIATRY | Facility: CLINIC | Age: 25
End: 2025-06-27

## 2025-06-27 NOTE — TELEPHONE ENCOUNTER
Left voicemail informing patient and/or parent/guardian of the Psych Encounter form needing to be signed as a requirement from the insurance company for billing purposes. Patient can access form via [employer] and sign electronically.     Please make patient aware this form must be signed for each visit as a requirement to continue future visits with provider..    Virtual Encounter form 6/24/25 call #!

## 2025-07-08 ENCOUNTER — TELEPHONE (OUTPATIENT)
Dept: PSYCHIATRY | Facility: CLINIC | Age: 25
End: 2025-07-08

## 2025-07-08 NOTE — TELEPHONE ENCOUNTER
Left voicemail informing patient and/or parent/guardian of the Psych Encounter form needing to be signed as a requirement from the insurance company for billing purposes. Patient can access form via [employer] and sign electronically.     Please make patient aware this form must be signed for each visit as a requirement to continue future visits with provider.    Virtual Encounter form 6/24/25 call #2

## 2025-07-15 DIAGNOSIS — F33.1 MDD (MAJOR DEPRESSIVE DISORDER), RECURRENT EPISODE, MODERATE (HCC): ICD-10-CM

## 2025-07-15 DIAGNOSIS — F41.1 GAD (GENERALIZED ANXIETY DISORDER): ICD-10-CM

## 2025-07-15 RX ORDER — FLUOXETINE HYDROCHLORIDE 90 MG/1
90 CAPSULE, DELAYED RELEASE PELLETS ORAL
Qty: 4 CAPSULE | Refills: 1 | Status: SHIPPED | OUTPATIENT
Start: 2025-07-15

## (undated) DEVICE — SUT MONOCRYL 0 CTX 36 IN Y398H

## (undated) DEVICE — SUT MONOCRYL 3-0 SH 27 IN Y316H

## (undated) DEVICE — TELFA NON-ADHERENT ABSORBENT DRESSING: Brand: TELFA

## (undated) DEVICE — GAUZE SPONGES,16 PLY: Brand: CURITY

## (undated) DEVICE — GLOVE SRG BIOGEL ECLIPSE 8

## (undated) DEVICE — ABDOMINAL PAD: Brand: DERMACEA

## (undated) DEVICE — Device

## (undated) DEVICE — MEDI-VAC YANKAUER SUCTION HANDLE W/STRAIGHT TIP & CONTROL VENT: Brand: CARDINAL HEALTH

## (undated) DEVICE — CHLORAPREP HI-LITE 26ML ORANGE

## (undated) DEVICE — SUT VICRYL 2-0 CT-1 27 IN J259H

## (undated) DEVICE — SKIN MARKER DUAL TIP WITH RULER CAP, FLEXIBLE RULER AND LABELS: Brand: DEVON

## (undated) DEVICE — SUT VICRYL 4-0 KS 27 IN J662H

## (undated) DEVICE — SURGICEL NU-KNIT 3 X 4

## (undated) DEVICE — PACK C-SECTION PBDS

## (undated) DEVICE — SUT VICRYL 0 CT-1 27 IN J260H

## (undated) DEVICE — INTERCEED

## (undated) DEVICE — SPONGE LAP 18 X 18 IN STRL RFD